# Patient Record
Sex: MALE | Race: WHITE | Employment: PART TIME | URBAN - METROPOLITAN AREA
[De-identification: names, ages, dates, MRNs, and addresses within clinical notes are randomized per-mention and may not be internally consistent; named-entity substitution may affect disease eponyms.]

---

## 2019-07-22 ENCOUNTER — HOSPITAL ENCOUNTER (EMERGENCY)
Age: 19
Discharge: HOME | End: 2019-07-22
Payer: COMMERCIAL

## 2019-07-22 VITALS
BODY MASS INDEX: 21.52 KG/M2 | OXYGEN SATURATION: 98 % | TEMPERATURE: 98 F | SYSTOLIC BLOOD PRESSURE: 124 MMHG | RESPIRATION RATE: 16 BRPM | DIASTOLIC BLOOD PRESSURE: 76 MMHG | WEIGHT: 150 LBS | HEART RATE: 60 BPM

## 2019-07-22 DIAGNOSIS — L23.7 POISON IVY: Primary | ICD-10-CM

## 2019-07-22 PROCEDURE — 99212 OFFICE O/P EST SF 10 MIN: CPT

## 2019-07-22 RX ORDER — PREDNISONE 10 MG/1
TABLET ORAL
Qty: 20 TABLET | Refills: 0 | Status: SHIPPED | OUTPATIENT
Start: 2019-07-22 | End: 2021-10-14

## 2019-07-22 RX ORDER — PREDNISONE 10 MG/1
TABLET ORAL
Qty: 20 TABLET | Refills: 0 | Status: SHIPPED | OUTPATIENT
Start: 2019-07-22 | End: 2019-07-22 | Stop reason: SDUPTHER

## 2019-07-22 NOTE — ED PROVIDER NOTES
DISPOSITION  Disposition: Discharge to home  Patient condition is good         Francesca Ayala PA-C  07/22/19 1111

## 2021-09-05 ENCOUNTER — HOSPITAL ENCOUNTER (EMERGENCY)
Age: 21
Discharge: HOME | End: 2021-09-05
Payer: COMMERCIAL

## 2021-09-05 VITALS
DIASTOLIC BLOOD PRESSURE: 76 MMHG | TEMPERATURE: 98.3 F | HEART RATE: 67 BPM | SYSTOLIC BLOOD PRESSURE: 122 MMHG | HEIGHT: 67 IN | WEIGHT: 160 LBS | RESPIRATION RATE: 20 BRPM | OXYGEN SATURATION: 97 % | BODY MASS INDEX: 25.11 KG/M2

## 2021-09-05 DIAGNOSIS — R19.7 DIARRHEA, UNSPECIFIED TYPE: Primary | ICD-10-CM

## 2021-09-05 LAB
BASOPHILS ABSOLUTE: 0.04 E9/L (ref 0–0.2)
BASOPHILS RELATIVE PERCENT: 0.9 % (ref 0–2)
EOSINOPHILS ABSOLUTE: 0.19 E9/L (ref 0.05–0.5)
EOSINOPHILS RELATIVE PERCENT: 4.2 % (ref 0–6)
GFR AFRICAN AMERICAN: >60
GFR NON-AFRICAN AMERICAN: >60 ML/MIN/1.73
GLUCOSE BLD-MCNC: 90 MG/DL (ref 74–99)
HCT VFR BLD CALC: 46 % (ref 37–54)
HEMOGLOBIN: 16.5 G/DL (ref 12.5–16.5)
IMMATURE GRANULOCYTES #: 0.01 E9/L
IMMATURE GRANULOCYTES %: 0.2 % (ref 0–5)
LYMPHOCYTES ABSOLUTE: 1.42 E9/L (ref 1.5–4)
LYMPHOCYTES RELATIVE PERCENT: 31.1 % (ref 20–42)
MCH RBC QN AUTO: 30.1 PG (ref 26–35)
MCHC RBC AUTO-ENTMCNC: 35.9 % (ref 32–34.5)
MCV RBC AUTO: 83.8 FL (ref 80–99.9)
MONOCYTES ABSOLUTE: 0.46 E9/L (ref 0.1–0.95)
MONOCYTES RELATIVE PERCENT: 10.1 % (ref 2–12)
NEUTROPHILS ABSOLUTE: 2.44 E9/L (ref 1.8–7.3)
NEUTROPHILS RELATIVE PERCENT: 53.5 % (ref 43–80)
PDW BLD-RTO: 12.7 FL (ref 11.5–15)
PERFORMED ON: NORMAL
PLATELET # BLD: 193 E9/L (ref 130–450)
PMV BLD AUTO: 10.9 FL (ref 7–12)
POC CHLORIDE: 104 MMOL/L (ref 100–108)
POC CREATININE: 1.1 MG/DL (ref 0.7–1.2)
POC POTASSIUM: 4.2 MMOL/L (ref 3.5–5)
POC SODIUM: 142 MMOL/L (ref 132–146)
RBC # BLD: 5.49 E12/L (ref 3.8–5.8)
WBC # BLD: 4.6 E9/L (ref 4.5–11.5)

## 2021-09-05 PROCEDURE — 99212 OFFICE O/P EST SF 10 MIN: CPT

## 2021-09-05 PROCEDURE — 82435 ASSAY OF BLOOD CHLORIDE: CPT

## 2021-09-05 PROCEDURE — 82565 ASSAY OF CREATININE: CPT

## 2021-09-05 PROCEDURE — 82947 ASSAY GLUCOSE BLOOD QUANT: CPT

## 2021-09-05 PROCEDURE — 84132 ASSAY OF SERUM POTASSIUM: CPT

## 2021-09-05 PROCEDURE — 85025 COMPLETE CBC W/AUTO DIFF WBC: CPT

## 2021-09-05 PROCEDURE — 36415 COLL VENOUS BLD VENIPUNCTURE: CPT

## 2021-09-05 PROCEDURE — 84295 ASSAY OF SERUM SODIUM: CPT

## 2021-09-05 RX ORDER — DICYCLOMINE HYDROCHLORIDE 10 MG/1
20 CAPSULE ORAL 3 TIMES DAILY PRN
Qty: 15 CAPSULE | Refills: 0 | Status: SHIPPED | OUTPATIENT
Start: 2021-09-05 | End: 2022-01-03 | Stop reason: ALTCHOICE

## 2021-09-05 NOTE — ED PROVIDER NOTES
Department of Emergency Medicine  [address] [place] [address]  Provider Note  Admit Date/Time: 9/5/2021 10:08 AM  Room: 04/04  MRN: [id number]  Chief Complaint: Diarrhea (x 2 weeks) and Rectal Bleeding (rectal bleeding when he wipes denies pain)       History of Present Illness   Source of history provided by:  Patient. History/Exam Limitations: None. Basilia Cruz is a 23 y.o. male with no significant medical history. He reports a 2-week history of diarrhea. Has 2-3 loose stools per day. Intermittently has some very mild abdominal cramping which tends to occur primarily before each bowel movement. Does note some intermittent bright and dark red blood per rectum primarily on the paper when he wipes. No other abdominal pain. No rectal pain. No lightheadedness, near-syncope, or syncope. Is not anticoagulated. No history of bleeding diathesis. No recent antibiotic use. No travel. No sick contacts with infectious diarrhea or similar symptoms. Was concerned due to the longevity of his symptoms. ROS    Pertinent positives and negatives are stated within HPI, all other systems reviewed and are negative. Past Surgical History:   Procedure Laterality Date    TONSILLECTOMY     Social History:  reports that he has never smoked. He has never used smokeless tobacco.  Family History: family history is not on file. Allergies: Patient has no known allergies. Physical Exam   Oxygen Saturation Interpretation: Normal.   ED Triage Vitals [09/05/21 1015]   BP Temp Temp Source Pulse Resp SpO2 Height Weight   122/76 98.3 °F (36.8 °C) Temporal 67 20 97 % 5' 7\" (1.702 m) 160 lb (72.6 kg)       Physical Exam  General: Vitals noted, no distress. Afebrile. EENT: Posterior oropharynx unremarkable. Appears very well-hydrated. Cardiac: Regular, rate, rhythm, no murmur. Pulmonary: Lungs clear bilaterally with good aeration. No adventitious breath sounds. Abdomen: Soft, nonsurgical. Nontender.  No abnormality, renal failure, etc.    MDM:   This is a 23 y.o. male with no significant medical history. Patient presents with the above history. He has had some intermittent bright red blood per rectum although is primarily on the paper when he wipes. No melena. On exam, appears very well-hydrated. Is not tachycardic and is hemodynamically stable. His abdomen is nontender throughout. Did check some basic labs consisting of CBC and CHEM panel which were both unremarkable. Recommended that he follow-up closely with primary care and is given a physician for this. The patient appears to have no risk factors for infectious diarrhea. Will be given Bentyl and Imodium. Also discussed reasons that he should be seen in the emergency department. Counseling: I discussed the differential, results and discharge plan with the patient and/or family/friend/caregiver if present. I emphasized the importance of follow-up with the physician I referred them to in the timeframe recommended. I explained reasons for the patient to return to the Emergency Department. Additional verbal discharge instructions were also given and discussed with the patient to supplement those generated by the EMR. We also discussed medications that were prescribed (if any) including common side effects and interactions. The patient was advised to abstain from driving, operating heavy machinery or making significant decisions while taking medications such as opiates and muscle relaxers that may impair this. All questions were addressed. They understand return precautions and discharge instructions. The patient and/or family/friend/caregiver expressed understanding. Assessment      1.  Diarrhea, unspecified type      Plan   Discharge to home and advised to contact Tianna Gutierres MD  [address] [place] [address] [phone]    In 2 days     Patient condition is good    New Medications     New Prescriptions

## 2021-09-27 PROBLEM — Z98.890 HX OF COLONOSCOPY: Status: ACTIVE | Noted: 2021-09-27

## 2021-10-04 ENCOUNTER — HOSPITAL ENCOUNTER (OUTPATIENT)
Dept: GENERAL RADIOLOGY | Age: 21
Discharge: HOME | End: 2021-10-06
Payer: COMMERCIAL

## 2021-10-04 DIAGNOSIS — K50.90 CROHN'S DISEASE WITHOUT COMPLICATION, UNSPECIFIED GASTROINTESTINAL TRACT LOCATION (HCC): ICD-10-CM

## 2021-10-04 PROCEDURE — 2500000003 HC RX 250 WO HCPCS: Performed by: INTERNAL MEDICINE

## 2021-10-04 PROCEDURE — 74250 X-RAY XM SM INT 1CNTRST STD: CPT

## 2021-10-04 RX ADMIN — BARIUM SULFATE 354 G: 960 POWDER, FOR SUSPENSION ORAL at 08:58

## 2021-10-14 ENCOUNTER — OFFICE VISIT (OUTPATIENT)
Dept: FAMILY MEDICINE CLINIC | Age: 21
End: 2021-10-14
Payer: COMMERCIAL

## 2021-10-14 VITALS
WEIGHT: 150.8 LBS | DIASTOLIC BLOOD PRESSURE: 70 MMHG | HEIGHT: 69 IN | OXYGEN SATURATION: 98 % | BODY MASS INDEX: 22.33 KG/M2 | RESPIRATION RATE: 18 BRPM | HEART RATE: 63 BPM | SYSTOLIC BLOOD PRESSURE: 108 MMHG | TEMPERATURE: 98 F

## 2021-10-14 DIAGNOSIS — L25.9 CONTACT DERMATITIS, UNSPECIFIED CONTACT DERMATITIS TYPE, UNSPECIFIED TRIGGER: ICD-10-CM

## 2021-10-14 DIAGNOSIS — Z76.89 ENCOUNTER TO ESTABLISH CARE: Primary | ICD-10-CM

## 2021-10-14 DIAGNOSIS — K50.111 CROHN'S DISEASE OF COLON WITH RECTAL BLEEDING (HCC): ICD-10-CM

## 2021-10-14 PROCEDURE — 99203 OFFICE O/P NEW LOW 30 MIN: CPT | Performed by: NURSE PRACTITIONER

## 2021-10-14 RX ORDER — MESALAMINE 4 G/60ML
4 ENEMA RECTAL NIGHTLY
COMMUNITY
End: 2022-06-13 | Stop reason: ALTCHOICE

## 2021-10-14 SDOH — ECONOMIC STABILITY: FOOD INSECURITY: WITHIN THE PAST 12 MONTHS, THE FOOD YOU BOUGHT JUST DIDN'T LAST AND YOU DIDN'T HAVE MONEY TO GET MORE.: NEVER TRUE

## 2021-10-14 SDOH — ECONOMIC STABILITY: TRANSPORTATION INSECURITY
IN THE PAST 12 MONTHS, HAS THE LACK OF TRANSPORTATION KEPT YOU FROM MEDICAL APPOINTMENTS OR FROM GETTING MEDICATIONS?: NO

## 2021-10-14 SDOH — ECONOMIC STABILITY: HOUSING INSECURITY
IN THE LAST 12 MONTHS, WAS THERE A TIME WHEN YOU DID NOT HAVE A STEADY PLACE TO SLEEP OR SLEPT IN A SHELTER (INCLUDING NOW)?: NO

## 2021-10-14 SDOH — ECONOMIC STABILITY: TRANSPORTATION INSECURITY
IN THE PAST 12 MONTHS, HAS LACK OF TRANSPORTATION KEPT YOU FROM MEETINGS, WORK, OR FROM GETTING THINGS NEEDED FOR DAILY LIVING?: NO

## 2021-10-14 SDOH — ECONOMIC STABILITY: INCOME INSECURITY: IN THE LAST 12 MONTHS, WAS THERE A TIME WHEN YOU WERE NOT ABLE TO PAY THE MORTGAGE OR RENT ON TIME?: NO

## 2021-10-14 SDOH — ECONOMIC STABILITY: FOOD INSECURITY: WITHIN THE PAST 12 MONTHS, YOU WORRIED THAT YOUR FOOD WOULD RUN OUT BEFORE YOU GOT MONEY TO BUY MORE.: NEVER TRUE

## 2021-10-14 ASSESSMENT — PATIENT HEALTH QUESTIONNAIRE - PHQ9
SUM OF ALL RESPONSES TO PHQ QUESTIONS 1-9: 0
SUM OF ALL RESPONSES TO PHQ9 QUESTIONS 1 & 2: 0
1. LITTLE INTEREST OR PLEASURE IN DOING THINGS: 0
2. FEELING DOWN, DEPRESSED OR HOPELESS: 0

## 2021-10-14 ASSESSMENT — ENCOUNTER SYMPTOMS
NAUSEA: 0
VOICE CHANGE: 0
COUGH: 0
ABDOMINAL PAIN: 0
BACK PAIN: 0
RHINORRHEA: 0
SINUS PRESSURE: 0
COLOR CHANGE: 0
BLOOD IN STOOL: 1
WHEEZING: 0
SHORTNESS OF BREATH: 0
FACIAL SWELLING: 0
SORE THROAT: 0
CHEST TIGHTNESS: 0
SINUS PAIN: 0
VOMITING: 0
TROUBLE SWALLOWING: 0
DIARRHEA: 0
CONSTIPATION: 0

## 2021-10-14 ASSESSMENT — SOCIAL DETERMINANTS OF HEALTH (SDOH): HOW HARD IS IT FOR YOU TO PAY FOR THE VERY BASICS LIKE FOOD, HOUSING, MEDICAL CARE, AND HEATING?: NOT HARD AT ALL

## 2021-10-14 ASSESSMENT — LIFESTYLE VARIABLES: HOW OFTEN DO YOU HAVE A DRINK CONTAINING ALCOHOL: NEVER

## 2021-10-14 NOTE — PATIENT INSTRUCTIONS
The medication list included in this document is our record of what you are currently taking, including any changes that were made at today's visit.  If you find any differences when compared to your medications at home, or have any questions that were not answered at your visit, please contact the office. Patient Education        Dermatitis: Care Instructions  Your Care Instructions  Dermatitis is the general name used for any rash or inflammation of the skin. Different kinds of dermatitis cause different kinds of rashes. Common causes of a rash include new medicines, plants (such as poison oak or poison ivy), heat, and stress. Certain illnesses can also cause a rash. An allergic reaction to something that touches your skin, such as latex, nickel, or poison ivy, is called contact dermatitis. Contact dermatitis may also be caused by something that irritates the skin, such as bleach, a chemical, or soap. These types of rashes cannot be spread from person to person. How long your rash will last depends on what caused it. Rashes may last a few days or months. Follow-up care is a key part of your treatment and safety. Be sure to make and go to all appointments, and call your doctor if you are having problems. It's also a good idea to know your test results and keep a list of the medicines you take. How can you care for yourself at home? · Do not scratch the rash. Cut your nails short, and file them smooth. Or wear gloves if this helps keep you from scratching. · Wash the area with water only. Pat dry. · Put cold, wet cloths on the rash to reduce itching. · Keep cool, and stay out of the sun. · Leave the rash open to the air as much as possible. · If the rash itches, use hydrocortisone cream. Follow the directions on the label. Calamine lotion may help for plant rashes. · Take an over-the-counter antihistamine, such as diphenhydramine (Benadryl) or loratadine (Claritin), to help calm the itching.  Read and follow all instructions on the label. · If your doctor prescribed a cream, use it as directed. If your doctor prescribed medicine, take it exactly as directed. When should you call for help? Call your doctor now or seek immediate medical care if:    · You have symptoms of infection, such as:  ? Increased pain, swelling, warmth, or redness. ? Red streaks leading from the area. ? Pus draining from the area. ? A fever.     · You have joint pain along with the rash. Watch closely for changes in your health, and be sure to contact your doctor if:    · Your rash is changing or getting worse.     · You are not getting better as expected. Where can you learn more? Go to https://[employer]. org and sign in to your [employer] account. Enter [phone] in the [employer] box to learn more about \"Dermatitis: Care Instructions. \"     If you do not have an account, please click on the \"Sign Up Now\" link. Current as of: March 3, 2021               Content Version: 13.0  © 2006-2021 Healthwise, Incorporated. Care instructions adapted under license by [facility] ([facility]). If you have questions about a medical condition or this instruction, always ask your healthcare professional. [place] [address] any warranty or liability for your use of this information.

## 2021-10-14 NOTE — PROGRESS NOTES
OFFICE PROGRESS NOTE  [address]  [address] [place] [id number]  Dept: [phone]   Chief Complaint   Patient presents with   [facility].       ASSESSMENT/PLAN   1. Encounter to establish care  2. Contact dermatitis, unspecified contact dermatitis type, unspecified trigger  Assessment & Plan:  New Unclear control, referral to dermatology  Orders:  -     Amb External Referral To Dermatology  -     CBC Auto Differential; Future  -     Comprehensive Metabolic Panel; Future  3. Crohn's disease of colon with rectal bleeding [facility])  Assessment & Plan:  New Monitored by specialist- no acute findings meriting change in the plan  Orders:  -     CBC Auto Differential; Future  -     Comprehensive Metabolic Panel; Future       Reviewed labs:  [facility] 9/5/21 DR Barroso  Reviewed notes from colonoscopy DR JUAN AND AIRAM [facility] 9/21      Discussed exercising 30 minutes daily and Discussed taking medications as directed and adverse effects    Return in about 1 year (around 10/14/2022) for Annual exam.     HPI:   Here to establish care was seeing Dr Hakan Garza  He is generally healthy, does have Crohn's and follows with DR Barroso. Last colonoscopy reviewed from September 2021. He has seasonal allergies and at one time he saw Dr Kenna Nelson and was getting immunotherapy. He has a rash on his feet and lower legs, arms started in august, he has tried hydrocortisone, aloe, but didn't really help. Occasionally itchy. He hasn't changed any hygiene products. Hx of acne when he was younger. It started on his feet and then to his ankles and lower legs. Now on his arms. He does have a cat.          Current Outpatient Medications:     MESALAMINE PO, Take 800 mg by mouth 2 times daily, Disp: , Rfl:     mesalamine (ROWASA) 4 g enema, Place 4 g rectally nightly, Disp: , Rfl:     dicyclomine (BENTYL) 10 MG capsule, Take 2 capsules by mouth 3 times daily as needed (pain), Disp: 15 capsule, Rfl: none      Surgical History:  has a past surgical history that includes Tonsillectomy and [place] tooth extraction. Social History:  reports that he has never smoked. He has never used smokeless tobacco. He reports that he does not drink alcohol and does not use drugs. Family History: family history includes No Known Problems in his brother, father, and sister; Other in his mother. I have reviewed Eder's allergies, medications, problem list, medical, social and family history and have updated as needed in the electronic medical record    Review of Systems   Constitutional: Negative for activity change, appetite change, chills, diaphoresis, fatigue, fever and unexpected weight change. HENT: Negative for congestion, dental problem, drooling, ear discharge, ear pain, facial swelling, hearing loss, mouth sores, nosebleeds, postnasal drip, rhinorrhea, sinus pressure, sinus pain, sneezing, sore throat, tinnitus, trouble swallowing and voice change. Eyes: Negative for visual disturbance. Respiratory: Negative for cough, chest tightness, shortness of breath and wheezing. Cardiovascular: Negative for chest pain, palpitations and leg swelling. Gastrointestinal: Positive for blood in stool ( has Crohn's follows with GI). Negative for abdominal pain, constipation, diarrhea, nausea and vomiting. Endocrine: Negative for cold intolerance, heat intolerance, polydipsia, polyphagia and polyuria. Genitourinary: Negative for difficulty urinating, frequency and urgency. Musculoskeletal: Negative for arthralgias, back pain, gait problem, joint swelling, myalgias, neck pain and neck stiffness. Skin: Positive for rash. Negative for color change, pallor and wound. Allergic/Immunologic: Negative for environmental allergies, food allergies and immunocompromised state.    Neurological: Negative for dizziness, tremors, seizures, syncope, facial asymmetry, speech difficulty, weakness, light-headedness, numbness and headaches. Hematological: Negative for adenopathy. Does not bruise/bleed easily. Psychiatric/Behavioral: Negative for agitation, behavioral problems, confusion, decreased concentration, dysphoric mood, hallucinations, self-injury, sleep disturbance and suicidal ideas. The patient is not nervous/anxious and is not hyperactive. OBJECTIVE:     VS:  Wt Readings from Last 3 Encounters:   10/14/21 150 lb 12.8 oz (68.4 kg)   09/05/21 160 lb (72.6 kg)   07/22/19 150 lb (68 kg)                       Vitals:    10/14/21 1114   BP: 108/70   Pulse: 63   Resp: 18   Temp: 98 °F (36.7 °C)   SpO2: 98%   Weight: 150 lb 12.8 oz (68.4 kg)   Height: 5' 8.5\" (1.74 m)       General: Alert and oriented to person, place, and time, well developed and well nourished, in no acute distress  SKIN: Warm and dry, intact with fine red macular areas noted to the dorsal foot, legs, arms and back with some pustules on the back. HEAD: normocephalic, atraumatic  Eyes: sclera/conjunctiva clear, PERRLA, EOMI's intact  ENT: tympanic membranes, external ear and ear canal normal bilaterally, normal hearing, Nose without deformity, nasal mucosa and turbinates normal without polyps   Throat: clear, tongue midline,  drainage, no masses or lesions noted, good dentition  Neck: supple and non-tender without mass, trachea midline, no cervical lymphadenopathy, no bruit, no thyromegaly or nodules  Cardiovascular: regular rate and regular rhythm, normal S1 and S2,  no murmurs, rubs, clicks, or gallop. Distal pulses intact, no carotid bruits.  No edema  Pulmonary/Chest: clear to auscultation bilaterally, no wheezes, rales or rhonchi, normal air movement, no respiratory distress  Abdomen: soft, non-tender, non-distended, normal bowel sounds, no masses or hepatosplenomegaly  Musculoskeletal: Normal ROM, no joint swelling, deformity or tenderness   Neurologic: reflexes normal and symmetric, no cranial nerve deficit, gait, coordination and speech normal  Extremities: no clubbing, cyanosis, or edema. Psychiatric: Good eye contact, normal mood and affect, answers questions appropriately    I have reviewed my findings and recommendations with Michael Wisdom.     Kathe Das, ASTER - CNP, NP-C, FNP-BC

## 2021-11-19 ENCOUNTER — HOSPITAL ENCOUNTER (INPATIENT)
Age: 21
LOS: 2 days | Discharge: HOME | DRG: 387 | End: 2021-11-22
Attending: EMERGENCY MEDICINE | Admitting: FAMILY MEDICINE
Payer: COMMERCIAL

## 2021-11-19 DIAGNOSIS — K50.111 CROHN'S DISEASE OF COLON WITH RECTAL BLEEDING (HCC): Primary | ICD-10-CM

## 2021-11-19 LAB
ALBUMIN SERPL-MCNC: 3.9 G/DL (ref 3.5–5.2)
ALP BLD-CCNC: 63 U/L (ref 40–129)
ALT SERPL-CCNC: 7 U/L (ref 0–40)
ANION GAP SERPL CALCULATED.3IONS-SCNC: 16 MMOL/L (ref 7–16)
AST SERPL-CCNC: 17 U/L (ref 0–39)
BASOPHILS ABSOLUTE: 0 E9/L (ref 0–0.2)
BASOPHILS RELATIVE PERCENT: 0.6 % (ref 0–2)
BILIRUB SERPL-MCNC: 0.7 MG/DL (ref 0–1.2)
BUN BLDV-MCNC: 8 MG/DL (ref 6–20)
CALCIUM SERPL-MCNC: 8.9 MG/DL (ref 8.6–10.2)
CHLORIDE BLD-SCNC: 100 MMOL/L (ref 98–107)
CO2: 20 MMOL/L (ref 22–29)
CREAT SERPL-MCNC: 1.3 MG/DL (ref 0.7–1.2)
EOSINOPHILS ABSOLUTE: 0.38 E9/L (ref 0.05–0.5)
EOSINOPHILS RELATIVE PERCENT: 2.6 % (ref 0–6)
GFR AFRICAN AMERICAN: >60
GFR NON-AFRICAN AMERICAN: >60 ML/MIN/1.73
GLUCOSE BLD-MCNC: 107 MG/DL (ref 74–99)
HCT VFR BLD CALC: 38.4 % (ref 37–54)
HEMOGLOBIN: 13.9 G/DL (ref 12.5–16.5)
LACTIC ACID: 2 MMOL/L (ref 0.5–2.2)
LIPASE: 33 U/L (ref 13–60)
LYMPHOCYTES ABSOLUTE: 3.48 E9/L (ref 1.5–4)
LYMPHOCYTES RELATIVE PERCENT: 24.3 % (ref 20–42)
MCH RBC QN AUTO: 29.8 PG (ref 26–35)
MCHC RBC AUTO-ENTMCNC: 36.2 % (ref 32–34.5)
MCV RBC AUTO: 82.4 FL (ref 80–99.9)
MONOCYTES ABSOLUTE: 1.6 E9/L (ref 0.1–0.95)
MONOCYTES RELATIVE PERCENT: 11.3 % (ref 2–12)
NEUTROPHILS ABSOLUTE: 8.99 E9/L (ref 1.8–7.3)
NEUTROPHILS RELATIVE PERCENT: 61.7 % (ref 43–80)
PDW BLD-RTO: 12.6 FL (ref 11.5–15)
PLATELET # BLD: 302 E9/L (ref 130–450)
PMV BLD AUTO: 10.4 FL (ref 7–12)
POIKILOCYTES: ABNORMAL
POLYCHROMASIA: ABNORMAL
POTASSIUM SERPL-SCNC: 3.4 MMOL/L (ref 3.5–5)
RBC # BLD: 4.66 E12/L (ref 3.8–5.8)
SODIUM BLD-SCNC: 136 MMOL/L (ref 132–146)
TOTAL PROTEIN: 6.9 G/DL (ref 6.4–8.3)
WBC # BLD: 14.5 E9/L (ref 4.5–11.5)

## 2021-11-19 PROCEDURE — 96376 TX/PRO/DX INJ SAME DRUG ADON: CPT

## 2021-11-19 PROCEDURE — 85025 COMPLETE CBC W/AUTO DIFF WBC: CPT

## 2021-11-19 PROCEDURE — 96374 THER/PROPH/DIAG INJ IV PUSH: CPT

## 2021-11-19 PROCEDURE — 96375 TX/PRO/DX INJ NEW DRUG ADDON: CPT

## 2021-11-19 PROCEDURE — 6360000002 HC RX W HCPCS: Performed by: NURSE PRACTITIONER

## 2021-11-19 PROCEDURE — 2580000003 HC RX 258: Performed by: NURSE PRACTITIONER

## 2021-11-19 PROCEDURE — 80053 COMPREHEN METABOLIC PANEL: CPT

## 2021-11-19 PROCEDURE — 83690 ASSAY OF LIPASE: CPT

## 2021-11-19 PROCEDURE — 99283 EMERGENCY DEPT VISIT LOW MDM: CPT

## 2021-11-19 PROCEDURE — 83605 ASSAY OF LACTIC ACID: CPT

## 2021-11-19 RX ORDER — 0.9 % SODIUM CHLORIDE 0.9 %
1000 INTRAVENOUS SOLUTION INTRAVENOUS ONCE
Status: COMPLETED | OUTPATIENT
Start: 2021-11-19 | End: 2021-11-20

## 2021-11-19 RX ORDER — MORPHINE SULFATE 2 MG/ML
2 INJECTION, SOLUTION INTRAMUSCULAR; INTRAVENOUS ONCE
Status: DISCONTINUED | OUTPATIENT
Start: 2021-11-19 | End: 2021-11-19

## 2021-11-19 RX ORDER — METHYLPREDNISOLONE SODIUM SUCCINATE 125 MG/2ML
60 INJECTION, POWDER, LYOPHILIZED, FOR SOLUTION INTRAMUSCULAR; INTRAVENOUS ONCE
Status: COMPLETED | OUTPATIENT
Start: 2021-11-19 | End: 2021-11-19

## 2021-11-19 RX ORDER — MORPHINE SULFATE 2 MG/ML
4 INJECTION, SOLUTION INTRAMUSCULAR; INTRAVENOUS ONCE
Status: COMPLETED | OUTPATIENT
Start: 2021-11-19 | End: 2021-11-19

## 2021-11-19 RX ORDER — ONDANSETRON 2 MG/ML
4 INJECTION INTRAMUSCULAR; INTRAVENOUS ONCE
Status: COMPLETED | OUTPATIENT
Start: 2021-11-19 | End: 2021-11-19

## 2021-11-19 RX ORDER — METRONIDAZOLE 500 MG/1
500 TABLET ORAL 3 TIMES DAILY
Status: ON HOLD | COMMUNITY
End: 2021-11-22 | Stop reason: HOSPADM

## 2021-11-19 RX ORDER — MORPHINE SULFATE 2 MG/ML
2 INJECTION, SOLUTION INTRAMUSCULAR; INTRAVENOUS ONCE
Status: COMPLETED | OUTPATIENT
Start: 2021-11-20 | End: 2021-11-20

## 2021-11-19 RX ADMIN — SODIUM CHLORIDE 1000 ML: 9 INJECTION, SOLUTION INTRAVENOUS at 22:56

## 2021-11-19 RX ADMIN — METHYLPREDNISOLONE SODIUM SUCCINATE 60 MG: 125 INJECTION, POWDER, FOR SOLUTION INTRAMUSCULAR; INTRAVENOUS at 23:52

## 2021-11-19 RX ADMIN — ONDANSETRON 4 MG: 2 INJECTION INTRAMUSCULAR; INTRAVENOUS at 22:55

## 2021-11-19 RX ADMIN — MORPHINE SULFATE 2 MG: 2 INJECTION, SOLUTION INTRAMUSCULAR; INTRAVENOUS at 22:56

## 2021-11-19 ASSESSMENT — PAIN DESCRIPTION - LOCATION: LOCATION: ABDOMEN

## 2021-11-19 ASSESSMENT — PAIN DESCRIPTION - ORIENTATION: ORIENTATION: RIGHT

## 2021-11-19 ASSESSMENT — PAIN SCALES - GENERAL
PAINLEVEL_OUTOF10: 6
PAINLEVEL_OUTOF10: 6

## 2021-11-19 ASSESSMENT — PAIN DESCRIPTION - ONSET: ONSET: GRADUAL

## 2021-11-19 ASSESSMENT — PAIN DESCRIPTION - FREQUENCY: FREQUENCY: CONTINUOUS

## 2021-11-19 ASSESSMENT — PAIN DESCRIPTION - DESCRIPTORS: DESCRIPTORS: STABBING;THROBBING

## 2021-11-19 ASSESSMENT — PAIN DESCRIPTION - PAIN TYPE: TYPE: ACUTE PAIN

## 2021-11-19 ASSESSMENT — PAIN DESCRIPTION - PROGRESSION: CLINICAL_PROGRESSION: GRADUALLY WORSENING

## 2021-11-19 NOTE — LETTER
[phone] [id number]  Phone: [phone]    No name on file. November 22, 2021     Patient: Claudia Dyer   YOB: 2000   Date of Visit: 11/19/2021       To Whom It May Concern:     Marylene Enter was admitted at [facility] from 11/19/2021 and discharged 11/22/2021    If you have any questions or concerns, please don't hesitate to call.       Sincerely,

## 2021-11-20 ENCOUNTER — APPOINTMENT (OUTPATIENT)
Dept: CT IMAGING | Age: 21
DRG: 387 | End: 2021-11-20
Payer: COMMERCIAL

## 2021-11-20 PROBLEM — K52.9 COLITIS: Status: ACTIVE | Noted: 2021-11-20

## 2021-11-20 LAB
BACTERIA: ABNORMAL /HPF
BILIRUBIN URINE: NEGATIVE
BLOOD, URINE: NEGATIVE
CLARITY: CLEAR
COLOR: YELLOW
GLUCOSE URINE: NEGATIVE MG/DL
KETONES, URINE: 40 MG/DL
LEUKOCYTE ESTERASE, URINE: NEGATIVE
NITRITE, URINE: POSITIVE
PH UA: 6.5 (ref 5–9)
PROTEIN UA: NEGATIVE MG/DL
RBC UA: ABNORMAL /HPF (ref 0–2)
REASON FOR REJECTION: NORMAL
REJECTED TEST: NORMAL
SPECIFIC GRAVITY UA: <=1.005 (ref 1–1.03)
UROBILINOGEN, URINE: 0.2 E.U./DL
WBC UA: ABNORMAL /HPF (ref 0–5)

## 2021-11-20 PROCEDURE — 87040 BLOOD CULTURE FOR BACTERIA: CPT

## 2021-11-20 PROCEDURE — 81001 URINALYSIS AUTO W/SCOPE: CPT

## 2021-11-20 PROCEDURE — 6360000002 HC RX W HCPCS: Performed by: NURSE PRACTITIONER

## 2021-11-20 PROCEDURE — 6360000002 HC RX W HCPCS: Performed by: FAMILY MEDICINE

## 2021-11-20 PROCEDURE — 1200000000 HC SEMI PRIVATE

## 2021-11-20 PROCEDURE — 2500000003 HC RX 250 WO HCPCS: Performed by: FAMILY MEDICINE

## 2021-11-20 PROCEDURE — 6360000002 HC RX W HCPCS: Performed by: INTERNAL MEDICINE

## 2021-11-20 PROCEDURE — 6360000004 HC RX CONTRAST MEDICATION: Performed by: RADIOLOGY

## 2021-11-20 PROCEDURE — 6370000000 HC RX 637 (ALT 250 FOR IP): Performed by: FAMILY MEDICINE

## 2021-11-20 PROCEDURE — 87077 CULTURE AEROBIC IDENTIFY: CPT

## 2021-11-20 PROCEDURE — 2580000003 HC RX 258: Performed by: FAMILY MEDICINE

## 2021-11-20 PROCEDURE — 74177 CT ABD & PELVIS W/CONTRAST: CPT

## 2021-11-20 PROCEDURE — 87045 FECES CULTURE AEROBIC BACT: CPT

## 2021-11-20 PROCEDURE — 2500000003 HC RX 250 WO HCPCS: Performed by: NURSE PRACTITIONER

## 2021-11-20 PROCEDURE — 2580000003 HC RX 258: Performed by: NURSE PRACTITIONER

## 2021-11-20 RX ORDER — SODIUM CHLORIDE 9 MG/ML
25 INJECTION, SOLUTION INTRAVENOUS PRN
Status: DISCONTINUED | OUTPATIENT
Start: 2021-11-20 | End: 2021-11-22 | Stop reason: HOSPADM

## 2021-11-20 RX ORDER — SODIUM CHLORIDE 0.9 % (FLUSH) 0.9 %
10 SYRINGE (ML) INJECTION PRN
Status: DISCONTINUED | OUTPATIENT
Start: 2021-11-20 | End: 2021-11-22 | Stop reason: HOSPADM

## 2021-11-20 RX ORDER — FENTANYL CITRATE 50 UG/ML
50 INJECTION, SOLUTION INTRAMUSCULAR; INTRAVENOUS
Status: DISCONTINUED | OUTPATIENT
Start: 2021-11-20 | End: 2021-11-22 | Stop reason: HOSPADM

## 2021-11-20 RX ORDER — ONDANSETRON 2 MG/ML
4 INJECTION INTRAMUSCULAR; INTRAVENOUS EVERY 6 HOURS PRN
Status: DISCONTINUED | OUTPATIENT
Start: 2021-11-20 | End: 2021-11-22 | Stop reason: HOSPADM

## 2021-11-20 RX ORDER — SODIUM CHLORIDE 9 MG/ML
INJECTION, SOLUTION INTRAVENOUS CONTINUOUS
Status: DISCONTINUED | OUTPATIENT
Start: 2021-11-20 | End: 2021-11-22 | Stop reason: HOSPADM

## 2021-11-20 RX ORDER — DICYCLOMINE HYDROCHLORIDE 10 MG/1
20 CAPSULE ORAL 3 TIMES DAILY PRN
Status: DISCONTINUED | OUTPATIENT
Start: 2021-11-20 | End: 2021-11-22 | Stop reason: HOSPADM

## 2021-11-20 RX ORDER — ACETAMINOPHEN 650 MG/1
650 SUPPOSITORY RECTAL EVERY 6 HOURS PRN
Status: DISCONTINUED | OUTPATIENT
Start: 2021-11-20 | End: 2021-11-22 | Stop reason: HOSPADM

## 2021-11-20 RX ORDER — METHYLPREDNISOLONE SODIUM SUCCINATE 40 MG/ML
40 INJECTION, POWDER, LYOPHILIZED, FOR SOLUTION INTRAMUSCULAR; INTRAVENOUS EVERY 6 HOURS
Status: DISCONTINUED | OUTPATIENT
Start: 2021-11-20 | End: 2021-11-22 | Stop reason: HOSPADM

## 2021-11-20 RX ORDER — MESALAMINE 4 G/60ML
4 ENEMA RECTAL NIGHTLY
Status: DISCONTINUED | OUTPATIENT
Start: 2021-11-20 | End: 2021-11-21

## 2021-11-20 RX ORDER — SODIUM CHLORIDE 9 MG/ML
INJECTION, SOLUTION INTRAVENOUS CONTINUOUS
Status: DISCONTINUED | OUTPATIENT
Start: 2021-11-20 | End: 2021-11-20 | Stop reason: SDUPTHER

## 2021-11-20 RX ORDER — SODIUM CHLORIDE 0.9 % (FLUSH) 0.9 %
10 SYRINGE (ML) INJECTION EVERY 12 HOURS SCHEDULED
Status: DISCONTINUED | OUTPATIENT
Start: 2021-11-20 | End: 2021-11-22 | Stop reason: HOSPADM

## 2021-11-20 RX ORDER — PROMETHAZINE HYDROCHLORIDE 25 MG/1
12.5 TABLET ORAL EVERY 6 HOURS PRN
Status: DISCONTINUED | OUTPATIENT
Start: 2021-11-20 | End: 2021-11-22 | Stop reason: HOSPADM

## 2021-11-20 RX ORDER — POLYETHYLENE GLYCOL 3350 17 G/17G
17 POWDER, FOR SOLUTION ORAL DAILY PRN
Status: DISCONTINUED | OUTPATIENT
Start: 2021-11-20 | End: 2021-11-20

## 2021-11-20 RX ORDER — ACETAMINOPHEN 325 MG/1
650 TABLET ORAL EVERY 6 HOURS PRN
Status: DISCONTINUED | OUTPATIENT
Start: 2021-11-20 | End: 2021-11-22 | Stop reason: HOSPADM

## 2021-11-20 RX ADMIN — METHYLPREDNISOLONE SODIUM SUCCINATE 40 MG: 40 INJECTION, POWDER, FOR SOLUTION INTRAMUSCULAR; INTRAVENOUS at 09:48

## 2021-11-20 RX ADMIN — SODIUM CHLORIDE: 9 INJECTION, SOLUTION INTRAVENOUS at 09:29

## 2021-11-20 RX ADMIN — CEFTRIAXONE 1000 MG: 1 INJECTION, POWDER, FOR SOLUTION INTRAMUSCULAR; INTRAVENOUS at 02:58

## 2021-11-20 RX ADMIN — CEFTRIAXONE 1000 MG: 1 INJECTION, POWDER, FOR SOLUTION INTRAMUSCULAR; INTRAVENOUS at 23:53

## 2021-11-20 RX ADMIN — MORPHINE SULFATE 2 MG: 2 INJECTION, SOLUTION INTRAMUSCULAR; INTRAVENOUS at 00:14

## 2021-11-20 RX ADMIN — SODIUM CHLORIDE, PRESERVATIVE FREE 10 ML: 5 INJECTION INTRAVENOUS at 09:49

## 2021-11-20 RX ADMIN — METHYLPREDNISOLONE SODIUM SUCCINATE 40 MG: 40 INJECTION, POWDER, FOR SOLUTION INTRAMUSCULAR; INTRAVENOUS at 23:54

## 2021-11-20 RX ADMIN — METRONIDAZOLE 500 MG: 500 INJECTION, SOLUTION INTRAVENOUS at 13:05

## 2021-11-20 RX ADMIN — IOPAMIDOL 90 ML: 755 INJECTION, SOLUTION INTRAVENOUS at 01:06

## 2021-11-20 RX ADMIN — MESALAMINE 4 G: 4 ENEMA RECTAL at 23:54

## 2021-11-20 RX ADMIN — METRONIDAZOLE 500 MG: 500 INJECTION, SOLUTION INTRAVENOUS at 02:59

## 2021-11-20 RX ADMIN — METHYLPREDNISOLONE SODIUM SUCCINATE 40 MG: 40 INJECTION, POWDER, FOR SOLUTION INTRAMUSCULAR; INTRAVENOUS at 16:28

## 2021-11-20 RX ADMIN — SODIUM CHLORIDE: 9 INJECTION, SOLUTION INTRAVENOUS at 06:14

## 2021-11-20 RX ADMIN — METRONIDAZOLE 500 MG: 500 INJECTION, SOLUTION INTRAVENOUS at 18:32

## 2021-11-20 ASSESSMENT — PAIN SCALES - GENERAL
PAINLEVEL_OUTOF10: 0
PAINLEVEL_OUTOF10: 3

## 2021-11-20 ASSESSMENT — PAIN DESCRIPTION - PAIN TYPE: TYPE: ACUTE PAIN

## 2021-11-20 NOTE — PROGRESS NOTES
Notified Dr. Gabrielle Resendez that our GI doctor is only doing speciality cases like ERCP, etc. He said the patient does not need a scope or anything and that they will treat him medically at this time. RN asked if we could D/C the consult and he said to just leave it.

## 2021-11-20 NOTE — ED PROVIDER NOTES
ED Physician   HPI:  11/19/21, Time: 10:28 PM TORO Pulido is a 23 y.o. male presenting to the ED for worsening severe abdominal pain. Patient with recent diagnosis of Crohn's disease, mom is at bedside and reports patient has been worsening over the last month, she states that he has had multiple episodes of nausea as well as bloody stools. He is being followed by Dr. Yusef Obrien, and reports that he just saw the physicians involved the group on November 17 and had stool samples and was actually started on Flagyl. Mom reports that he has been in severe pain over the last 2 to 3 days. He has had nausea,and  multiple episodes of bloody stools describing it as dark burgundy in color. Patient otherwise has not had any chest pain or shortness of breath as well as no noted back or flank pain. They are unaware of any fevers. Patient has had all 3 COVID-19 vaccines. Patient on exam is moderately uncomfortable, pale in color. Patient reports pain is primarily to his right upper quadrant area. Patient reports also that he last had a colonoscopy in September 2021. Symptoms moderate in severity and persistent    Review of Systems:   A complete review of systems was performed and pertinent positives and negatives are stated within HPI, all other systems reviewed and are negative.          --------------------------------------------- PAST HISTORY ---------------------------------------------  Past Medical History:  has a past medical history of Allergic, Allergic rhinitis, and Hx of colonoscopy. Past Surgical History:  has a past surgical history that includes Tonsillectomy and [place] tooth extraction. Social History:  reports that he has never smoked. He has never used smokeless tobacco. He reports that he does not drink alcohol and does not use drugs. Family History: family history includes No Known Problems in his brother, father, and sister; Other in his mother.      The patients home medications have been reviewed.     Allergies: Cattle epithelium extract allergy skin test, Dog epithelium allergy skin test, Mixed ragweed, and Seasonal    -------------------------------------------------- RESULTS -------------------------------------------------  All laboratory and radiology results have been personally reviewed by myself   LABS:  Results for orders placed or performed during the hospital encounter of 11/19/21   CBC Auto Differential   Result Value Ref Range    WBC 14.5 (H) 4.5 - 11.5 E9/L    RBC 4.66 3.80 - 5.80 E12/L    Hemoglobin 13.9 12.5 - 16.5 g/dL    Hematocrit 38.4 37.0 - 54.0 %    MCV 82.4 80.0 - 99.9 fL    MCH 29.8 26.0 - 35.0 pg    MCHC 36.2 (H) 32.0 - 34.5 %    RDW 12.6 11.5 - 15.0 fL    Platelets 961 447 - 948 E9/L    MPV 10.4 7.0 - 12.0 fL    Neutrophils % 61.7 43.0 - 80.0 %    Lymphocytes % 24.3 20.0 - 42.0 %    Monocytes % 11.3 2.0 - 12.0 %    Eosinophils % 2.6 0.0 - 6.0 %    Basophils % 0.6 0.0 - 2.0 %    Neutrophils Absolute 8.99 (H) 1.80 - 7.30 E9/L    Lymphocytes Absolute 3.48 1.50 - 4.00 E9/L    Monocytes Absolute 1.60 (H) 0.10 - 0.95 E9/L    Eosinophils Absolute 0.38 0.05 - 0.50 E9/L    Basophils Absolute 0.00 0.00 - 0.20 E9/L    Polychromasia 1+     Poikilocytes 2+    Comprehensive Metabolic Panel   Result Value Ref Range    Sodium 136 132 - 146 mmol/L    Potassium 3.4 (L) 3.5 - 5.0 mmol/L    Chloride 100 98 - 107 mmol/L    CO2 20 (L) 22 - 29 mmol/L    Anion Gap 16 7 - 16 mmol/L    Glucose 107 (H) 74 - 99 mg/dL    BUN 8 6 - 20 mg/dL    CREATININE 1.3 (H) 0.7 - 1.2 mg/dL    GFR Non-African American >60 >=60 mL/min/1.73    GFR African American >60     Calcium 8.9 8.6 - 10.2 mg/dL    Total Protein 6.9 6.4 - 8.3 g/dL    Albumin 3.9 3.5 - 5.2 g/dL    Total Bilirubin 0.7 0.0 - 1.2 mg/dL    Alkaline Phosphatase 63 40 - 129 U/L    ALT 7 0 - 40 U/L    AST 17 0 - 39 U/L   Lactic Acid, Plasma   Result Value Ref Range    Lactic Acid 2.0 0.5 - 2.2 mmol/L   Lipase   Result Value Ref Range    Lipase 33 13 - 60 U/L       RADIOLOGY:  Interpreted by Radiologist.  CT ABDOMEN PELVIS W IV CONTRAST Additional Contrast? None   Final Result   Diffuse thickening of the descending and sigmoid colon with lack of haustra   markings. Correlate with colitis clinically. Normal small bowel. No   definitive evidence for bowel obstruction.             ------------------------- NURSING NOTES AND VITALS REVIEWED ---------------------------   The nursing notes within the ED encounter and vital signs as below have been reviewed. BP (!) 146/83   Pulse 98   Temp 97 °F (36.1 °C)   Resp 18   Ht 5' 9\" (1.753 m)   Wt 148 lb (67.1 kg)   SpO2 97%   BMI 21.86 kg/m²   Oxygen Saturation Interpretation: Normal      ---------------------------------------------------PHYSICAL EXAM--------------------------------------      Constitutional/General: Alert and oriented x3, moderately uncomfortable  Head: Normocephalic and atraumatic  Eyes: PERRL, EOMI  Mouth: Oropharynx clear, handling secretions, no trismus  Neck: Supple, full ROM,   Pulmonary: Lungs clear to auscultation bilaterally, no wheezes, rales, or rhonchi. Not in respiratory distress  Cardiovascular:  Regular rate and rhythm, no murmurs, gallops, or rubs. 2+ distal pulses  Abdomen: Soft,  tender, non distended, point tenderness to right upper quadrant. No unusual bulges or areas of pulsation. Extremities: Moves all extremities x 4.  Warm and well perfused  Skin: warm and dry without rash, pale in color  Neurologic: GCS 15,  Psych: Normal Affect      ------------------------------ ED COURSE/MEDICAL DECISION MAKING----------------------  Medications   cefTRIAXone (ROCEPHIN) 1,000 mg in sterile water 10 mL IV syringe (has no administration in time range)   metronidazole (FLAGYL) 500 mg in NaCl 100 mL IVPB premix (has no administration in time range)   0.9 % sodium chloride infusion (has no administration in time range)   0.9 % sodium chloride bolus (0 mLs IntraVENous Stopped 11/20/21 0203)   ondansetron (ZOFRAN) injection 4 mg (4 mg IntraVENous Given 11/19/21 2255)   morphine (PF) injection 4 mg (2 mg IntraVENous Given 11/19/21 2256)   methylPREDNISolone sodium (SOLU-MEDROL) injection 60 mg (60 mg IntraVENous Given 11/19/21 2352)   morphine (PF) injection 2 mg (2 mg IntraVENous Given 11/20/21 0014)   iopamidol (ISOVUE-370) 76 % injection 90 mL (90 mLs IntraVENous Given 11/20/21 0106)         ED COURSE:       Medical Decision Making: Plan will be for labs will also obtain imaging will medicate for symptom relief. Labs resulted CBC with elevated white blood cell count of 14.5. Hemoglobin hematocrit stable at 13 and 38. Chemistry panel with potassium level slightly low at 3.4 with slight bump in creatinine of 1.3. Lactic acid level negative lipase negative. Patient did receive IV fluids as well as IV morphine and Zofran. No further active nausea or vomiting. Pain mildly controlled. CT scan of the abdomen pelvis resulted showing diffuse thickening of the descending and sigmoid colon with lack of markings. Correlate with colitis clinically. Normal small bowel. No definite evidence of a bowel obstruction. Patient has not been able to eat or drink in the last week with symptoms worsening over the last several days. Overall patient does not look well, despite giving him IV fluids and IV meds for pain. [place] He would benefit from IV hydration and IV antibiotics and further evaluation. He does appear dehydrated, still nauseous and still does have some mild lower abdominal pain. Patient is afebrile here. He is not tachycardic and he is not hypoxic. Plan will be for admission, will initiate IV antibiotics, IV Rocephin and IV Flagyl will also start patient on maintenance fluids, normal saline at 100 mils per hour. Patient expressed understanding as well as his mother expressed understanding for admission. He would benefit from hydration.   Patient's mother as well as patient himself expressed understanding of admission with full understanding. Patient was accepted by sound physician. Patient will be admitted to medical surgical inpatient. Counseling: The emergency provider has spoken with the patient and discussed todays results, in addition to providing specific details for the plan of care and counseling regarding the diagnosis and prognosis. Questions are answered at this time and they are agreeable with the plan.      --------------------------------- IMPRESSION AND DISPOSITION ---------------------------------    IMPRESSION  1. Crohn's disease of colon with [facility]        DISPOSITION  Disposition: Admit to med/surg floor  Patient condition is good      NOTE: This report was transcribed using voice recognition software.  Every effort was made to ensure accuracy; however, inadvertent computerized transcription errors may be present     ASTER Hyatt CNP  11/20/21 0256

## 2021-11-20 NOTE — H&P
Hospitalist History & Physical      PCP: Shemar Darnell, APRN - CNP    Date of Service: Pt seen/examined on 11/20/2021     Chief Complaint:  had concerns including Abdominal Pain (ABD pian recent dx of chrons blood with diarrhea. was put on an ATB on wed. ). History Of Present Illness:    Mr. Brenda Arteaga, a 23y.o. year old male  who  has a past medical history of Allergic, Allergic rhinitis, and Hx of colonoscopy. Patient presented to the emergency department with complaints of abdominal pain. Diagnosed recently with Crohn's disease. He has had multiple episodes of nausea and bloody stools. Has been in severe pain over the last 2 to 3 days. Denies fever, chills, chest pain, shortness of breath. Vital signs within normal limits and stable. Laboratory studies notable for creatinine 1.3, potassium 3.4, glucose 107, WBC 14.5. CT abdomen pelvis shows diffuse thickening of descending and sigmoid colon with lack of haustral markings. Correlate with colitis clinically. Patient started on empiric antibiotics and medicine consulted for admission. Past Medical History:   Diagnosis Date    Allergic     Allergic rhinitis     Hx of colonoscopy 9/27/2021    9/15/2021 colonoscopy Dr Last Carrasco probable Crohn's disease involving a 2 cm circumferential segment in the proximal transverse colon, mild in intension. Anorectal region from the anus at 20 cm with moderate granularity. Asacol HD 2 tablet TID, hydrocortisone suppositories in the evening for 2 weeks, small bowel follow through FU in 3 weeks       Past Surgical History:   Procedure Laterality Date    TONSILLECTOMY      WISDOM TOOTH EXTRACTION         Prior to Admission medications    Medication Sig Start Date End Date Taking?  Authorizing Provider   metroNIDAZOLE (FLAGYL) 500 MG tablet Take 500 mg by mouth 3 times daily   Yes Historical Provider, MD   mesalamine (ROWASA) 4 g enema Place 4 g rectally nightly   Yes Historical Provider, MD   dicyclomine (BENTYL) 10 MG capsule Take 2 capsules by mouth 3 times daily as needed (pain) 9/5/21 9/5/22 Yes LYLE Salinas         Allergies:  Cattle epithelium extract allergy skin test, Dog epithelium allergy skin test, Mixed ragweed, and Seasonal    Social History:    TOBACCO:   reports that he has never smoked. He has never used smokeless tobacco.  ETOH:   reports no history of alcohol use. Family History:    Reviewed in detail and negative for DM, CAD, Cancer, CVA. Positive as follows\"      Problem Relation Age of Onset    Other Mother         blood clots    No Known Problems Father     No Known Problems Sister     No Known Problems Brother        REVIEW OF SYSTEMS:   Pertinent positives as noted in the HPI. All other systems reviewed and negative. PHYSICAL EXAM:  BP (!) 146/83   Pulse 98   Temp 97 °F (36.1 °C)   Resp 18   Ht 5' 9\" (1.753 m)   Wt 148 lb (67.1 kg)   SpO2 97%   BMI 21.86 kg/m²   General appearance: No apparent distress, appears stated age and cooperative. HEENT: Normal cephalic, atraumatic without obvious deformity. Pupils equal, round, and reactive to light. Extra ocular muscles intact. Conjunctivae/corneas clear. Neck: Supple, with full range of motion. No jugular venous distention. Trachea midline. Respiratory: Clear to auscultation bilaterally  Cardiovascular: Regular rate and rhythm  Abdomen: Soft, tender to palpation right upper quadrant, nondistended  Musculoskeletal: No clubbing, cyanosis, edema of bilateral lower extremities. Brisk capillary refill. Skin: Normal skin color. No rashes or lesions. Neurologic:  Neurovascularly intact without any focal sensory/motor deficits.  Cranial nerves: II-XII intact, grossly non-focal.        CBC:   Recent Labs     11/19/21  2240   WBC 14.5*   RBC 4.66   HGB 13.9   HCT 38.4   MCV 82.4   RDW 12.6        BMP:   Recent Labs     11/19/21  2240      K 3.4*      CO2 20*   BUN 8   CREATININE 1.3*     LFT:  Recent Labs 11/19/21  2240   PROT 6.9   ALKPHOS 63   ALT 7   AST 17   BILITOT 0.7   LIPASE 33     CE:  No results for input(s): CKTOTAL, TROPONINI in the last 72 hours. PT/INR: No results for input(s): INR, APTT in the last 72 hours. BNP: No results for input(s): BNP in the last 72 hours. ESR: No results found for: SEDRATE  CRP: No results found for: CRP  D Dimer: No results found for: DDIMER   Folate and B12: No results found for: FWHCWSYV40, No results found for: FOLATE  Lactic Acid:   Lab Results   Component Value Date    LACTA 2.0 11/19/2021     Thyroid Studies: No results found for: TSH, I3RZDUA, A8YBTJQ, THYROIDAB    Oupatient labs:  No results found for: CHOL, TRIG, HDL, LDLCALC, TSH, PSA, INR, LABA1C    Urinalysis:  No results found for: NITRU, WBCUA, BACTERIA, RBCUA, BLOODU, SPECGRAV, GLUCOSEU    Imaging:  CT ABDOMEN PELVIS W IV CONTRAST Additional Contrast? None    Result Date: 11/20/2021  EXAMINATION: CT OF THE ABDOMEN AND PELVIS WITH CONTRAST 11/20/2021 12:42 am TECHNIQUE: CT of the abdomen and pelvis was performed with the administration of intravenous contrast. Multiplanar reformatted images are provided for review. Dose modulation, iterative reconstruction, and/or weight based adjustment of the mA/kV was utilized to reduce the radiation dose to as low as reasonably achievable. COMPARISON: None. HISTORY: ORDERING SYSTEM PROVIDED HISTORY: Crohns disease,  severe abd pain and bloody stools TECHNOLOGIST PROVIDED HISTORY: Reason for exam:->Crohns disease,  severe abd pain and bloody stools Additional Contrast?->None Decision Support Exception - unselect if not a suspected or confirmed emergency medical condition->Emergency Medical Condition (MA) What reading provider will be dictating this exam?->CRC FINDINGS: Lower Chest: The visualized lungs, heart and pericardium are normal. Organs: The liver, spleen, adrenal glands, kidneys, pancreas are normal.  The gallbladder is mildly distended.  GI/bowel: Diffuse thickening of the descending and sigmoid colon with lack of haustra markings. Colon is air distended. Normal appendix and small bowel. Pelvis: Normal urinary bladder. Small amount of free fluid in the right-side of the pelvis. No gross free air. Free air or free fluid. Bones/Soft Tissues: Unremarkable. Diffuse thickening of the descending and sigmoid colon with lack of haustra markings. Correlate with colitis clinically. Normal small bowel. No definitive evidence for bowel obstruction. ASSESSMENT:  -Colitis  -Crohn's disease  -Abdominal pain  -Nausea  -Leukocytosis      PLAN:  -Admit to medicine  -Consult gastroenterology  -Ceftriaxone 1 g daily  -Metronidazole 500 mg every 8 hours  -Pain management  -Follow cultures  -Antiemetics  -Continue home medications        Diet: No diet orders on file  Code Status: No Order  Surrogate decision maker confirmed with patient:   Extended Emergency Contact Information  Primary Emergency Contact: Ross Guerrero  Address: [address] [place] [address] [address] Phone: [phone]  Mobile Phone: [phone]  Relation: Parent  Secondary Emergency Contact: Bharati Lima  Address: [address] [place] [address] [address] Phone: [phone]  Mobile Phone: [phone]  Relation: Parent    DVT Prophylaxis: []Lovenox []Heparin []PCD [] [address] []Encouraged ambulation  Disposition: []Med/Surg [] Intermediate [] ICU/CCU  Admit status: [] Observation [] Inpatient     +++++++++++++++++++++++++++++++++++++++++++++++++  Mo Em DO  [facility] [address]  +++++++++++++++++++++++++++++++++++++++++++++++++  NOTE: This report was transcribed using voice recognition software. Every effort was made to ensure accuracy; however, inadvertent computerized transcription errors may be present.

## 2021-11-21 LAB
ALBUMIN SERPL-MCNC: 3 G/DL (ref 3.5–5.2)
ALP BLD-CCNC: 51 U/L (ref 40–129)
ALT SERPL-CCNC: 15 U/L (ref 0–40)
ANION GAP SERPL CALCULATED.3IONS-SCNC: 12 MMOL/L (ref 7–16)
ANISOCYTOSIS: ABNORMAL
AST SERPL-CCNC: 16 U/L (ref 0–39)
BASOPHILS ABSOLUTE: 0 E9/L (ref 0–0.2)
BASOPHILS RELATIVE PERCENT: 0.2 % (ref 0–2)
BILIRUB SERPL-MCNC: <0.2 MG/DL (ref 0–1.2)
BUN BLDV-MCNC: 10 MG/DL (ref 6–20)
BURR CELLS: ABNORMAL
CALCIUM SERPL-MCNC: 8.4 MG/DL (ref 8.6–10.2)
CHLORIDE BLD-SCNC: 107 MMOL/L (ref 98–107)
CO2: 22 MMOL/L (ref 22–29)
CREAT SERPL-MCNC: 1 MG/DL (ref 0.7–1.2)
EOSINOPHILS ABSOLUTE: 0 E9/L (ref 0.05–0.5)
EOSINOPHILS RELATIVE PERCENT: 0 % (ref 0–6)
GFR AFRICAN AMERICAN: >60
GFR NON-AFRICAN AMERICAN: >60 ML/MIN/1.73
GLUCOSE BLD-MCNC: 148 MG/DL (ref 74–99)
HCT VFR BLD CALC: 31.7 % (ref 37–54)
HEMOGLOBIN: 11 G/DL (ref 12.5–16.5)
LACTIC ACID: 0.8 MMOL/L (ref 0.5–2.2)
LYMPHOCYTES ABSOLUTE: 0.52 E9/L (ref 1.5–4)
LYMPHOCYTES RELATIVE PERCENT: 4.3 % (ref 20–42)
MCH RBC QN AUTO: 29.2 PG (ref 26–35)
MCHC RBC AUTO-ENTMCNC: 34.7 % (ref 32–34.5)
MCV RBC AUTO: 84.1 FL (ref 80–99.9)
MONOCYTES ABSOLUTE: 0.39 E9/L (ref 0.1–0.95)
MONOCYTES RELATIVE PERCENT: 2.6 % (ref 2–12)
NEUTROPHILS ABSOLUTE: 12.18 E9/L (ref 1.8–7.3)
NEUTROPHILS RELATIVE PERCENT: 93 % (ref 43–80)
OVALOCYTES: ABNORMAL
PDW BLD-RTO: 13.1 FL (ref 11.5–15)
PLATELET # BLD: 245 E9/L (ref 130–450)
PMV BLD AUTO: 10.7 FL (ref 7–12)
POIKILOCYTES: ABNORMAL
POLYCHROMASIA: ABNORMAL
POTASSIUM REFLEX MAGNESIUM: 3.9 MMOL/L (ref 3.5–5)
RBC # BLD: 3.77 E12/L (ref 3.8–5.8)
SODIUM BLD-SCNC: 141 MMOL/L (ref 132–146)
TOTAL PROTEIN: 5.5 G/DL (ref 6.4–8.3)
WBC # BLD: 13.1 E9/L (ref 4.5–11.5)

## 2021-11-21 PROCEDURE — 36415 COLL VENOUS BLD VENIPUNCTURE: CPT

## 2021-11-21 PROCEDURE — 2500000003 HC RX 250 WO HCPCS: Performed by: FAMILY MEDICINE

## 2021-11-21 PROCEDURE — 1200000000 HC SEMI PRIVATE

## 2021-11-21 PROCEDURE — 6360000002 HC RX W HCPCS: Performed by: INTERNAL MEDICINE

## 2021-11-21 PROCEDURE — 6360000002 HC RX W HCPCS: Performed by: FAMILY MEDICINE

## 2021-11-21 PROCEDURE — 2580000003 HC RX 258: Performed by: FAMILY MEDICINE

## 2021-11-21 PROCEDURE — 83605 ASSAY OF LACTIC ACID: CPT

## 2021-11-21 PROCEDURE — 80053 COMPREHEN METABOLIC PANEL: CPT

## 2021-11-21 PROCEDURE — 85025 COMPLETE CBC W/AUTO DIFF WBC: CPT

## 2021-11-21 RX ADMIN — SODIUM CHLORIDE: 9 INJECTION, SOLUTION INTRAVENOUS at 14:59

## 2021-11-21 RX ADMIN — METHYLPREDNISOLONE SODIUM SUCCINATE 40 MG: 40 INJECTION, POWDER, FOR SOLUTION INTRAMUSCULAR; INTRAVENOUS at 03:17

## 2021-11-21 RX ADMIN — METRONIDAZOLE 500 MG: 500 INJECTION, SOLUTION INTRAVENOUS at 18:59

## 2021-11-21 RX ADMIN — METRONIDAZOLE 500 MG: 500 INJECTION, SOLUTION INTRAVENOUS at 11:13

## 2021-11-21 RX ADMIN — METRONIDAZOLE 500 MG: 500 INJECTION, SOLUTION INTRAVENOUS at 03:16

## 2021-11-21 RX ADMIN — CEFTRIAXONE 1000 MG: 1 INJECTION, POWDER, FOR SOLUTION INTRAMUSCULAR; INTRAVENOUS at 21:43

## 2021-11-21 RX ADMIN — METHYLPREDNISOLONE SODIUM SUCCINATE 40 MG: 40 INJECTION, POWDER, FOR SOLUTION INTRAMUSCULAR; INTRAVENOUS at 08:46

## 2021-11-21 RX ADMIN — SODIUM CHLORIDE, PRESERVATIVE FREE 10 ML: 5 INJECTION INTRAVENOUS at 21:44

## 2021-11-21 RX ADMIN — METHYLPREDNISOLONE SODIUM SUCCINATE 40 MG: 40 INJECTION, POWDER, FOR SOLUTION INTRAMUSCULAR; INTRAVENOUS at 21:42

## 2021-11-21 RX ADMIN — METHYLPREDNISOLONE SODIUM SUCCINATE 40 MG: 40 INJECTION, POWDER, FOR SOLUTION INTRAMUSCULAR; INTRAVENOUS at 14:59

## 2021-11-21 ASSESSMENT — PAIN SCALES - GENERAL
PAINLEVEL_OUTOF10: 0
PAINLEVEL_OUTOF10: 0

## 2021-11-21 NOTE — PROGRESS NOTES
Hospitalist Progress Note      SYNOPSIS: Patient admitted on 2021 for <principal problem not specified>  Patient presented to the emergency department with complaints of abdominal pain. Diagnosed recently with Crohn's disease. He has had multiple episodes of nausea and bloody stools. Has been in severe pain over the last 2 to 3 days. Denies fever, chills, chest pain, shortness of breath. Vital signs within normal limits and stable. Laboratory studies notable for creatinine 1.3, potassium 3.4, glucose 107, WBC 14.5. CT abdomen pelvis shows diffuse thickening of descending and sigmoid colon with lack of haustral markings. Correlate with colitis clinically. Patient started on empiric antibiotics and medicine consulted for admission.       SUBJECTIVE:    Patient seen and examined. He stated that his diarrhea is improving. He is complaining that after his enema last night he started having bloody bowel movement. Discussed with mother at the bedside. I spoke to Dr. Frankie Slaughter from GI yesterday he will come and see the patient tomorrow Monday. Records reviewed. Stable overnight. No other overnight issues reported. Temp (24hrs), Av.4 °F (36.9 °C), Min:97.9 °F (36.6 °C), Max:98.6 °F (37 °C)    DIET: ADULT DIET; Regular  CODE: Full Code    Intake/Output Summary (Last 24 hours) at 2021 0938  Last data filed at 2021 1051  Gross per 24 hour   Intake 310 ml   Output 300 ml   Net 10 ml       OBJECTIVE:    /68   Pulse 68   Temp 98.6 °F (37 °C)   Resp 17   Ht 5' 9\" (1.753 m)   Wt 148 lb (67.1 kg)   SpO2 99%   BMI 21.86 kg/m²     General appearance: No apparent distress, appears stated age and cooperative. HEENT:  Conjunctivae/corneas clear. Neck: Supple. No jugular venous distention.    Respiratory: Clear to auscultation bilaterally, normal respiratory effort  Cardiovascular: Regular rate rhythm, normal S1-S2  Abdomen: Soft, nontender, nondistended  Musculoskeletal: No clubbing, cyanosis, no bilateral lower extremity edema. Brisk capillary refill. Skin:  No rashes  on visible skin  Neurologic: awake, alert and following commands     Assessment and plan:  #Acute colitis with Crohn's exacerbation  -Abdominal pain and diarrhea is improving  -Patient was diagnosed few months ago  -Symptoms improving with IV steroids  -Resume IV antibiotic Rocephin and Flagyl  -Pending C. Difficile  -Follow stool culture results  -Discontinue mesalamine enema since patient reporting bleeding after each use  -Patient was on mesalamine pills and was switched to enema per GI  -Discussed with Dr. Nelsy Saini from GI he will see the patient tomorrow 11/22      DISPOSITION: Home    Medications:  REVIEWED DAILY    Infusion Medications    sodium chloride      sodium chloride 100 mL/hr at 11/20/21 0929     Scheduled Medications    sodium chloride flush  10 mL IntraVENous 2 times per day    enoxaparin  40 mg SubCUTAneous Daily    cefTRIAXone (ROCEPHIN) IV  1,000 mg IntraVENous Q24H    metroNIDAZOLE  500 mg IntraVENous Q8H    methylPREDNISolone  40 mg IntraVENous Q6H     PRN Meds: dicyclomine, sodium chloride flush, sodium chloride, promethazine **OR** ondansetron, acetaminophen **OR** acetaminophen, fentanNYL    Labs:     Recent Labs     11/19/21 2240 11/21/21  0449   WBC 14.5* 13.1*   HGB 13.9 11.0*   HCT 38.4 31.7*    245       Recent Labs     11/19/21 2240 11/21/21  0449    141   K 3.4* 3.9    107   CO2 20* 22   BUN 8 10   CREATININE 1.3* 1.0   CALCIUM 8.9 8.4*       Recent Labs     11/19/21 2240 11/21/21  0449   PROT 6.9 5.5*   ALKPHOS 63 51   ALT 7 15   AST 17 16   BILITOT 0.7 <0.2   LIPASE 33  --        No results for input(s): INR in the last 72 hours. No results for input(s): Adonica Hoose in the last 72 hours.     Chronic labs:    No results found for: CHOL, TRIG, HDL, LDLCALC, TSH, PSA, INR, LABA1C    Radiology: REVIEWED DAILY    +++++++++++++++++++++++++++++++++++++++++++++++++  MD OLVIN Shah/ [place] [address]  +++++++++++++++++++++++++++++++++++++++++++++++++  NOTE: This report was transcribed using voice recognition software. Every effort was made to ensure accuracy; however, inadvertent computerized transcription errors may be present.

## 2021-11-21 NOTE — DISCHARGE INSTR - DIET
If you have IBD, you might not be able to digest all the food you eat, so you may need more vitamins and minerals. Your medications might affect your ability to eat or how your body absorbs nutrients. How much fiber you should eat depends on your symptoms and the amount of inflammation in your intestines. If you are taking prednisone or budesonide medications, then you should limit the amount of fiber that you are eating. If you are experiencing symptoms like diarrhea, abdominal pain, low-fiber foods are the easiest to digest and are less irritating for your intestines. If you don't have symptoms, then check with your health care provider or registered dietitian nutritionist (RDN) to see if you may add more fiber to your diet. It's also important to eat enough protein foods while you are on the IBD diet. Tips  Eat small meals or snacks every 3 or 4 hours. Do not skip meals. When you have symptoms, or if you are taking prednisone or budesonide, eat the foods in the Recommended Foods chart. These foods are lower in fiber. Eat a protein food or dairy product at every meal or snack if your body can tolerate it. See the Foods Recommended table for ideas. Drink a lot of fluids, at least 8 cups each day. Limit caffeinated, sugary drinks and beverages made with sugar substitutes. Eat foods that have probiotics (yogurt, kefir) and prebiotics (bananas). Ask your RDN for other suggestions. You may need to take supplements as part of your IBD treatment. Take a chewable multivitamin with minerals. Ask your RDN for recommendations. If you are taking methotrexate or sulfasalazine, take one multivitamin with minerals and a supplement with 1 milligram of folic acid daily. Take a chewable calcium supplement with vitamin D if you are not getting enough calcium from your diet. Check with your health care provider before starting probiotic or prebiotic supplements.   When you don't have symptoms (no blood in your stools), you are no longer taking prednisone or budesonide, and your inflammation is mild, your health care provider may recommend that you begin including whole grains and a variety of fruits and vegetables in your diet. Only add one to two new foods to your diet each week in small amounts and monitor your symptoms. Stop eating the new food if you develop abdominal pain or diarrhea. You can try it again after a few weeks. Foods Recommended  These foods are low in fiber. You can include them in your diet anytime, but they should be part of your diet when you have diarrhea and abdominal pain. Food Group Notes Foods Recommended   Grains Choose grain foods with less than 2 grams of fiber per serving. The grams of fiber in a serving are listed on the Nutrition Facts label of packaged foods. Any containing milk may contain lactose. Bread, bagels, rolls, crackers, cereals, and pasta made from white or refined flour  White rice  Cream of wheat or rice  Grits, refined  Cereals made from refined grains without added fiber, and low in sugar   Protein Foods Use broth or water to cook meats at a lower temperature or cover the dish when baked in the oven, so the food cooks in its own juices. Crockpots work well with low heat and slow cooking to make meats tender. Or marinate meats first with an acidic ingredient, such as vinegar and oil, lemon juice, wine, or by using chopped raw pineapple and then pour it off before cooking. Cook protein foods well to reduce bacteria. Tender, well-cooked meats, prepared without added fat: poultry, fish, lean beef and pork  Deli meats--tender, thinly sliced; heat to steaming  Eggs, well-cooked  Tofu  Smooth nut and seed butters: Peanut, almond, and sunflower seed   Dairy Choose lactose-free products if you have lactose intolerance.  Symptoms of lactose intolerance occur after drinking regular milk or eating foods made from milk (milk solids, whey, cream, butter, or products with may contain milk on the label). Symptoms include diarrhea, nausea, abdominal pain, and bloating. Choose yogurt with live active cultures (check labels). Foods marked with an asterisk (*) have lactose. Buttermilk*  Evaporated, fat-free, 1%, and 2% milk*  Lactose-free milk  Fortified non-dairy milks: almond, cashew, coconut, or rice (be aware that these options are not good sources of protein so you will need to eat an additional protein food)  Fortified pea milk and soymilk (may cause gas and bloating)  Yogurt*/lactose-free yogurt  Low-fat cheese* (aged and hard cheeses such as cheddar, swiss, or parmesan may have less lactose and result in less symptoms; limit to 1 to 2 ounces per serving to decrease lactose intake)  Cottage cheese*/lactose-free cottage cheese  Low-fat ice cream*/lactose-free ice cream  [place]   Vegetables See the Foods Not Recommended table for vegetables you should avoid when you have diarrhea or abdominal pain. Well-cooked vegetables without seeds or skins, such as green beans or carrots  Potatoes (white, red, or yellow) without skin  Sweet potatoes contain more fiber; remove skin and do not eat more than half at one meal    Strained vegetable juice  Summer squash: yellow or zucchini without skins or seeds   Fruit See the Foods Not Recommended table of fruits you should avoid when you have diarrhea or abdominal pain. Fruit juices diluted by half with water may be tolerated better. Apple, peeled  Banana, ripe  Melons: cantaloupe, honeydew, watermelon  Canned, soft fruits or fruit cups (in juice); avoid pineapple   Oils Limit fats and oils to less than 8 teaspoons per day. Choose oils more often than solid fats Vegetable oils: canola, olive, peanut Mayonnaise   Beverages   Water  Decaffeinated coffee  Caffeine-free tea  Rehydration beverages   Foods Not Recommended  These foods are higher in fat and fiber and should not be eaten when you have diarrhea and abdominal pain.  It is OK to eat these foods if you do not have symptoms and your inflammation is low. Food Group Notes Foods Not Recommended   Grains Do not eat grains foods with 2 or more grams of fiber per serving. Whole wheat or whole grain breads, rolls, crackers, or pasta  Brown or wild rice  Quinoa  Cereals made from whole grains; oatmeal, bran or shredded wheat    Any grain foods made with seeds or nuts  Popcorn   Protein Foods   Fried eggs and meats, including sausage and potter  Lunch meats, such as bologna or salami  Hot dogs  Tough or chewy cuts of meat (grilled steak or pork chops)  All dried beans and peas; hummus  Nuts and coconut  Ivor nut butters   Dairy Avoid higher fiber or higher fat foods that may not be tolerated as well. Fruited yogurt or yogurt with granola or mix-ins  Whole milk  Half-and-half, cream, sour cream  Ice cream (unless it is low-fat or fat-free)   Vegetables The vegetables listed here are gas forming and/or have a high amount of fiber. Beets, broccoli, brussels sprouts, cabbage and sauerkraut, cauliflower, corn, greens (mustard, turnip, collards), green peas, lima beans, mushrooms, okra, onions, parsnips, peppers, potato skins, salads, spinach, winter squash   Fruit Canned fruit in heavy syrup and sweetened juices have a lot of sugar, which may make diarrhea worse. All raw fruits except for ones on Foods Recommended table   Berries, canned cherries  Dried fruits, including raisins and prunes  Prune juice and sweetened fruit juices   Beverages Drinking beverages with sugar or corn syrup may make diarrhea worse. Drinks with caffeine, such as coffee, tea, cola, some sport drinks  Sugar-free drinks with sugar substitutes (aspartame, sucralose, sorbitol)  Sugary drinks: sweet tea, drink packets added to water  Alcoholic drinks  Soda or other beverages made with sugar or corn syrup if they make diarrhea worse. Other Sugar alcohols may cause diarrhea.  Sugar alcohols (erythritol, mannitol, sorbitol, xylitol), which are often found in sugar-free gum and candy, as well as some medications.     Inflammatory Bowel Disease Sample 1-Day Menu   Breakfast 1 slice white toast  2 scrambled eggs  1 teaspoon margarine, soft, tub  1 ripe banana   Morning Snack 1 cup lactose-free nutrition supplement  6 saltine crackers  1 tablespoon smooth peanut butter   Lunch 2 slices white bread  3 ounces tuna  1 tablespoon mayonnaise  10 wedges baked potato without skin   Afternoon Snack 1 cup puffed rice cereal  ½ cup lactose-free milk   Evening Meal 3 ounces baked chicken  1 cup white rice  ½ cup cooked carrots  1 white dinner roll     Dietitian Contact for further questions; [phone]

## 2021-11-21 NOTE — PROGRESS NOTES
Comprehensive Nutrition Assessment    Type and Reason for Visit:  Initial, Positive Nutrition Screen    Nutrition Recommendations/Plan: Continue Current Diet, Start Oral Nutrition Supplement    Nutrition Assessment:  Pt admit w/ severe abd pain 2/2 crohn's/ colitis w/ rectal bleeding. Noted nausea & decreased appetite x 1 mon. Limited wt hx on file to assess changes. Will add Magic Cup BID & monitor PO intakes. Malnutrition Assessment:  Malnutrition Status:  Insufficient data    Context:  Chronic Illness     Findings of the 6 clinical characteristics of malnutrition:  Energy Intake:  75% or less estimated energy requirements for 1 month or longer  Weight Loss:  Unable to assess (limited wt hx on file)     Body Fat Loss:  Unable to assess     Muscle Mass Loss:  Unable to assess    Fluid Accumulation:  No significant fluid accumulation     Strength:  Not Performed    Estimated Daily Nutrient Needs:  Energy (kcal):  MSJ 1662 x 1.2 SF= 6376-3423; Weight Used for Energy Requirements:  Current     Protein (g):  ; Weight Used for Protein Requirements:  Current (1.3-1.5 g/kg)        Fluid (ml/day):  7514-6798; Method Used for Fluid Requirements:  1 ml/kcal      Nutrition Related Findings:  A&Ox4, fluid bal WNL, no edema, active BS, nausea & diarrhea PTA, loss of appetite      Wounds:  None       Current Nutrition Therapies:    ADULT DIET; Regular    Anthropometric Measures:  · Height: 5' 9\" (175.3 cm)  · Current Body Weight: 148 lb (67.1 kg) (no method, UTO d/t iso)   · Usual Body Weight: 150 lb (68 kg) (10/14 actual , no further hx on file)     · Ideal Body Weight: 160 lbs; % Ideal Body Weight 92.5 %   · BMI: 21.8 BMI Categories: Normal Weight (BMI 18.5-24. 9)       Nutrition Diagnosis:   · Inadequate oral intake related to altered GI function (Crohn's disease) as evidenced by poor intake prior to admission, nausea, diarrhea, intake 26-50%    Nutrition Interventions:    Nutrition Education/Counseling:

## 2021-11-21 NOTE — FLOWSHEET NOTE
According to [address] in [place] the order to check stool for CDiff today was canceled  because their policy is only 1 stool can be tested every 7 days and pt had stool checked for CDiff on 11/17/21

## 2021-11-22 VITALS
RESPIRATION RATE: 18 BRPM | HEART RATE: 77 BPM | OXYGEN SATURATION: 98 % | WEIGHT: 148 LBS | TEMPERATURE: 97.5 F | SYSTOLIC BLOOD PRESSURE: 134 MMHG | BODY MASS INDEX: 21.92 KG/M2 | DIASTOLIC BLOOD PRESSURE: 68 MMHG | HEIGHT: 69 IN

## 2021-11-22 LAB
ALBUMIN SERPL-MCNC: 3.1 G/DL (ref 3.5–5.2)
ALP BLD-CCNC: 53 U/L (ref 40–129)
ALT SERPL-CCNC: 42 U/L (ref 0–40)
ANION GAP SERPL CALCULATED.3IONS-SCNC: 11 MMOL/L (ref 7–16)
AST SERPL-CCNC: 52 U/L (ref 0–39)
BASOPHILIC STIPPLING: ABNORMAL
BASOPHILS ABSOLUTE: 0.04 E9/L (ref 0–0.2)
BASOPHILS RELATIVE PERCENT: 0.4 % (ref 0–2)
BILIRUB SERPL-MCNC: <0.2 MG/DL (ref 0–1.2)
BUN BLDV-MCNC: 11 MG/DL (ref 6–20)
CALCIUM SERPL-MCNC: 8.4 MG/DL (ref 8.6–10.2)
CHLORIDE BLD-SCNC: 105 MMOL/L (ref 98–107)
CO2: 25 MMOL/L (ref 22–29)
CREAT SERPL-MCNC: 0.9 MG/DL (ref 0.7–1.2)
EOSINOPHILS ABSOLUTE: 0.01 E9/L (ref 0.05–0.5)
EOSINOPHILS RELATIVE PERCENT: 0.1 % (ref 0–6)
GFR AFRICAN AMERICAN: >60
GFR NON-AFRICAN AMERICAN: >60 ML/MIN/1.73
GLUCOSE BLD-MCNC: 133 MG/DL (ref 74–99)
HCT VFR BLD CALC: 33.9 % (ref 37–54)
HEMOGLOBIN: 11.5 G/DL (ref 12.5–16.5)
IMMATURE GRANULOCYTES #: 0.13 E9/L
IMMATURE GRANULOCYTES %: 1.2 % (ref 0–5)
LYMPHOCYTES ABSOLUTE: 1.16 E9/L (ref 1.5–4)
LYMPHOCYTES RELATIVE PERCENT: 10.7 % (ref 20–42)
MCH RBC QN AUTO: 30 PG (ref 26–35)
MCHC RBC AUTO-ENTMCNC: 33.9 % (ref 32–34.5)
MCV RBC AUTO: 88.5 FL (ref 80–99.9)
MONOCYTES ABSOLUTE: 0.56 E9/L (ref 0.1–0.95)
MONOCYTES RELATIVE PERCENT: 5.2 % (ref 2–12)
NEUTROPHILS ABSOLUTE: 8.97 E9/L (ref 1.8–7.3)
NEUTROPHILS RELATIVE PERCENT: 82.4 % (ref 43–80)
OVALOCYTES: ABNORMAL
PDW BLD-RTO: 13.3 FL (ref 11.5–15)
PLATELET # BLD: 255 E9/L (ref 130–450)
PMV BLD AUTO: 10.5 FL (ref 7–12)
POIKILOCYTES: ABNORMAL
POLYCHROMASIA: ABNORMAL
POTASSIUM REFLEX MAGNESIUM: 4.3 MMOL/L (ref 3.5–5)
RBC # BLD: 3.83 E12/L (ref 3.8–5.8)
SODIUM BLD-SCNC: 141 MMOL/L (ref 132–146)
TOTAL PROTEIN: 5.7 G/DL (ref 6.4–8.3)
WBC # BLD: 10.9 E9/L (ref 4.5–11.5)

## 2021-11-22 PROCEDURE — 6360000002 HC RX W HCPCS: Performed by: INTERNAL MEDICINE

## 2021-11-22 PROCEDURE — 2500000003 HC RX 250 WO HCPCS: Performed by: FAMILY MEDICINE

## 2021-11-22 PROCEDURE — 80053 COMPREHEN METABOLIC PANEL: CPT

## 2021-11-22 PROCEDURE — 99252 IP/OBS CONSLTJ NEW/EST SF 35: CPT | Performed by: STUDENT IN AN ORGANIZED HEALTH CARE EDUCATION/TRAINING PROGRAM

## 2021-11-22 PROCEDURE — 85025 COMPLETE CBC W/AUTO DIFF WBC: CPT

## 2021-11-22 PROCEDURE — 2580000003 HC RX 258: Performed by: FAMILY MEDICINE

## 2021-11-22 PROCEDURE — 36415 COLL VENOUS BLD VENIPUNCTURE: CPT

## 2021-11-22 RX ORDER — FAMOTIDINE 40 MG/1
20 TABLET, FILM COATED ORAL 2 TIMES DAILY
Qty: 30 TABLET | Refills: 3 | Status: SHIPPED | OUTPATIENT
Start: 2021-11-22 | End: 2022-03-03 | Stop reason: SDUPTHER

## 2021-11-22 RX ORDER — METRONIDAZOLE 500 MG/1
500 TABLET ORAL 3 TIMES DAILY
Qty: 15 TABLET | Refills: 0 | Status: SHIPPED | OUTPATIENT
Start: 2021-11-22 | End: 2021-11-27

## 2021-11-22 RX ORDER — CIPROFLOXACIN 500 MG/1
500 TABLET, FILM COATED ORAL 2 TIMES DAILY
Qty: 10 TABLET | Refills: 0 | Status: SHIPPED | OUTPATIENT
Start: 2021-11-22 | End: 2021-11-27

## 2021-11-22 RX ORDER — PREDNISONE 10 MG/1
TABLET ORAL
Qty: 77 TABLET | Refills: 0 | Status: SHIPPED | OUTPATIENT
Start: 2021-11-22 | End: 2021-12-27

## 2021-11-22 RX ADMIN — SODIUM CHLORIDE: 9 INJECTION, SOLUTION INTRAVENOUS at 06:26

## 2021-11-22 RX ADMIN — METRONIDAZOLE 500 MG: 500 INJECTION, SOLUTION INTRAVENOUS at 11:57

## 2021-11-22 RX ADMIN — METRONIDAZOLE 500 MG: 500 INJECTION, SOLUTION INTRAVENOUS at 02:23

## 2021-11-22 RX ADMIN — SODIUM CHLORIDE, PRESERVATIVE FREE 10 ML: 5 INJECTION INTRAVENOUS at 09:06

## 2021-11-22 RX ADMIN — METHYLPREDNISOLONE SODIUM SUCCINATE 40 MG: 40 INJECTION, POWDER, FOR SOLUTION INTRAMUSCULAR; INTRAVENOUS at 09:06

## 2021-11-22 RX ADMIN — METHYLPREDNISOLONE SODIUM SUCCINATE 40 MG: 40 INJECTION, POWDER, FOR SOLUTION INTRAMUSCULAR; INTRAVENOUS at 02:23

## 2021-11-22 RX ADMIN — SODIUM CHLORIDE: 9 INJECTION, SOLUTION INTRAVENOUS at 15:32

## 2021-11-22 RX ADMIN — METHYLPREDNISOLONE SODIUM SUCCINATE 40 MG: 40 INJECTION, POWDER, FOR SOLUTION INTRAMUSCULAR; INTRAVENOUS at 15:32

## 2021-11-22 ASSESSMENT — PAIN SCALES - GENERAL: PAINLEVEL_OUTOF10: 0

## 2021-11-22 NOTE — PROGRESS NOTES
exacerbation  -Abdominal pain and diarrhea is improving  -Patient was diagnosed few months ago  -Symptoms improving with IV steroids  -Resume IV antibiotic Rocephin and Flagyl  -Pending C. Difficile  -Follow stool culture results  -Discontinue mesalamine enema since patient reporting bleeding after each use  -Patient was on mesalamine pills and was switched to enema per GI  - Await GI eval       DISPOSITION: Home as early as today if cleared by GI     Medications:  REVIEWED DAILY    Infusion Medications    sodium chloride      sodium chloride 100 mL/hr at 11/22/21 0626     Scheduled Medications    sodium chloride flush  10 mL IntraVENous 2 times per day    enoxaparin  40 mg SubCUTAneous Daily    cefTRIAXone (ROCEPHIN) IV  1,000 mg IntraVENous Q24H    metroNIDAZOLE  500 mg IntraVENous Q8H    methylPREDNISolone  40 mg IntraVENous Q6H     PRN Meds: dicyclomine, sodium chloride flush, sodium chloride, promethazine **OR** ondansetron, acetaminophen **OR** acetaminophen, fentanNYL    Labs:     Recent Labs     11/19/21 2240 11/21/21 0449 11/22/21  0612   WBC 14.5* 13.1* 10.9   HGB 13.9 11.0* 11.5*   HCT 38.4 31.7* 33.9*    245 255       Recent Labs     11/19/21 2240 11/21/21 0449 11/22/21  0612    141 141   K 3.4* 3.9 4.3    107 105   CO2 20* 22 25   BUN 8 10 11   CREATININE 1.3* 1.0 0.9   CALCIUM 8.9 8.4* 8.4*       Recent Labs     11/19/21 2240 11/21/21 0449 11/22/21  0612   PROT 6.9 5.5* 5.7*   ALKPHOS 63 51 53   ALT 7 15 42*   AST 17 16 52*   BILITOT 0.7 <0.2 <0.2   LIPASE 33  --   --        No results for input(s): INR in the last 72 hours. No results for input(s): Shanda Parisian in the last 72 hours.     Chronic labs:    No results found for: CHOL, TRIG, HDL, LDLCALC, TSH, PSA, INR, LABA1C    Radiology: REVIEWED DAILY    +++++++++++++++++++++++++++++++++++++++++++++++++  Kelly Flower MD  [facility] Physician - [address], [place]  +++++++++++++++++++++++++++++++++++++++++++++++++  NOTE: This report was transcribed using voice recognition software. Every effort was made to ensure accuracy; however, inadvertent computerized transcription errors may be present.

## 2021-11-22 NOTE — PLAN OF CARE
Problem: SAFETY  Goal: LTG - patient will utilize safety techniques  Outcome: Met This Shift     Problem: Inadequate oral food/beverage intake (NI-2.1)  Goal: Food and/or Nutrient Delivery  Description: Individualized approach for food/nutrient provision.   11/21/2021 1041 by Cosme Livingston RD, LD  Outcome: Met This Shift

## 2021-11-22 NOTE — CONSULTS
Pulse 77   Temp 97.5 °F (36.4 °C) (Temporal)   Resp 18   Ht 5' 9\" (1.753 m)   Wt 148 lb (67.1 kg)   SpO2 98%   BMI 21.86 kg/m²   Physical Exam:  General: Overall well-appearing, NAD  HEENT: PERRLA, EOMI, Anicteric sclera, MMM, no rhinorrhea  Cards: RRR, no LE edema  Resp: Breathing comfortably on room air, good air movement, no use of accessory muscles, no audible wheezing  Abdomen: soft, NT, ND. Tenderness to palpation in the LUQ/costal region. Extremities: Moves all extremities, no effusions or bruising.   Skin: No rashes or jaundice  Neuro: A&O x 3, CN grossly intact, non-focal exam              Electronically signed by Harpal Littlejohn MD on 11/22/2021 at 11:22 PM

## 2021-11-22 NOTE — CONSULTS
Kathleen Lopez is a 22 yo male with a PMHx of newly diagnosed Crohn's (dx Sept 2021). He was admitted on 11/20 due to worsening abdominal pain, nausea, and loose, bloody stools. Patient states that he was put on medications (mesalamine, bentyl) when he was initially diagnosed with Crohn's and those helped control his symptoms for a while, but over the last month he has been worsening, with nausea, abdominal pain, and bloody diarrhea. Patient follows with GI, Dr. Asad Treadwell, and just saw the physicians in the group on 11/17. He had stool samples done and was subsequently put on Flagyl at this time. A couple of days later, the patient had worsening abdominal pain, which he described as burning and stabbing and the patient rated an 8/10 at its peak. He also continued to have bloody diarrhea at this time, at which time his doctor told him to discontinue his mesalamine enema and bentyl. When his symptoms did not resolve, he was told to start the enema back up, which also did not improve his symptoms. Patient states that his abdominal pain is not localized, but is greater on the right than the left. He does note that since the end of August, he has lost about 15-20lbs, with a 5lb weight loss occurring over the last week. His appetite has been decreased over this time due to his nausea. Patient is otherwise healthy and takes no other medications. His last EGD/colonoscopy was in Sept 2021 when he was first diagnosed with Crohn's. EGD reportedly normal and colonoscopy showed possible Crohn's in the transverse colon as well as involvement of the anorectal region. Patient has had no abdominal surgeries. No family hx of Crohn's or colitis, but reports that his paternal grandfather had colorectal cancer (diagnosed at age [de-identified]). Patient does not drink alcohol or use any tobacco products. CT abd/pelvis on admission showed diffuse thickening of descending and sigmoid colon with lack of haustra markings.  Lab studies were significant for Cr 1.3, WBC 14.5, K 3.4. Was started on empiric abx at admission. Today the patient reports that he is feeling significantly better. His abdominal pain is now a 1/10. He still is having loose stools, but there is now some form to them. Some blood is still noted in the stools still, but it is improved since initial admission. His appetite is also improved and he is not having any n/v. He remains on 100cc/hr of NS and continues to receive rocephin 1g daily, flagyl 500mg q8h, and 40mg solumedrol q6h. Lab studies show an improved WBC count (down to 10.9), Hb 11.5, albumin 3.1, and calcium 8.4.

## 2021-11-22 NOTE — PLAN OF CARE
Problem: SAFETY  Goal: LTG - patient will utilize safety techniques  11/22/2021 0112 by Dragan Gomez RN  Outcome: Met This Shift  11/21/2021 2228 by Varun Fernandez RN  Outcome: Met This Shift     Problem: Infection:  Goal: Will remain free from infection  Description: Will remain free from infection  Outcome: Met This Shift     Problem: Safety:  Goal: Free from accidental physical injury  Description: Free from accidental physical injury  Outcome: Met This Shift  Goal: Free from intentional harm  Description: Free from intentional harm  Outcome: Met This Shift     Problem: Daily Care:  Goal: Daily care needs are met  Description: Daily care needs are met  Outcome: Met This Shift     Problem: Pain:  Goal: Patient's pain/discomfort is manageable  Description: Patient's pain/discomfort is manageable  Outcome: Met This Shift

## 2021-11-22 NOTE — PLAN OF CARE
Problem: SAFETY  Goal: LTG - patient will utilize safety techniques  11/22/2021 1722 by Bunny Benson RN  Outcome: Completed     Problem: Infection:  Goal: Will remain free from infection  Description: Will remain free from infection  11/22/2021 1722 by Bunny Benson RN  Outcome: Completed     Problem: Safety:  Goal: Free from accidental physical injury  Description: Free from accidental physical injury  11/22/2021 1722 by Bunny Benson RN  Outcome: Completed     Problem: Safety:  Goal: Free from intentional harm  Description: Free from intentional harm  11/22/2021 1722 by Bunny Benson RN  Outcome: Completed     Problem: Daily Care:  Goal: Daily care needs are met  Description: Daily care needs are met  11/22/2021 1722 by Bunny Benson RN  Outcome: Completed     Problem: Pain:  Goal: Patient's pain/discomfort is manageable  Description: Patient's pain/discomfort is manageable  11/22/2021 1722 by Bunny Benson RN  Outcome: Completed     Problem: Daily Care:  Goal: Daily care needs are met  Description: Daily care needs are met  11/22/2021 1722 by Bunny Benson RN  Outcome: Completed

## 2021-11-22 NOTE — PLAN OF CARE
Problem: SAFETY  Goal: LTG - patient will utilize safety techniques  11/22/2021 1108 by Maria Dolores Horvath RN  Outcome: Ongoing     Problem: Infection:  Goal: Will remain free from infection  Description: Will remain free from infection  11/22/2021 1108 by Maria Dolores Horvath RN  Outcome: Ongoing     Problem: Safety:  Goal: Free from accidental physical injury  Description: Free from accidental physical injury  11/22/2021 1108 by Maria Dolores Horvath RN  Outcome: Ongoing     Problem: Safety:  Goal: Free from intentional harm  Description: Free from intentional harm  11/22/2021 1108 by Maria Dolores Horvath RN  Outcome: Ongoing     Problem: Daily Care:  Goal: Daily care needs are met  Description: Daily care needs are met  11/22/2021 1108 by Maria Dolores Horvath RN  Outcome: Ongoing     Problem: Pain:  Goal: Patient's pain/discomfort is manageable  Description: Patient's pain/discomfort is manageable  11/22/2021 1108 by Maria Dolores Horvath RN  Outcome: Ongoing

## 2021-11-22 NOTE — PROGRESS NOTES
0800-Assessment complete and charted patient awake in bed denies abdominal pain and nausea at this time call light within reach will continue to monitor. 1728-Discharge instructions and follow up appointments reviewed with Patient and family. Patient informed that medications were sent to local pharmacy for  awaiting transport for discharge.

## 2021-11-23 ENCOUNTER — TELEPHONE (OUTPATIENT)
Dept: FAMILY MEDICINE CLINIC | Age: 21
End: 2021-11-23

## 2021-11-23 LAB — CULTURE, STOOL: NORMAL

## 2021-11-23 NOTE — TELEPHONE ENCOUNTER
[facility] Trans[facility] Initial Follow Up Call    Outreach made within 2 business days of discharge: Yes    Patient: Janneth Singletary Patient : 2000   MRN: <[id number]>  Reason for Admission: Colitis  Discharge Date: 21       Spoke with: Unable to reach,Left message to return call. Discharge department/facility: [facility]      Scheduled appointment with PCP within 7-14 days    Follow Up  No future appointments.     Gabriela Urbina MA

## 2021-11-24 ENCOUNTER — TELEPHONE (OUTPATIENT)
Dept: FAMILY MEDICINE CLINIC | Age: 21
End: 2021-11-24

## 2021-11-24 NOTE — TELEPHONE ENCOUNTER
Spoke kodi patient. Declined follow up with PCP at this time. States he is going to follow up with gastroenterologist at this time.

## 2021-11-24 NOTE — TELEPHONE ENCOUNTER
[facility] Transitions Initial Follow Up Call    Outreach made within 2 business days of discharge: Yes    Patient: Callie Johnson Patient : 2000   MRN: <[id number]>  Reason for Admission: Colitis  Discharge Date: 21       Spoke with:     Discharge department/facility: [address] [place] [address]    TCM Interactive Patient Contact:  Was patient able to fill all prescriptions: Yes  Was patient instructed to bring all medications to the follow-up visit: Yes  Is patient taking all medications as directed in the discharge summary? Yes  Does patient understand their discharge instructions: Yes  Does patient have questions or concerns that need addressed prior to 7-14 day follow up office visit: no    Scheduled appointment with PCP within 7-14 days    Follow Up  No future appointments.     Pamela Casey MA

## 2021-11-25 LAB
BLOOD CULTURE, ROUTINE: NORMAL
CULTURE, BLOOD 2: NORMAL

## 2021-12-02 ENCOUNTER — OFFICE VISIT (OUTPATIENT)
Dept: GASTROENTEROLOGY | Age: 21
End: 2021-12-02
Payer: COMMERCIAL

## 2021-12-02 VITALS
HEART RATE: 95 BPM | BODY MASS INDEX: 20.66 KG/M2 | WEIGHT: 139.5 LBS | RESPIRATION RATE: 18 BRPM | SYSTOLIC BLOOD PRESSURE: 122 MMHG | DIASTOLIC BLOOD PRESSURE: 77 MMHG | HEIGHT: 69 IN | OXYGEN SATURATION: 98 % | TEMPERATURE: 98 F

## 2021-12-02 DIAGNOSIS — K52.3 INDETERMINATE COLITIS: ICD-10-CM

## 2021-12-02 DIAGNOSIS — K52.3 INDETERMINATE COLITIS: Primary | ICD-10-CM

## 2021-12-02 LAB
ALBUMIN SERPL-MCNC: 3.7 G/DL (ref 3.5–5.2)
ALP BLD-CCNC: 47 U/L (ref 40–129)
ALT SERPL-CCNC: 15 U/L (ref 0–40)
ANION GAP SERPL CALCULATED.3IONS-SCNC: 3 MMOL/L (ref 7–16)
AST SERPL-CCNC: 13 U/L (ref 0–39)
BASOPHILS ABSOLUTE: 0.04 E9/L (ref 0–0.2)
BASOPHILS RELATIVE PERCENT: 0.3 % (ref 0–2)
BILIRUB SERPL-MCNC: 0.3 MG/DL (ref 0–1.2)
BUN BLDV-MCNC: 13 MG/DL (ref 6–20)
C-REACTIVE PROTEIN: 0.7 MG/DL (ref 0–0.4)
CALCIUM SERPL-MCNC: 8.7 MG/DL (ref 8.6–10.2)
CHLORIDE BLD-SCNC: 98 MMOL/L (ref 98–107)
CO2: 28 MMOL/L (ref 22–29)
CREAT SERPL-MCNC: 1.1 MG/DL (ref 0.7–1.2)
EOSINOPHILS ABSOLUTE: 0.04 E9/L (ref 0.05–0.5)
EOSINOPHILS RELATIVE PERCENT: 0.3 % (ref 0–6)
FOLATE: >20 NG/ML (ref 4.8–24.2)
GFR AFRICAN AMERICAN: >60
GFR NON-AFRICAN AMERICAN: >60 ML/MIN/1.73
GLUCOSE BLD-MCNC: 108 MG/DL (ref 74–99)
HCT VFR BLD CALC: 32 % (ref 37–54)
HEMOGLOBIN: 10.5 G/DL (ref 12.5–16.5)
IMMATURE GRANULOCYTES #: 0.09 E9/L
IMMATURE GRANULOCYTES %: 0.6 % (ref 0–5)
IRON SATURATION: 10 % (ref 20–55)
IRON: 23 MCG/DL (ref 59–158)
LYMPHOCYTES ABSOLUTE: 0.98 E9/L (ref 1.5–4)
LYMPHOCYTES RELATIVE PERCENT: 6.5 % (ref 20–42)
MCH RBC QN AUTO: 29.7 PG (ref 26–35)
MCHC RBC AUTO-ENTMCNC: 32.8 % (ref 32–34.5)
MCV RBC AUTO: 90.4 FL (ref 80–99.9)
MONOCYTES ABSOLUTE: 0.68 E9/L (ref 0.1–0.95)
MONOCYTES RELATIVE PERCENT: 4.5 % (ref 2–12)
NEUTROPHILS ABSOLUTE: 13.21 E9/L (ref 1.8–7.3)
NEUTROPHILS RELATIVE PERCENT: 87.8 % (ref 43–80)
PDW BLD-RTO: 13.9 FL (ref 11.5–15)
PLATELET # BLD: 341 E9/L (ref 130–450)
PMV BLD AUTO: 9.7 FL (ref 7–12)
POTASSIUM SERPL-SCNC: 4.1 MMOL/L (ref 3.5–5)
RBC # BLD: 3.54 E12/L (ref 3.8–5.8)
SEDIMENTATION RATE, ERYTHROCYTE: 42 MM/HR (ref 0–15)
SODIUM BLD-SCNC: 129 MMOL/L (ref 132–146)
TOTAL IRON BINDING CAPACITY: 235 MCG/DL (ref 250–450)
TOTAL PROTEIN: 6.6 G/DL (ref 6.4–8.3)
VITAMIN B-12: 431 PG/ML (ref 211–946)
VITAMIN D 25-HYDROXY: 12 NG/ML (ref 30–100)
WBC # BLD: 15 E9/L (ref 4.5–11.5)

## 2021-12-02 PROCEDURE — 99213 OFFICE O/P EST LOW 20 MIN: CPT | Performed by: STUDENT IN AN ORGANIZED HEALTH CARE EDUCATION/TRAINING PROGRAM

## 2021-12-02 RX ORDER — PREDNISONE 10 MG/1
10 TABLET ORAL DAILY
Qty: 60 TABLET | Refills: 5 | Status: SHIPPED | OUTPATIENT
Start: 2021-12-02 | End: 2021-12-12

## 2021-12-02 RX ORDER — HYDROCORTISONE ACETATE 25 MG/1
25 SUPPOSITORY RECTAL 2 TIMES DAILY
Qty: 60 SUPPOSITORY | Refills: 5 | Status: ON HOLD
Start: 2021-12-02 | End: 2022-01-04 | Stop reason: ALTCHOICE

## 2021-12-02 RX ORDER — PREDNISONE 1 MG/1
5 TABLET ORAL DAILY
Qty: 60 TABLET | Refills: 5 | Status: SHIPPED | OUTPATIENT
Start: 2021-12-02 | End: 2021-12-12

## 2021-12-02 RX ORDER — DICYCLOMINE HCL 20 MG
20 TABLET ORAL 4 TIMES DAILY PRN
Qty: 60 TABLET | Refills: 5 | Status: SHIPPED
Start: 2021-12-02 | End: 2022-07-10 | Stop reason: SDUPTHER

## 2021-12-02 RX ORDER — SODIUM, POTASSIUM,MAG SULFATES 17.5-3.13G
1 SOLUTION, RECONSTITUTED, ORAL ORAL ONCE
Qty: 1 EACH | Refills: 0 | Status: SHIPPED | OUTPATIENT
Start: 2021-12-02 | End: 2021-12-02

## 2021-12-02 NOTE — PROGRESS NOTES
Gastroenterology Outpatient Progress Note      SUBJECTIVE:      Mr. Marisabel Navarrete is a 20y/M recently diagnosed with indeterminate IBD w/ colonoscopy showing involvement of the proximal transverse colon and rectum and recent admission for worsening of symptoms consistent w/ IBD flare. He was placed on antibiotics and steroids and with improvement of symptoms. Since discharge, the patient has finished a 7 day course of ciprofloxacin and metronidazole and has been on steroid taper (finished 40mg, on day 3 of 30 mg prednisone). He states that his pain/cramping is slightly improved since d/c but is still present throughout his abdomen. He continues to have diarrhea. He reports having semi-solid stools with no bleeding immediately after his steroid dose in the morning, but by end of the day he has very loose and bloody stools. Overall, he does not feel like he has improved since discharge. The patient's father is also concerned that he has had a roughly 25 lb weight loss since diagnosis despite his appetite and oral intake being unaffected. The patient denies any extra-intestinal manifestations of IBD including rashes, joint pain, or issues with vision. OBJECTIVE    Physical    VITALS:  /77 (Site: Right Upper Arm, Position: Sitting, Cuff Size: Medium Adult)   Pulse 95   Temp 98 °F (36.7 °C) (Infrared)   Resp 18   Ht 5' 9\" (1.753 m)   Wt 139 lb 8 oz (63.3 kg)   SpO2 98%   BMI 20.60 kg/m²   Physical Exam:  General: Overall well-appearing, NAD  HEENT: PERRLA, EOMI, Anicteric sclera, MMM, no rhinorrhea  Cards: RRR, no LE edema  Resp: Breathing comfortably on room air, good air movement, no use of accessory muscles, no audible wheezing  Abdomen: soft, ND. Mild tenderness throughout. Extremities: Moves all extremities, no effusions or bruising.   Skin: No rashes or jaundice  Neuro: A&O x 3, CN grossly intact, non-focal exam    ASSESSMENT AND PLAN:    20y/M w/ recently diagnosed indeterminate IBD colitis and recent admission for flare and lack of improvement in symptoms who presents in clinic for follow-up. PLAN:  I will place the patient on a prolonged steroid taper. I will add a hydrocortisone suppository to his daily regimen. I will send of baseline IBD labs. The patient will be scheduled for a colonoscopy for disease re-staging toward the tail end of his steroid taper. I will see the patient in clinic following his endoscopic evaluation. Thank you for including us in the care of this patient. Please do not hesitate to contact us with any additional questions or concerns.     Prasanna Cedeño MD  Gastroenterology/Hepatology  Advanced Endoscopy

## 2021-12-02 NOTE — PATIENT INSTRUCTIONS
Steroid Taper:    Week 1: 30mg  Week 2: 30mg  Week 3: 25mg  Week 4: 20mg  Week 5: 15mg  Week 6: 10mg  Week 7: 5mg

## 2021-12-02 NOTE — PROGRESS NOTES
David Duarte is a 24 yo male presenting to the clinic for hospital follow up for IBD flare. Patient was recently dx with Crohn's disease in Sept 2021 and was admitted for worsening abdominal pain and loose, bloody stools. Since discharge, patient has finished 7 day course of ciprofloxacin and metronidazole and has been on steroid taper (finished 40mg, on day 3 of 30 mg prednisone). He states that his pain/cramping is slightly improved since d/c but is still present throughout his abdomen. He continues to have diarrhea. Will have semi-solid stools and no blood immediately after steroid dose in the morning, but by end of the day has very loose and bloody stools. Overall does not feel like he has improved since discharge. Patient's father is also concerned that he has had ~ 25 lb weight loss since diagnosis despite his appetite being ok.     Plan:  Hydrocortisone suppository  Prolonged prednisone taper  Colonoscopy near end of taper

## 2021-12-02 NOTE — LETTER
[address] [facility] [address]  Phone: [phone]  Fax: [phone]    Nedra Webb MD        December 2, 2021     Patient: Guerita Villasenor   YOB: 2000   Date of Visit: 12/2/2021       To Whom it May Concern:    Samuel Salgado was seen in my clinic on 12/2/2021. He may return to classes on 12/03/2021. If you have any questions or concerns, please don't hesitate to call.     Sincerely,         Betzy Teixeira, CMA

## 2021-12-04 LAB — HEPATITIS B CORE TOTAL ANTIBODY: NONREACTIVE

## 2021-12-06 LAB
COMMENT: NORMAL
HBV SURFACE AB TITR SER: NORMAL {TITER}
HEPATITIS B SURFACE ANTIGEN INTERPRETATION: NORMAL
REPORT: NORMAL

## 2021-12-09 ENCOUNTER — TELEPHONE (OUTPATIENT)
Dept: GASTROENTEROLOGY | Age: 21
End: 2021-12-09

## 2021-12-09 LAB
Lab: NORMAL
REPORT: NORMAL
THIS TEST SENT TO: NORMAL

## 2021-12-09 NOTE — TELEPHONE ENCOUNTER
Called and spoke with patient's insurance company [employer] to see if prior authorization is required for outpatient colonoscopy. Spoke to [facility] and determined no [address] [facility] is required for this procedure. Call ref # is [id number].    Electronically signed by Kris Kunz MA on 12/9/2021 at 11:16 AM

## 2021-12-16 ENCOUNTER — TELEPHONE (OUTPATIENT)
Dept: GASTROENTEROLOGY | Age: 21
End: 2021-12-16

## 2021-12-16 NOTE — LETTER
[address] [address]  Phone: [phone]  Fax: [phone]    Franki Garcia MD        December 16, 2021     Patient: Eileen Ferrer   YOB: 2000   Date of Visit: 01/04/2022       To Whom It May Concern: It is my medical opinion that Marianne Carlson should be excuse from his upcoming [employer] duty due to a previously scheduled outpatient procedure that should not be rescheduled. If you have any questions or concerns, please don't hesitate to call.     Sincerely,        Franki Garcia MD

## 2021-12-16 NOTE — TELEPHONE ENCOUNTER
Pt called in stating he got summoned for [address] duty on the same day that he is scheduled for his EGD/Colon. Pt is requesting an excuse to get out of jury duty so he does not have to reschedule this procedure. Will have Dr. Dary Choe sign excuse and mail to patient.    Electronically signed by Aldean Ahumada, MA on 12/16/2021 at 10:33 AM

## 2021-12-21 NOTE — DISCHARGE SUMMARY
Hospital Medicine Discharge Summary    Patient ID: Seth Rowe      Patient's PCP: ASTER Mcduffie CNP    Admit Date: 11/19/2021     Discharge Date: 11/22/2021     Admitting Physician: Rufino Schofield DO     Discharge Physician: Keron Jimenez MD     Discharge Diagnoses: Active Hospital Problems    Diagnosis Date Noted    Colitis [K52.9] 11/20/2021       The patient was seen and examined on day of discharge and this discharge summary is in conjunction with any daily progress note from day of discharge. Hospital Course:   Patient presented to the emergency department with complaints of abdominal pain.  Diagnosed recently with Crohn's disease. [facility] has had multiple episodes of nausea and bloody stools.  Has been in severe pain over the last 2 to 3 days.  Denies fever, chills, chest pain, shortness of breath.  Vital signs within normal limits and stable.  Laboratory studies notable for creatinine 1.3, potassium 3.4, glucose 107, WBC 14.5.  CT abdomen pelvis shows diffuse thickening of descending and sigmoid colon with lack of haustral markings.  Correlate with colitis clinically.  Patient started on empiric antibiotics and medicine consulted for admission. GI was consulted and pt to follow up outpatient with GI for management of his IBD. Discharged to home on 12/21/21 in stable condition with pcp follow up       Exam:     /68   Pulse 77   Temp 97.5 °F (36.4 °C) (Temporal)   Resp 18   Ht 5' 9\" (1.753 m)   Wt 148 lb (67.1 kg)   SpO2 98%   BMI 21.86 kg/m²       General appearance: No apparent distress, appears stated age and cooperative. HEENT:  Conjunctivae/corneas clear. Neck: Supple. No jugular venous distention. Respiratory: Clear to auscultation bilaterally, normal respiratory effort  Cardiovascular: Regular rate rhythm, normal S1-S2  Abdomen: Soft, nontender, nondistended  Musculoskeletal: No clubbing, cyanosis, no bilateral lower extremity edema. Brisk capillary refill. Skin:  No rashes  on visible skin  Neurologic: awake, alert and following commands     Consults:     IP CONSULT TO INTERNAL MEDICINE  IP CONSULT TO GI  IP CONSULT TO DIETITIAN    Significant Diagnostic Studies:     CT ABDOMEN PELVIS W IV CONTRAST Additional Contrast? None   Final Result   Diffuse thickening of the descending and sigmoid colon with lack of haustra   markings. Correlate with colitis clinically. Normal small bowel. No   definitive evidence for bowel obstruction. Disposition:  home     Discharge Instructions/Follow-up:  Keep scheduled follow up appointments. Take medications as prescribed. Code Status:  Prior     Activity: activity as tolerated    Diet: regular diet    Labs: For convenience and continuity at follow-up the following most recent labs are provided:      CBC:    Lab Results   Component Value Date    WBC 15.0 12/02/2021    HGB 10.5 12/02/2021    HCT 32.0 12/02/2021     12/02/2021       Renal:    Lab Results   Component Value Date     12/02/2021    K 4.1 12/02/2021    K 4.3 11/22/2021    CL 98 12/02/2021    CO2 28 12/02/2021    BUN 13 12/02/2021    CREATININE 1.1 12/02/2021    CALCIUM 8.7 12/02/2021       Discharge Medications:     Discharge Medication List as of 11/22/2021  5:22 PM           Details   ciprofloxacin (CIPRO) 500 MG tablet Take 1 tablet by mouth 2 times daily for 5 days, Disp-10 tablet, R-0Normal      predniSONE (DELTASONE) 10 MG tablet Take 4 tablets by mouth daily for 7 days, THEN 3 tablets daily for 7 days, THEN 2 tablets daily for 7 days, THEN 1 tablet daily for 14 days. , Disp-77 tablet, R-0Normal      famotidine (PEPCID) 40 MG tablet Take 0.5 tablets by mouth 2 times daily, Disp-30 tablet, R-3Normal              Details   metroNIDAZOLE (FLAGYL) 500 MG tablet Take 1 tablet by mouth 3 times daily for 5 days, Disp-15 tablet, R-0Normal              Details   mesalamine (ROWASA) 4 g enema Place 4 g rectally nightlyHistorical Med dicyclomine (BENTYL) 10 MG capsule Take 2 capsules by mouth 3 times daily as needed (pain), Disp-15 capsule, R-nonePrint             Time Spent on discharge is more than 45 minutes in the examination, evaluation, counseling and review of medications and discharge plan.       Signed:    Laura Zaman MD   12/21/2021

## 2022-01-03 ENCOUNTER — ANESTHESIA EVENT (OUTPATIENT)
Dept: ENDOSCOPY | Age: 22
End: 2022-01-03
Payer: COMMERCIAL

## 2022-01-03 NOTE — ANESTHESIA PRE PROCEDURE
d[facility] with [facility] bleeding [facility] K50.111    Contact dermatitis L25.9    Colitis K52.9       Past Medical History:        Diagnosis Date    Allergic     Allergic rhinitis     Hx of colonoscopy 9/27/2021    9/15/2021 colonoscopy Dr Niurka Davis probable Crohn's disease involving a 2 cm circumferential segment in the proximal transverse colon, mild in intension. Anorectal region from the anus at 20 cm with moderate granularity. Asacol HD 2 tablet TID, hydrocortisone suppositories in the evening for 2 weeks, small bowel follow through FU in 3 weeks       Past Surgical History:        Procedure Laterality Date    TONSILLECTOMY      WISDOM TOOTH EXTRACTION         Social History:    Social History     Tobacco Use    Smoking status: Never Smoker    Smokeless tobacco: Never Used   Substance Use Topics    Alcohol use: Never                                Counseling given: Not Answered      Vital Signs (Current):   Vitals:    01/03/22 1025   Weight: 145 lb (65.8 kg)   Height: 5' 9\" (1.753 m)                                              BP Readings from Last 3 Encounters:   12/02/21 122/77   11/22/21 134/68   10/14/21 108/70       NPO Status:                                                                                 BMI:   Wt Readings from Last 3 Encounters:   12/02/21 139 lb 8 oz (63.3 kg)   11/19/21 148 lb (67.1 kg)   10/14/21 150 lb 12.8 oz (68.4 kg)     Body mass index is 21.41 kg/m².     CBC:   Lab Results   Component Value Date    WBC 15.0 12/02/2021    RBC 3.54 12/02/2021    HGB 10.5 12/02/2021    HCT 32.0 12/02/2021    MCV 90.4 12/02/2021    RDW 13.9 12/02/2021     12/02/2021       CMP:   Lab Results   Component Value Date     12/02/2021    K 4.1 12/02/2021    K 4.3 11/22/2021    CL 98 12/02/2021    CO2 28 12/02/2021    BUN 13 12/02/2021    CREATININE 1.1 12/02/2021    GFRAA >60 12/02/2021    LABGLOM >60 12/02/2021    GLUCOSE 108 12/02/2021    PROT 6.6 12/02/2021    CALCIUM 8.7 12/02/2021    BILITOT 0.3 12/02/2021    ALKPHOS 47 12/02/2021    AST 13 12/02/2021    ALT 15 12/02/2021       POC Tests: No results for input(s): POCGLU, POCNA, POCK, POCCL, POCBUN, POCHEMO, POCHCT in the last 72 hours. Coags: No results found for: PROTIME, INR, APTT    HCG (If Applicable): No results found for: PREGTESTUR, PREGSERUM, HCG, HCGQUANT     ABGs: No results found for: PHART, PO2ART, ZHV5WHX, PKC6XUO, BEART, D2ASBGWU     Type & Screen (If Applicable):  No results found for: LABABO, LABRH    Drug/Infectious Status (If Applicable):  No results found for: HIV, HEPCAB    COVID-19 Screening (If Applicable): No results found for: COVID19        Anesthesia Evaluation  Patient summary reviewed  Airway: Mallampati: III  TM distance: >3 FB   Neck ROM: full  Mouth opening: > = 3 FB Dental:      Comment: Dentition intact, denies any loose teeth,    Pulmonary:Negative Pulmonary ROS and normal exam  breath sounds clear to auscultation                             Cardiovascular:Negative CV ROS            Rhythm: regular  Rate: normal                    Neuro/Psych:   Negative Neuro/Psych ROS              GI/Hepatic/Renal:            ROS comment: H/O Crohn's disease of colon with rectal bleeding . Endo/Other: Negative Endo/Other ROS   (+) blood dyscrasia: anemia:., .                 Abdominal:             Vascular: negative vascular ROS. Other Findings:           Anesthesia Plan      MAC     ASA 2       Induction: intravenous. Anesthetic plan and risks discussed with patient. Plan discussed with CRNA.                   Wilbert Horvath MD   1/3/2022

## 2022-01-03 NOTE — PROGRESS NOTES
[address] [facility]                                                                                                                    PRE OP INSTRUCTIONS FOR  Celeste Noel        Date: 1/3/2022    Date of surgery: 1/4/2022   Arrival Time: Hospital will call you between 5pm and 7pm with your final arrival time for surgery    1. Do not eat or drink anything after midnight  prior to surgery. This includes no water, chewing gum, mints or ice chips. 2. Take the following medications with a small sip of water on the morning of Surgery:  Take prednisone dos with sip water    3. Diabetics may take evening dose of insulin but none after midnight. If you feel symptomatic or low blood sugar morning of surgery drink 1-2 ounces of apple juice only. 4. Aspirin, Ibuprofen, Advil, Naproxen, Vitamin E and other Anti-inflammatory products should be stopped  before surgery  as directed by your physician. Take Tylenol only unless instructed otherwise by your surgeon. 5. Check with your Doctor regarding stopping Plavix, Coumadin, Lovenox, Eliquis, Effient, or other blood thinners. 6. Do not smoke,use illicit drugs and do not drink any alcoholic beverages 24 hours prior to surgery. 7. You may brush your teeth the morning of surgery. DO NOT SWALLOW WATER    8. You MUST make arrangements for a responsible adult to take you home after your surgery. You will not be allowed to leave alone or drive yourself home. It is strongly suggested someone stay with you the first 24 hrs. Your surgery will be cancelled if you do not have a ride home. 9. PEDIATRIC PATIENTS ONLY:  A parent/legal guardian must accompany a child scheduled for surgery and plan to stay at the hospital until the child is discharged. Please do not bring other children with you.     10. Please wear simple, loose fitting clothing to the hospital.  Do not bring valuables (money, credit cards, checkbooks, etc.) Do not wear any makeup (including no eye makeup) or nail polish on your fingers or toes. 11. DO NOT wear any jewelry or piercings on day of surgery. All body piercing jewelry must be removed. 12. Shower the night before surgery with _x__Antibacterial soap /LEVI WIPES________    13. TOTAL JOINT REPLACEMENT/HYSTERECTOMY PATIENTS ONLY---Remember to bring Blood Bank bracelet to the hospital on the day of surgery. 14. If you have a Living Will and Durable Power of  for Healthcare, please bring in a copy. 15. If appropriate bring crutches, inspirex, WALKER, CANE etc... 12. Notify your Surgeon if you develop any illness between now and surgery time, cough, cold, fever, sore throat, nausea, vomiting, etc.  Please notify your surgeon if you experience dizziness, shortness of breath or blurred vision between now & the time of your surgery. 17. If you have ___dentures, they will be removed before going to the OR; we will provide you a container. If you wear ___contact lenses or ___glasses, they will be removed; please bring a case for them. 18. To provide excellent care visitors will be limited to 2 in the room at any given time. 19. Please bring picture ID and insurance card. 20. Sleep apnea patients need to bring CPAP AND SETTINGS to hospital on day of surgery. 21. During flu season no children under the age of 15 are permitted in the hospital for the safety of all patients. 22. Other                  Please call AMBULATORY CARE if you have any further questions.    [address] [facility]     [address]

## 2022-01-04 ENCOUNTER — ANESTHESIA (OUTPATIENT)
Dept: ENDOSCOPY | Age: 22
End: 2022-01-04
Payer: COMMERCIAL

## 2022-01-04 ENCOUNTER — HOSPITAL ENCOUNTER (OUTPATIENT)
Age: 22
Setting detail: OUTPATIENT SURGERY
Discharge: HOME | End: 2022-01-04
Attending: STUDENT IN AN ORGANIZED HEALTH CARE EDUCATION/TRAINING PROGRAM | Admitting: STUDENT IN AN ORGANIZED HEALTH CARE EDUCATION/TRAINING PROGRAM
Payer: COMMERCIAL

## 2022-01-04 VITALS
DIASTOLIC BLOOD PRESSURE: 58 MMHG | TEMPERATURE: 97.2 F | SYSTOLIC BLOOD PRESSURE: 114 MMHG | BODY MASS INDEX: 21.03 KG/M2 | RESPIRATION RATE: 16 BRPM | WEIGHT: 142 LBS | HEIGHT: 69 IN | HEART RATE: 71 BPM | OXYGEN SATURATION: 100 %

## 2022-01-04 VITALS
DIASTOLIC BLOOD PRESSURE: 41 MMHG | OXYGEN SATURATION: 100 % | SYSTOLIC BLOOD PRESSURE: 90 MMHG | RESPIRATION RATE: 13 BRPM

## 2022-01-04 LAB — METER GLUCOSE: 82 MG/DL (ref 74–99)

## 2022-01-04 PROCEDURE — 7100000011 HC PHASE II RECOVERY - ADDTL 15 MIN: Performed by: STUDENT IN AN ORGANIZED HEALTH CARE EDUCATION/TRAINING PROGRAM

## 2022-01-04 PROCEDURE — 2580000003 HC RX 258: Performed by: ANESTHESIOLOGY

## 2022-01-04 PROCEDURE — 2709999900 HC NON-CHARGEABLE SUPPLY: Performed by: STUDENT IN AN ORGANIZED HEALTH CARE EDUCATION/TRAINING PROGRAM

## 2022-01-04 PROCEDURE — 88312 SPECIAL STAINS GROUP 1: CPT

## 2022-01-04 PROCEDURE — 3700000001 HC ADD 15 MINUTES (ANESTHESIA): Performed by: STUDENT IN AN ORGANIZED HEALTH CARE EDUCATION/TRAINING PROGRAM

## 2022-01-04 PROCEDURE — 7100000010 HC PHASE II RECOVERY - FIRST 15 MIN: Performed by: STUDENT IN AN ORGANIZED HEALTH CARE EDUCATION/TRAINING PROGRAM

## 2022-01-04 PROCEDURE — 6360000002 HC RX W HCPCS: Performed by: NURSE ANESTHETIST, CERTIFIED REGISTERED

## 2022-01-04 PROCEDURE — 3700000000 HC ANESTHESIA ATTENDED CARE: Performed by: STUDENT IN AN ORGANIZED HEALTH CARE EDUCATION/TRAINING PROGRAM

## 2022-01-04 PROCEDURE — 3609010300 HC COLONOSCOPY W/BIOPSY SINGLE/MULTIPLE: Performed by: STUDENT IN AN ORGANIZED HEALTH CARE EDUCATION/TRAINING PROGRAM

## 2022-01-04 PROCEDURE — 88305 TISSUE EXAM BY PATHOLOGIST: CPT

## 2022-01-04 PROCEDURE — 82962 GLUCOSE BLOOD TEST: CPT

## 2022-01-04 PROCEDURE — 45380 COLONOSCOPY AND BIOPSY: CPT | Performed by: STUDENT IN AN ORGANIZED HEALTH CARE EDUCATION/TRAINING PROGRAM

## 2022-01-04 RX ORDER — MESALAMINE 1.2 G/1
4800 TABLET, DELAYED RELEASE ORAL
Qty: 120 TABLET | Refills: 5 | Status: SHIPPED | OUTPATIENT
Start: 2022-01-04 | End: 2022-01-04 | Stop reason: SDUPTHER

## 2022-01-04 RX ORDER — FENTANYL CITRATE 50 UG/ML
INJECTION, SOLUTION INTRAMUSCULAR; INTRAVENOUS PRN
Status: DISCONTINUED | OUTPATIENT
Start: 2022-01-04 | End: 2022-01-04 | Stop reason: SDUPTHER

## 2022-01-04 RX ORDER — SODIUM CHLORIDE 0.9 % (FLUSH) 0.9 %
5-40 SYRINGE (ML) INJECTION PRN
Status: CANCELLED | OUTPATIENT
Start: 2022-01-04

## 2022-01-04 RX ORDER — SODIUM CHLORIDE 9 MG/ML
25 INJECTION, SOLUTION INTRAVENOUS PRN
Status: DISCONTINUED | OUTPATIENT
Start: 2022-01-04 | End: 2022-01-04 | Stop reason: HOSPADM

## 2022-01-04 RX ORDER — SODIUM CHLORIDE 0.9 % (FLUSH) 0.9 %
5-40 SYRINGE (ML) INJECTION PRN
Status: DISCONTINUED | OUTPATIENT
Start: 2022-01-04 | End: 2022-01-04 | Stop reason: HOSPADM

## 2022-01-04 RX ORDER — SODIUM CHLORIDE, SODIUM LACTATE, POTASSIUM CHLORIDE, CALCIUM CHLORIDE 600; 310; 30; 20 MG/100ML; MG/100ML; MG/100ML; MG/100ML
INJECTION, SOLUTION INTRAVENOUS CONTINUOUS
Status: DISCONTINUED | OUTPATIENT
Start: 2022-01-04 | End: 2022-01-04 | Stop reason: HOSPADM

## 2022-01-04 RX ORDER — MESALAMINE 1000 MG/1
1000 SUPPOSITORY RECTAL NIGHTLY
Qty: 60 SUPPOSITORY | Refills: 3 | Status: SHIPPED | OUTPATIENT
Start: 2022-01-04 | End: 2022-07-10 | Stop reason: SDUPTHER

## 2022-01-04 RX ORDER — MESALAMINE 1000 MG/1
1000 SUPPOSITORY RECTAL NIGHTLY
Qty: 60 SUPPOSITORY | Refills: 3 | Status: SHIPPED | OUTPATIENT
Start: 2022-01-04 | End: 2022-01-04 | Stop reason: SDUPTHER

## 2022-01-04 RX ORDER — MESALAMINE 1.2 G/1
4800 TABLET, DELAYED RELEASE ORAL
Qty: 120 TABLET | Refills: 5 | Status: SHIPPED | OUTPATIENT
Start: 2022-01-04 | End: 2022-07-10 | Stop reason: SDUPTHER

## 2022-01-04 RX ORDER — ONDANSETRON 2 MG/ML
4 INJECTION INTRAMUSCULAR; INTRAVENOUS EVERY 6 HOURS PRN
Status: CANCELLED | OUTPATIENT
Start: 2022-01-04

## 2022-01-04 RX ORDER — SODIUM CHLORIDE 0.9 % (FLUSH) 0.9 %
5-40 SYRINGE (ML) INJECTION EVERY 12 HOURS SCHEDULED
Status: CANCELLED | OUTPATIENT
Start: 2022-01-04

## 2022-01-04 RX ORDER — SODIUM CHLORIDE 0.9 % (FLUSH) 0.9 %
5-40 SYRINGE (ML) INJECTION EVERY 12 HOURS SCHEDULED
Status: DISCONTINUED | OUTPATIENT
Start: 2022-01-04 | End: 2022-01-04 | Stop reason: HOSPADM

## 2022-01-04 RX ORDER — PROPOFOL 10 MG/ML
INJECTION, EMULSION INTRAVENOUS PRN
Status: DISCONTINUED | OUTPATIENT
Start: 2022-01-04 | End: 2022-01-04 | Stop reason: SDUPTHER

## 2022-01-04 RX ORDER — SODIUM CHLORIDE 9 MG/ML
25 INJECTION, SOLUTION INTRAVENOUS PRN
Status: CANCELLED | OUTPATIENT
Start: 2022-01-04

## 2022-01-04 RX ORDER — ONDANSETRON 4 MG/1
4 TABLET, ORALLY DISINTEGRATING ORAL EVERY 8 HOURS PRN
Status: CANCELLED | OUTPATIENT
Start: 2022-01-04

## 2022-01-04 RX ADMIN — FENTANYL CITRATE 50 MCG: 50 INJECTION, SOLUTION INTRAMUSCULAR; INTRAVENOUS at 09:32

## 2022-01-04 RX ADMIN — FENTANYL CITRATE 50 MCG: 50 INJECTION, SOLUTION INTRAMUSCULAR; INTRAVENOUS at 09:35

## 2022-01-04 RX ADMIN — PHENYLEPHRINE HYDROCHLORIDE 200 MCG: 10 INJECTION INTRAVENOUS at 09:58

## 2022-01-04 RX ADMIN — PROPOFOL 40 MG: 10 INJECTION, EMULSION INTRAVENOUS at 09:40

## 2022-01-04 RX ADMIN — SODIUM CHLORIDE, POTASSIUM CHLORIDE, SODIUM LACTATE AND CALCIUM CHLORIDE: 600; 310; 30; 20 INJECTION, SOLUTION INTRAVENOUS at 08:52

## 2022-01-04 RX ADMIN — SODIUM CHLORIDE, POTASSIUM CHLORIDE, SODIUM LACTATE AND CALCIUM CHLORIDE: 600; 310; 30; 20 INJECTION, SOLUTION INTRAVENOUS at 10:03

## 2022-01-04 RX ADMIN — PROPOFOL 70 MG: 10 INJECTION, EMULSION INTRAVENOUS at 09:32

## 2022-01-04 RX ADMIN — PROPOFOL 150 MCG/KG/MIN: 10 INJECTION, EMULSION INTRAVENOUS at 09:33

## 2022-01-04 ASSESSMENT — PAIN - FUNCTIONAL ASSESSMENT: PAIN_FUNCTIONAL_ASSESSMENT: 0-10

## 2022-01-04 ASSESSMENT — PAIN DESCRIPTION - DESCRIPTORS: DESCRIPTORS: CRAMPING

## 2022-01-04 ASSESSMENT — PAIN SCALES - GENERAL: PAINLEVEL_OUTOF10: 0

## 2022-01-04 NOTE — PROGRESS NOTES
1/4/22 1130 per pt and his mother pt \"does not use [facility] for pharmacy. They use [facility] pharmacy. \" dr Janes Lindsey aware and then per [facility] pharmacist the suppository medication is here but the oral pill medication will not be available until tomorrow. \" pt and his mother aware and to  suppository medication today and then return tomorrow for oral medication.  Phone number for pharmacy given. kmo rn

## 2022-01-04 NOTE — ANESTHESIA POSTPROCEDURE EVALUATION
Department of Anesthesiology  Postprocedure Note    Patient: Concepcion Harris  MRN: [id number]  YOB: 2000  Date of evaluation: 1/4/2022  Time:  10:37 AM     Procedure Summary     Date: 01/04/22 Room / Location: [facility] 01 / [address] [facility]    Anesthesia Start: 5804 Anesthesia Stop: 3534    Procedure: COLONOSCOPY WITH BIOPSY (N/A ) Diagnosis: (INDETERMINATE COLITIS)    Surgeons: Ramana Pope MD Responsible Provider: Trang Bertrand MD    Anesthesia Type: MAC ASA Status: 2          Anesthesia Type: MAC    Juan Carlos Phase I: Juan Carlos Score: 10    Juan Carlos Phase II:      Last vitals: Reviewed and per EMR flowsheets.        Anesthesia Post Evaluation    Patient location during evaluation: bedside  Patient participation: complete - patient participated  Level of consciousness: awake  Pain score: 0  Nausea & Vomiting: no nausea and no vomiting  Complications: no  Cardiovascular status: hemodynamically stable  Respiratory status: acceptable  Hydration status: euvolemic

## 2022-01-04 NOTE — PROGRESS NOTES
1/4/22 1145 reviewed discharge instructions with pt and his mother.  Both verbalized understanding, signed in agreement and given copy. sara higuera

## 2022-01-04 NOTE — H&P
History and Physical      ASSESSMENT AND PLAN:    21y/M w/ history of indeterminate IBD colitis and persistent symptoms here for re-staging of disease. PLAN:  -Colonoscopy w/ biopsy today. HISTORY OF PRESENT ILLNESS:      Mr. David Cook is a 21y/M w/ history of indeterminate IBD colitis w/ persistent symptoms on steroid taper who presents for re-staging of disease. His previous colonoscopy showed involvement of the proximal transverse colon and rectum. He was admitted for persistent and worsening symptoms with improvement of symptoms on antibiotics and steroids. Symptoms have progressed again with wean of steroids. Past Medical History:        Diagnosis Date    Allergic     Allergic rhinitis     Hx of colonoscopy 9/27/2021    9/15/2021 colonoscopy Dr Frederick Luna probable Crohn's disease involving a 2 cm circumferential segment in the proximal transverse colon, mild in intension. Anorectal region from the anus at 20 cm with moderate granularity. Asacol HD 2 tablet TID, hydrocortisone suppositories in the evening for 2 weeks, small bowel follow through FU in 3 weeks     Past Surgical History:        Procedure Laterality Date    TONSILLECTOMY      WISDOM TOOTH EXTRACTION       Social History:    TOBACCO:   reports that he has never smoked. He has never used smokeless tobacco.  ETOH:   reports no history of alcohol use. DRUGS:   reports no history of drug use.   Family History:       Problem Relation Age of Onset    Other Mother         blood clots    No Known Problems Father     No Known Problems Sister     No Known Problems Brother          Current Facility-Administered Medications:     lactated ringers infusion, , IntraVENous, Continuous, Raheem Pitts MD, Last Rate: 100 mL/hr at 01/04/22 0852, New Bag at 01/04/22 1003    sodium chloride flush 0.9 % injection 5-40 mL, 5-40 mL, IntraVENous, PRN, Raheem Pitts MD    sodium chloride flush 0.9 % injection 5-40 mL, 5-40 mL, IntraVENous, 2 times per day, Radha Nicholas MD    sodium chloride flush 0.9 % injection 5-40 mL, 5-40 mL, IntraVENous, PRN, Radha Nicholas MD    0.9 % sodium chloride infusion, 25 mL, IntraVENous, PRN, Radha Nicholas MD    Facility-Administered Medications Ordered in Other Encounters:     fentaNYL (SUBLIMAZE) injection, , IntraVENous, PRN, ASTER Alegre CRNA, 50 mcg at 01/04/22 0935    propofol injection, , IntraVENous, PRN, ASTER Alegre - CRNA, 125 mcg/kg/min at 01/04/22 1002    phenylephrine (GERSON-SYNEPHRINE) injection, , IntraVENous, PRN, ASTER Alegre CRNA, 200 mcg at 01/04/22 4250     Allergies:  Cattle epithelium extract allergy skin test, Dog epithelium allergy skin test, Mixed ragweed, and Seasonal    PHYSICAL EXAM:  /60   Pulse 88   Temp 97.8 °F (36.6 °C) (Infrared)   Resp 18   Ht 5' 9\" (1.753 m)   Wt 142 lb (64.4 kg)   SpO2 100%   BMI 20.97 kg/m²   Physical Exam:  General: Overall well-appearing, NAD  HEENT: PERRLA, EOMI, Anicteric sclera, MMM, no rhinorrhea  Cards: RRR, no LE edema  Resp: Breathing comfortably on room air, good air movement, no use of accessory muscles, no audible wheezing  Abdomen: soft, NT, ND. Extremities: Moves all extremities, no effusions or bruising.   Skin: No rashes or jaundice  Neuro: A&O x 3, CN grossly intact, non-focal exam              Electronically signed by Radha Nicholas MD on 1/4/2022 at 10:11 AM

## 2022-01-04 NOTE — OP NOTE
Operative Note      Patient: Alex Vigil  YOB: 2000  MRN: [id number]    Date of Procedure: 1/4/2022    Pre-Op Diagnosis: INDETERMINATE COLITIS    Post-Op Diagnosis: Mild to moderate inflammation appreciated throughout entire examined colon. Procedure(s):  COLONOSCOPY WITH BIOPSY    Surgeon(s):  Ledy Jimenez MD    Assistant:   Surgical Assistant: Mcihael Selby RN    Anesthesia: Monitor Anesthesia Care    Estimated Blood Loss (mL): Minimal    Complications: None    Specimens:   ID Type Source Tests Collected by Time Destination   A : TERMINAL ILEUM BX (HISTORY OF IBD) Tissue Colon SURGICAL PATHOLOGY Ledy Jimenez MD 1/4/2022 7856    B : RIGHT COLON BX (HISTORY OF IBD) Tissue Colon SURGICAL PATHOLOGY Ledy Jimenez MD 1/4/2022 0959    C : TRANSVERSE COLON BX (HISTORY OF IBD) Tissue Colon SURGICAL PATHOLOGY Ledy Jimenez MD 1/4/2022 0763    D : LEFT COLON BX (HISTORY OF IBD) Tissue Colon SURGICAL PATHOLOGY Ledy Jimenez MD 1/4/2022 1004    E : RECTAL BX (HISTORY OF IBD) Specimen Rectum SURGICAL PATHOLOGY Ledy Jimenez MD 1/4/2022 1005        Implants:  * No implants in log *      Drains: * No LDAs found *    Indications:  21y/M w/ history of indeterminate IBD presents for endoscopic re-staging of disease. Findings:     Normal terminal ileum. Biopsied. Cecum with mild edema. Biopsied. Ascending colon, transverse colon, descending colon, and rectum with erythema, edema, decreased vascular markings, and moderate inflammation. No ulceration appreciated. Biopsies obtained. Biopsies taken from terminal ileum, right colon, transverse colon, left colon, and rectum. No hemorrhoids. Detailed Description of Procedure:   Pre-Anesthesia Assessment:  Prior to the procedure, a history and physical was performed and patient medications and allergies were reviewed. The risks, benefits, complications, treatment options and expected outcomes were discussed with the patient.   The possibilities of reaction to medication, pulmonary aspiration, perforation of the gastrointestinal tract, bleeding requiring transfusion or operation, respiratory failure requiring placement on a ventilator, and failure to diagnose a condition were discussed with the patient. All questions were answered and informed consent was obtained. Patient identification and proposed procedure were verified by the physician, the nurse, the anesthesiologist, the anesthetist, and the technician in the preprocedure area. Please see accompanying documentation. All staff in attendance for the performed procedure act in the role of the Chaperone. After I obtained informed consent, the scope was passed under direct vision. Throughout the procedure, the patient's blood pressure, pulse, and oxygen saturations were monitored continuously. The colonoscope was introduced through the anus and advanced to the terminal ileum. The procedure was performed without difficulty. The patient tolerated the procedure well. Colonoscopy:  Anticoagulants: None    Primary Family Member/s with Colon Cancer: None    The quality of the bowel preparation was adequate. The terminal ileum, the ileocecal valve, the appendiceal orifice, and the rectum via retroflex were photographed. Findings: On Perianal exam, no external hemorrhoids or michael-anal disease was appreciated. The mucosa of the terminal ileum appeared normal. No inflammation or ulceration was appreciated. The mucosa of the cecum was mildly edematous. The vascular markings were noted to be normal and no erythema was noted. Biopsies were obtained. The ascending colon, transverse colon, descending colon, sigmoid colon, and rectum were moderately inflamed with erythema, edema, and decreased vascular markings. No ulceration or sites of active bleeding were appreciated. Biopsies were obtained throughout and labeled separately. No internal hemorrhoids were appreciated.  No polyps were appreciated. Recommendations:  -The patient will be observed post procedure until all discharge criteria are met. -Patient has a contact number available for emergencies. The signs and symptoms of potential delayed complications were discussed with the patient.    -Return to normal activities tomorrow. -Written discharge instructions were provided to the patient.    -Await pathology results.  -Begin Lialda 4.8g daily.   -Timeframe until next colonoscopy will be discussed in clinic and based on Clinical Guidelines regarding size, number, and pathology of polyps removed as well as adequacy of bowel prep.   -Clinic appointment scheduled for 1/17/22. EMR to be updated based on findings and discussion.            Electronically signed by Florencio Munson MD on 1/4/2022 at 10:11 AM

## 2022-01-05 NOTE — PROGRESS NOTES
CLINICAL PHARMACY NOTE: MEDS TO BEDS    Total # of Prescriptions Filled: 2   The following medications were delivered to the patient:  · Mesalamine 1000mg supp  · Mesalamine DR 1.2gm    Additional Documentation:

## 2022-01-27 ENCOUNTER — TELEPHONE (OUTPATIENT)
Dept: FAMILY MEDICINE CLINIC | Age: 22
End: 2022-01-27

## 2022-01-27 ENCOUNTER — OFFICE VISIT (OUTPATIENT)
Dept: FAMILY MEDICINE CLINIC | Age: 22
End: 2022-01-27
Payer: COMMERCIAL

## 2022-01-27 VITALS
BODY MASS INDEX: 21.03 KG/M2 | DIASTOLIC BLOOD PRESSURE: 68 MMHG | TEMPERATURE: 99.8 F | WEIGHT: 142 LBS | OXYGEN SATURATION: 100 % | HEIGHT: 69 IN | SYSTOLIC BLOOD PRESSURE: 116 MMHG | RESPIRATION RATE: 17 BRPM | HEART RATE: 84 BPM

## 2022-01-27 DIAGNOSIS — U07.1 COVID-19: Primary | ICD-10-CM

## 2022-01-27 DIAGNOSIS — R52 BODY ACHES: ICD-10-CM

## 2022-01-27 DIAGNOSIS — J02.9 SORE THROAT: ICD-10-CM

## 2022-01-27 DIAGNOSIS — J02.0 ACUTE STREPTOCOCCAL PHARYNGITIS: ICD-10-CM

## 2022-01-27 LAB
Lab: ABNORMAL
QC PASS/FAIL: ABNORMAL
S PYO AG THROAT QL: POSITIVE
SARS-COV-2 RDRP RESP QL NAA+PROBE: POSITIVE

## 2022-01-27 PROCEDURE — 87880 STREP A ASSAY W/OPTIC: CPT | Performed by: NURSE PRACTITIONER

## 2022-01-27 PROCEDURE — 87635 SARS-COV-2 COVID-19 AMP PRB: CPT | Performed by: NURSE PRACTITIONER

## 2022-01-27 PROCEDURE — 99214 OFFICE O/P EST MOD 30 MIN: CPT | Performed by: NURSE PRACTITIONER

## 2022-01-27 RX ORDER — AMOXICILLIN 500 MG/1
1 TABLET, FILM COATED ORAL 2 TIMES DAILY
Qty: 20 TABLET | Refills: 0 | Status: SHIPPED | OUTPATIENT
Start: 2022-01-27 | End: 2022-02-06

## 2022-01-27 NOTE — PROGRESS NOTES
22  Velvet Rodrigez : 2000 Sex: male  Age 19 y.o. Subjective:  Chief Complaint   Patient presents with    Fever     body aches, sore throat , stomsach pain, calf pain , sneezing since tuesday        HPI:   Velvet Rodrigez , 19 y.o. male presents to the clinic for evaluation of sore throat x 2 days. The patient also reports body aches, left calf tenderness, diminished taste, dry cough, and nausea. The patient has taken throat spray for symptoms. The patient reports unchanged symptoms over time. The patient denies ill exposure. The patient denies hx of COVID-19 and reports having the vaccines. The patient denies acute loss of taste and smell, headache, sinus congestion, rash, and fever. The patient also denies chest pain, abdominal pain, and vomiting / diarrhea. ROS:   Unless otherwise stated in this report the patient's positive and negative responses for review of systems for constitutional, eyes, ENT, cardiovascular, respiratory, gastrointestinal, neurological, , musculoskeletal, and integument systems and related systems to the presenting problem are either stated in the history of present illness or were not pertinent or were negative for the symptoms and/or complaints related to the presenting medical problem. Positives and pertinent negatives as per HPI. All others reviewed and are negative. PMH:     Past Medical History:   Diagnosis Date    Allergic     Allergic rhinitis     Hx of colonoscopy 2021    9/15/2021 colonoscopy Dr Sudhakar Lezama probable Crohn's disease involving a 2 cm circumferential segment in the proximal transverse colon, mild in intension. Anorectal region from the anus at 20 cm with moderate granularity.  Asacol HD 2 tablet TID, hydrocortisone suppositories in the evening for 2 weeks, small bowel follow through FU in 3 weeks    IBS (irritable bowel syndrome)        Past Surgical History:   Procedure Laterality Date    COLONOSCOPY N/A 2022    COLONOSCOPY WITH BIOPSY performed by Mckinley Cheney MD at [address] EXTRACTION         Family History   Problem Relation Age of Onset    Other Mother         blood clots    No Known Problems Father     No Known Problems Sister     No Known Problems Brother        Medications:     Current Outpatient Medications:     Amoxicillin 500 MG TABS, Take 1 tablet by mouth 2 times daily for 10 days, Disp: 20 tablet, Rfl: 0    mesalamine (CANASA) 1000 MG suppository, Place 1 suppository rectally nightly, Disp: 60 suppository, Rfl: 3    mesalamine (LIALDA) 1.2 g EC tablet, Take 4 tablets by mouth daily (with breakfast), Disp: 120 tablet, Rfl: 5    dicyclomine (BENTYL) 20 MG tablet, Take 1 tablet by mouth 4 times daily as needed (abdominal pain, bloating), Disp: 60 tablet, Rfl: 5    famotidine (PEPCID) 40 MG tablet, Take 0.5 tablets by mouth 2 times daily, Disp: 30 tablet, Rfl: 3    mesalamine (ROWASA) 4 g enema, Place 4 g rectally nightly, Disp: , Rfl:     PREDNISONE PO, Take 15 mg by mouth daily Decreasing dose 10 mg qd starting 1/4/2022 (Patient not taking: Reported on 1/27/2022), Disp: , Rfl:     Allergies: Allergies   Allergen Reactions    Cattle Epithelium Extract Allergy Skin Test     Dog Epithelium Allergy Skin Test     Mixed Ragweed     Seasonal        Social History:     Social History     Tobacco Use    Smoking status: Never Smoker    Smokeless tobacco: Never Used   Vaping Use    Vaping Use: Never used   Substance Use Topics    Alcohol use: Never    Drug use: Never       Patient lives at home. Physical Exam:     Vitals:    01/27/22 0951   BP: 116/68   Pulse: 84   Resp: 17   Temp: 99.8 °F (37.7 °C)   TempSrc: Oral   SpO2: 100%   Weight: 142 lb (64.4 kg)   Height: 5' 9\" (1.753 m)       Physical Exam (PE)    Physical Exam  Constitutional:       Appearance: Normal appearance. HENT:      Head: Normocephalic.       Right Ear: Tympanic membrane, ear canal and external ear normal.      Left Ear: Tympanic membrane, ear canal and external ear normal.      Nose: Congestion and rhinorrhea present. Mouth/Throat:      Mouth: Mucous membranes are moist.      Pharynx: Oropharynx is clear. Posterior oropharyngeal erythema present. No oropharyngeal exudate. Eyes:      Pupils: Pupils are equal, round, and reactive to light. Cardiovascular:      Rate and Rhythm: Normal rate and regular rhythm. Pulses: Normal pulses. Dorsalis pedis pulses are 2+ on the right side and 2+ on the left side. Heart sounds: Normal heart sounds. Pulmonary:      Effort: Pulmonary effort is normal.      Breath sounds: Normal breath sounds. No wheezing, rhonchi or rales. Abdominal:      General: Bowel sounds are normal.      Palpations: Abdomen is soft. Musculoskeletal:         General: Normal range of motion. Cervical back: Normal range of motion and neck supple. Right lower leg: No edema. Left lower leg: No edema. Comments: Bilateral calves, negative Homans' sign. Lymphadenopathy:      Cervical: No cervical adenopathy. Skin:     General: Skin is warm and dry. Capillary Refill: Capillary refill takes less than 2 seconds. Comments: Bilateral calves: no warmth, edema, or erythema. Neurological:      General: No focal deficit present. Mental Status: He is alert and oriented to person, place, and time. Psychiatric:         Mood and Affect: Mood normal.         Behavior: Behavior normal.          Testing:   (All laboratory and radiology results have been personally reviewed by myself)  Labs:  Results for orders placed or performed in visit on 01/27/22   POCT COVID-19 Rapid, NAAT   Result Value Ref Range    SARS-COV-2, RdRp gene Positive (A) Negative    Lot Number [id number]     QC Pass/Fail PASS    POCT rapid strep A   Result Value Ref Range    Strep A Ag Positive (A) None Detected       Imaging:   All Radiology results interpreted by Radiologist unless otherwise noted. No orders to display     Wells' Score Criteria for DVT: (possible score ? 2 to 9)      Active cancer (treatment within last 6 months or palliative) no (0)     Calf swelling ? 3 cm compared to asymptomatic calf (measured 10 cm             below tibial tuberosity) no (0)     Swollen unilateral superficial veins (non-varicose, in symptomatic leg) no (0)     Unilateral pitting edema (in symptomatic leg): no (0)     Previous documented DVT: no (0)     Swelling of entire leg no (0)      Localized tenderness along the deep venous system no (0)     Paralysis, paresis, or recent cast immobilization of lower extremities no (0)     Recently bedridden ? 3 days, or major surgery requiring regional or                    general anesthetic in the past 12 weeks no (0)     Alternative diagnosis at least as likely no (0)     TOTAL SCORE 0     * Those with Wells scores of two or more have a 28% chance of having DVT, those with    a lower score have 6% odds. ** Alternatively, Wells scores can be categorized as high if greater than two, moderate if      one or two, and low if less than one, with likelihoods of 53%, 17%, and 5%     respectively. Assessment / Plan:   The patient's vitals, allergies, medications, and past medical history have been reviewed. Elizabeth Hathaway was seen today for fever. Diagnoses and all orders for this visit:    COVID-19    Acute streptococcal pharyngitis  -     Amoxicillin 500 MG TABS; Take 1 tablet by mouth 2 times daily for 10 days    Sore throat  -     POCT COVID-19 Rapid, NAAT  -     POCT rapid strep A    Body aches  -     POCT COVID-19 Rapid, NAAT  -     POCT rapid strep A        - Disposition: Home    - Educational material printed for patient's review and were included in patient instructions. After Visit Summary and given to patient at the end of visit. - Advised to follow CDC guidelines. Patient was offered and declined ultrasound of left lower extremity today.   Encouraged oral fluids and rest. Discussed symptomatic treatments with patient today including Tylenol prn for fever / pain. Schedule a follow-up with PCP in 2-3 days. Red flag symptoms were discussed with the patient today. The patient is directed to go the ED if symptoms change or worsen. Pt verbalizes understanding and is in agreement with plan of care. All questions answered. SIGNATURE: ASTER Perry-CNP    *NOTE: This report was transcribed using voice recognition software. Every effort was made to ensure accuracy; however, inadvertent computerized transcription errors may be present.

## 2022-01-27 NOTE — LETTER
[address] [place] [address]  Phone: [phone]  Fax: [phone]    ASTER Mahan CNP        January 27, 2022      Patient: Guerita Villasenor   YOB: 2000     To Whom It May Concern: It is my medical opinion the patient should quarantine from work / school for positive COVID-19 test result. A positive COVID-19 test, needs a minimum of 5 day strict quarantine based on symptoms. The patient should quarantine through 1/30/22. If symptoms are improving or have resolved after 5 days, the patient can discontinue quarantine. However, the patient must continue to wear a mask around others for 5 additional days. If fever continues or symptoms do not improve the patient is to the quarantine for an additional 5 days. The patient is also required to be fever free for 24 hours without fever reducing medications to end quarantine. The patient does not require retesting to return to work/school. If you have any questions or concerns, please don't hesitate to call.       Sincerely,        ASTER Mahan CNP

## 2022-01-27 NOTE — PATIENT INSTRUCTIONS
Patient Education        Learning About Coronavirus [phone]  What is coronavirus (COVID-19)? COVID-19 is a disease caused by a type of coronavirus. This illness was first found in December 2019. It has since spread worldwide. Coronaviruses are a large group of viruses. They cause the common cold. They also cause more serious illnesses like [place] respiratory syndrome (MERS) and severe acute respiratory syndrome (SARS). COVID-19 is caused by a novel coronavirus. That means it's a new type that has not been seen in people before. What are the symptoms? COVID-19 symptoms may include:  · Fever. · Cough. · Trouble breathing. · Chills or repeated shaking with chills. · Muscle and body aches. · Headache. · Sore throat. · New loss of taste or smell. · Vomiting. · Diarrhea. In severe cases, COVID-19 can cause pneumonia and make it hard to breathe without help from a machine. It can cause death. How is it diagnosed? COVID-19 is diagnosed with a viral test. This may also be called a PCR test or antigen test. It looks for evidence of the virus in your breathing passages or lungs (respiratory system). The test is most often done on a sample from the nose, throat, or lungs. It's sometimes done on a sample of saliva. One way a sample is collected is by putting a long swab into the back of your nose. How is it treated? Mild cases of COVID-19 can be treated at home. Serious cases need treatment in the hospital. Treatment may include medicines to reduce symptoms, plus breathing support such as oxygen therapy or a ventilator. Some people may be placed on their belly to help their oxygen levels. Treatments that may help people who have COVID-19 include:  Antiviral medicines. These medicines treat viral infections. Remdesivir is an example. Immune-based therapy. These medicines help the immune system fight COVID-19. Examples include monoclonal antibodies. Blood thinners.   These medicines help prevent blood clots. People with severe illness are at risk for blood clots. How can you protect yourself and others? · Get vaccinated. · Avoid sick people. · Cover your mouth with a tissue when you cough or sneeze. · Wash your hands often, especially after you cough or sneeze. Use soap and water, and scrub for at least 20 seconds. If soap and water aren't available, use an alcohol-based hand . · Avoid touching your mouth, nose, and eyes. Be sure to follow all instructions from the [facility] and your local health authorities. Here are some examples of specific precautions you may need to take. · If you are not fully vaccinated:  ? Wear a mask if you have to go to public areas. ? Avoid crowds and try to stay at least 6 feet away from other people. · If you have been exposed to the virus and are not fully vaccinated:  ? Stay home. Don't go to school, work, or public areas. And don't use public transportation, ride-shares, or taxis unless you have no choice. ? Wear a mask if you have to go to public areas, like the pharmacy. · Even if you're fully vaccinated, there's still a small chance you can get and spread COVID-19. If you live in an area where COVID-19 is spreading quickly, wear a mask if you have to go to indoor public areas. You might also want to wear a mask in crowded outdoor areas if you:  ? Have certain health conditions. ? Live with someone who has a compromised immune system. ? Live with someone who is not fully vaccinated. · If you have been exposed and you are fully vaccinated:  ? Talk to your doctor. You may need a COVID-19 test.  ? Wear a mask in public indoor spaces for 14 days or until you test negative for COVID-19. If you're sick:  · Leave your home only if you need to get medical care. But call the doctor's office first so they know you're coming. And wear a mask. · Wear a mask whenever you're around other people. · Limit contact with pets and people in your home.  If possible, stay in a separate bedroom and use a separate bathroom. · Clean and disinfect your home every day. Use household  and disinfectant wipes or sprays. Take special care to clean things that you touch with your hands. How can you self-isolate when you have COVID-19? If you have COVID-19, there are things you can do to help avoid spreading the virus to others. · Limit contact with people in your home. If possible, stay in a separate bedroom and use a separate bathroom. · Wear a mask when you are around other people. · If you have to leave home, avoid crowds and try to stay at least 6 feet away from other people. · Avoid contact with pets and other animals. · Cover your mouth and nose with a tissue when you cough or sneeze. Then throw it in the trash right away. · Wash your hands often, especially after you cough or sneeze. Use soap and water, and scrub for at least 20 seconds. If soap and water aren't available, use an alcohol-based hand . · Don't share personal household items. These include bedding, towels, cups and glasses, and eating utensils. · [address] in the warmest water allowed for the fabric type, and dry it completely. It's okay to wash other people's laundry with yours. · Clean and disinfect your home. Use household  and disinfectant wipes or sprays. When should you call for help? Call 911 anytime you think you may need emergency care. For example, call if you have life-threatening symptoms, such as:    · You have severe trouble breathing. (You can't talk at all.)     · You have constant chest pain or pressure.     · You are severely dizzy or lightheaded.     · You are confused or can't think clearly.     · You have pale, gray, or blue-colored skin or lips.     · You pass out (lose consciousness) or are very hard to wake up. Call your doctor now or seek immediate medical care if:    · You have moderate trouble breathing.  (You can't speak a full sentence.)     · You are coughing up blood (more than about 1 teaspoon).     · You have signs of low blood pressure. These include feeling lightheaded; being too weak to stand; and having cold, pale, clammy skin. Watch closely for changes in your health, and be sure to contact your doctor if:    · Your symptoms get worse.     · You are not getting better as expected.     · You have new or worse symptoms of anxiety, depression, nightmares, or flashbacks. Call before you go to the doctor's office. Follow their instructions. And wear a mask. Current as of: July 1, 2021               Content Version: 13.1  © 2006-2021 [employer]. Care instructions adapted under license by [facility] ([facility]). If you have questions about a medical condition or this instruction, always ask your healthcare professional. [address] any warranty or liability for your use of this information. Patient Education        Strep Throat: Care Instructions  Your Care Instructions     Strep throat is a bacterial infection that causes sudden, severe sore throat and fever. Strep throat, which is caused by bacteria called streptococcus, is treated with antibiotics. Sometimes a strep test is necessary to tell if the sore throat is caused by strep bacteria. Treatment can help ease symptoms and may prevent future problems. Follow-up care is a key part of your treatment and safety. Be sure to make and go to all appointments, and call your doctor if you are having problems. It's also a good idea to know your test results and keep a list of the medicines you take. How can you care for yourself at home? · Take your antibiotics as directed. Do not stop taking them just because you feel better. You need to take the full course of antibiotics. · Strep throat can spread to others until 24 hours after you begin taking antibiotics. During this time, avoid contact with other people at work, school, or home, especially infants and children.  Do not sneeze or cough on others, and wash your hands often. Keep your drinking glass and eating utensils separate from those of others. Wash these items well in hot, soapy water. · Gargle with warm salt water at least once each hour to help reduce swelling and make your throat feel better. Use 1 teaspoon of salt mixed in 8 fluid ounces of warm water. · Take an over-the-counter pain medication, such as acetaminophen (Tylenol), ibuprofen (Advil, Motrin), or naproxen (Aleve). Read and follow all instructions on the label. · Try an over-the-counter anesthetic throat spray or throat lozenges, which may help relieve throat pain. · Drink plenty of fluids. Fluids may help soothe an irritated throat. Hot fluids, such as tea or soup, may help your throat feel better. · Eat soft solids and drink plenty of clear liquids. Flavored ice pops, ice cream, scrambled eggs, sherbet, and gelatin dessert (such as Jell-O) may also soothe the throat. · Get lots of rest.  · Do not smoke, and avoid secondhand smoke. If you need help quitting, talk to your doctor about stop-smoking programs and medicines. These can increase your chances of quitting for good. · Use a vaporizer or humidifier to add moisture to the air in your bedroom. Follow the directions for cleaning the machine. When should you call for help? Call your doctor now or seek immediate medical care if:    · You have a new or higher fever.     · You have a fever with a stiff neck or severe headache.     · You have new or worse trouble swallowing.     · Your sore throat gets much worse on one side.     · Your pain becomes much worse on one side of your throat. Watch closely for changes in your health, and be sure to contact your doctor if:    · You are not getting better after 2 days (48 hours).     · You do not get better as expected. Where can you learn more? Go to [email or web address].[email or web address] org and sign in to your [employer] account.  Enter K625 in the Search Health Information box to learn more about \"Strep Throat: Care Instructions. \"     If you do not have an account, please click on the \"Sign Up Now\" link. Current as of: September 8, 2021               Content Version: 13.1  © 8814-3897 [employer]. Care instructions adapted under license by [facility]). If you have questions about a medical condition or this instruction, always ask your healthcare professional. [address] any warranty or liability for your use of this information.

## 2022-01-27 NOTE — TELEPHONE ENCOUNTER
----- Message from Kenyajan Gabriel sent at 1/27/2022  8:47 AM EST -----  Subject: Appointment Request    Reason for Call: Semi-Routine Cough, Cold Symptoms    QUESTIONS  Type of Appointment? Established Patient  Reason for appointment request? No appointments available during search  Additional Information for Provider? Pt has shortness of breathe for the   last couple of weeks, sore throat and left calf pain. No appts available   within the next couple of days. Please reach out if something becomes   available.  ---------------------------------------------------------------------------  --------------  CALL BACK INFO  What is the best way for the office to contact you? OK to leave message on   voicemail  Preferred Call Back Phone Number? [phone]  ---------------------------------------------------------------------------  --------------  SCRIPT ANSWERS  Relationship to Patient? Self  Are you currently unable to finish sentences due to any difficulty   breathing? No  Are you unable to swallow liquids? No  Are you having fevers (100.4 or greater), chills, or sweats? No  Do you have COPD, asthma or a chronic lung condition? No  Have your symptoms been present for more than 5 days? No  Have you recently (14 days) been seen by a provider for this issue? No  Have you been diagnosed with, awaiting test results for, or told that you   are suspected of having COVID-19 (Coronavirus)? (If patient has tested   negative or was tested as a requirement for work, school, or travel and   not based on symptoms, answer no)? No  Within the past two weeks have you developed any of the following symptoms   (answer no if symptoms have been present longer than 2 weeks or began   more than 2 weeks ago)?  Fever or Chills, Cough, Shortness of breath or   difficulty breathing, Loss of taste or smell, Sore throat, Nasal   congestion, Sneezing or runny nose, Fatigue or generalized body aches   (answer no if pain is specific to a body part

## 2022-03-03 DIAGNOSIS — K52.9 COLITIS: Primary | ICD-10-CM

## 2022-03-03 RX ORDER — FAMOTIDINE 40 MG/1
20 TABLET, FILM COATED ORAL 2 TIMES DAILY
Qty: 30 TABLET | Refills: 3 | Status: SHIPPED
Start: 2022-03-03 | End: 2022-07-10 | Stop reason: SDUPTHER

## 2022-03-21 ENCOUNTER — OFFICE VISIT (OUTPATIENT)
Dept: GASTROENTEROLOGY | Age: 22
End: 2022-03-21
Payer: COMMERCIAL

## 2022-03-21 VITALS
BODY MASS INDEX: 21.03 KG/M2 | HEIGHT: 69 IN | RESPIRATION RATE: 18 BRPM | OXYGEN SATURATION: 99 % | HEART RATE: 62 BPM | SYSTOLIC BLOOD PRESSURE: 118 MMHG | DIASTOLIC BLOOD PRESSURE: 62 MMHG | WEIGHT: 142 LBS | TEMPERATURE: 97.3 F

## 2022-03-21 DIAGNOSIS — K52.9 COLITIS: ICD-10-CM

## 2022-03-21 DIAGNOSIS — K50.111 CROHN'S DISEASE OF COLON WITH RECTAL BLEEDING (HCC): Primary | ICD-10-CM

## 2022-03-21 PROCEDURE — 99213 OFFICE O/P EST LOW 20 MIN: CPT | Performed by: STUDENT IN AN ORGANIZED HEALTH CARE EDUCATION/TRAINING PROGRAM

## 2022-03-21 RX ORDER — POLYETHYLENE GLYCOL 3350, SODIUM CHLORIDE, SODIUM BICARBONATE, POTASSIUM CHLORIDE 420; 11.2; 5.72; 1.48 G/4L; G/4L; G/4L; G/4L
4000 POWDER, FOR SOLUTION ORAL ONCE
Qty: 4000 ML | Refills: 0 | Status: SHIPPED | OUTPATIENT
Start: 2022-03-21 | End: 2022-03-21

## 2022-03-21 RX ORDER — MELATONIN
1000 DAILY
Qty: 90 TABLET | Refills: 5 | Status: SHIPPED
Start: 2022-03-21 | End: 2022-09-30 | Stop reason: ALTCHOICE

## 2022-03-21 NOTE — PROGRESS NOTES
Gastroenterology Outpatient Progress Note    SUBJECTIVE:      Mr. 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 He originally underwent colonoscopy in September with findings showing inflammation of the proximal transverse colon and rectum. He had persistent symptoms and was placed on a prolonged steroid taper. He underwent repeat colonoscopy on 1/4/22 which found:    Findings:    Normal terminal ileum. Biopsied. Cecum with mild edema. Biopsied. Ascending colon, transverse colon, descending colon, and rectum with erythema, edema, decreased vascular markings, and moderate inflammation. No ulceration appreciated. Biopsies obtained.     Pathology:   Terminal Ileum: Normal  R colon: Chronic colitis with focal mild activity  Transverse Colon: Chronic colitis with focal activity and erosion. L Colon: Chronic colitis with focal activity and erosion. Rectum: Chronic active colitis with focal non-necrotizing granulomas. Labs Dec 21:  CRP: 0.7  ESR: 42  Vit D: 12  Vit B12: 431  Fe/TIBC: 23 / 235  TPMT: Normal    He is currently on PO and MT mesalamine and reports and overall improvement in symptoms. He is having mild dyspepsia at times but otherwise has been doing well. He denies extraintestinal manifestations of disease. OBJECTIVE    Physical    VITALS:  /62 (Site: Right Upper Arm, Position: Sitting, Cuff Size: Medium Adult)   Pulse 62   Temp 97.3 °F (36.3 °C) (Infrared)   Resp 18   Ht 5' 9\" (1.753 m)   Wt 142 lb (64.4 kg)   SpO2 99%   BMI 20.97 kg/m²   Physical Exam:  General: Overall well-appearing, NAD  HEENT: PERRLA, EOMI, Anicteric sclera, MMM, no rhinorrhea  Cards: RRR, no LE edema  Resp: Breathing comfortably on room air, good air movement, no use of accessory muscles, no audible wheezing  Abdomen: soft, NT, ND. Extremities: Moves all extremities, no effusions or bruising.   Skin: No rashes or jaundice  Neuro: A&O x 3, CN grossly intact, non-focal exam    ASSESSMENT AND PLAN      21y/M w/ history of indeterminate IBD colitis though favor Crohn's colitis given the presence of granulomas on pathology who presents in follow-up. He is doing well on mesalamine therapy and reports an improvement overall in symptoms. PLAN:  -Continue PO and OH mesalamine therapy for now.   -I will schedule the patient for re-staging exam with colonoscopy in June to ensure mucosal/pathologic response. -CBC, CMP, ESR, CRP q 6 months  -I will begin the patient on Vit D supplementation  -I will start the patient on PRN famotidine for dyspepsia. If taking daily, will increase to PPI daily. I will see the patient in clinic following his colonoscopy. Thank you for including us in the care of this patient. Please do not hesitate to contact us with any additional questions or concerns.     Roxanne Patel MD  Gastroenterology/Hepatology  Advanced Endoscopy

## 2022-06-13 NOTE — PROGRESS NOTES
[address] [facility]                                                                                                                    PRE OP INSTRUCTIONS FOR  Angeline Party        Date: 6/13/2022    Date of surgery: 6/14/22   Arrival Time: Hospital will call you between 5pm and 7pm with your final arrival time for surgery    1. Nothing by mouth (NPO) as instructed.____________________________________________________________________    2. Take the following medications with a small sip of water on the morning of Surgery:     3. Diabetics may take evening dose of insulin but none after midnight. If you feel symptomatic or low blood sugar morning of surgery drink 1-2 ounces of apple juice only. 4. Aspirin, Ibuprofen, Advil, Naproxen, Vitamin E and other Anti-inflammatory products should be stopped  before surgery  as directed by your physician. Take Tylenol only unless instructed otherwise by your surgeon. 5. Check with your Doctor regarding stopping Plavix, Coumadin, Lovenox, Eliquis, Effient, or other blood thinners. 6. Do not smoke,use illicit drugs and do not drink any alcoholic beverages 24 hours prior to surgery. 7. You may brush your teeth the morning of surgery. DO NOT SWALLOW WATER    8. You MUST make arrangements for a responsible adult to take you home after your surgery. You will not be allowed to leave alone or drive yourself home. It is strongly suggested someone stay with you the first 24 hrs. Your surgery will be cancelled if you do not have a ride home. 9. PEDIATRIC PATIENTS ONLY:  A parent/legal guardian must accompany a child scheduled for surgery and plan to stay at the hospital until the child is discharged. Please do not bring other children with you.     10. Please wear simple, loose fitting clothing to the hospital.  Pk Mondragonmaria elena not bring valuables (money, credit cards, checkbooks, etc.) Do not wear any makeup (including no eye makeup) or nail polish on your fingers or toes. 11. DO NOT wear any jewelry or piercings on day of surgery. All body piercing jewelry must be removed. 12. Shower the night before surgery with _x__Antibacterial soap /LEVI WIPES________    13. TOTAL JOINT REPLACEMENT/HYSTERECTOMY PATIENTS ONLY---Remember to bring Blood Bank bracelet to the hospital on the day of surgery. 14. If you have a Living Will and Durable Power of  for Healthcare, please bring in a copy. 15. If appropriate bring crutches, inspirex, WALKER, CANE etc... 12. Notify your Surgeon if you develop any illness between now and surgery time, cough, cold, fever, sore throat, nausea, vomiting, etc.  Please notify your surgeon if you experience dizziness, shortness of breath or blurred vision between now & the time of your surgery. 17. If you have ___dentures, they will be removed before going to the OR; we will provide you a container. If you wear ___contact lenses or __x_glasses, they will be removed; please bring a case for them. 18. To provide excellent care visitors will be limited to 2 in the room at any given time. 19. Please bring picture ID and insurance card. 20. During flu season no children under the age of 15 are permitted in the hospital for the safety of all patients. 21. Other               Please call AMBULATORY CARE if you have any further questions.    [address]     [address]

## 2022-06-14 ENCOUNTER — HOSPITAL ENCOUNTER (OUTPATIENT)
Age: 22
Setting detail: OUTPATIENT SURGERY
Discharge: HOME | End: 2022-06-14
Attending: STUDENT IN AN ORGANIZED HEALTH CARE EDUCATION/TRAINING PROGRAM | Admitting: STUDENT IN AN ORGANIZED HEALTH CARE EDUCATION/TRAINING PROGRAM
Payer: COMMERCIAL

## 2022-06-14 ENCOUNTER — ANESTHESIA (OUTPATIENT)
Dept: ENDOSCOPY | Age: 22
End: 2022-06-14
Payer: COMMERCIAL

## 2022-06-14 ENCOUNTER — ANESTHESIA EVENT (OUTPATIENT)
Dept: ENDOSCOPY | Age: 22
End: 2022-06-14
Payer: COMMERCIAL

## 2022-06-14 VITALS
DIASTOLIC BLOOD PRESSURE: 58 MMHG | OXYGEN SATURATION: 100 % | RESPIRATION RATE: 20 BRPM | HEIGHT: 68 IN | TEMPERATURE: 97 F | BODY MASS INDEX: 22.47 KG/M2 | WEIGHT: 148.25 LBS | SYSTOLIC BLOOD PRESSURE: 100 MMHG | HEART RATE: 64 BPM

## 2022-06-14 DIAGNOSIS — K50.111 CROHN'S DISEASE OF COLON WITH RECTAL BLEEDING (HCC): ICD-10-CM

## 2022-06-14 PROCEDURE — 88305 TISSUE EXAM BY PATHOLOGIST: CPT | Performed by: ANESTHESIOLOGY

## 2022-06-14 PROCEDURE — 45380 COLONOSCOPY AND BIOPSY: CPT | Performed by: STUDENT IN AN ORGANIZED HEALTH CARE EDUCATION/TRAINING PROGRAM

## 2022-06-14 PROCEDURE — 2709999900 HC NON-CHARGEABLE SUPPLY: Performed by: STUDENT IN AN ORGANIZED HEALTH CARE EDUCATION/TRAINING PROGRAM

## 2022-06-14 PROCEDURE — 7100000011 HC PHASE II RECOVERY - ADDTL 15 MIN: Performed by: STUDENT IN AN ORGANIZED HEALTH CARE EDUCATION/TRAINING PROGRAM

## 2022-06-14 PROCEDURE — 7100000010 HC PHASE II RECOVERY - FIRST 15 MIN: Performed by: STUDENT IN AN ORGANIZED HEALTH CARE EDUCATION/TRAINING PROGRAM

## 2022-06-14 PROCEDURE — 3700000000 HC ANESTHESIA ATTENDED CARE: Performed by: STUDENT IN AN ORGANIZED HEALTH CARE EDUCATION/TRAINING PROGRAM

## 2022-06-14 PROCEDURE — 6360000002 HC RX W HCPCS: Performed by: NURSE ANESTHETIST, CERTIFIED REGISTERED

## 2022-06-14 PROCEDURE — 2580000003 HC RX 258: Performed by: NURSE ANESTHETIST, CERTIFIED REGISTERED

## 2022-06-14 PROCEDURE — 3700000001 HC ADD 15 MINUTES (ANESTHESIA): Performed by: STUDENT IN AN ORGANIZED HEALTH CARE EDUCATION/TRAINING PROGRAM

## 2022-06-14 PROCEDURE — 2500000003 HC RX 250 WO HCPCS: Performed by: NURSE ANESTHETIST, CERTIFIED REGISTERED

## 2022-06-14 PROCEDURE — 88305 TISSUE EXAM BY PATHOLOGIST: CPT

## 2022-06-14 PROCEDURE — 3609010300 HC COLONOSCOPY W/BIOPSY SINGLE/MULTIPLE: Performed by: STUDENT IN AN ORGANIZED HEALTH CARE EDUCATION/TRAINING PROGRAM

## 2022-06-14 RX ORDER — SODIUM CHLORIDE 9 MG/ML
25 INJECTION, SOLUTION INTRAVENOUS PRN
Status: DISCONTINUED | OUTPATIENT
Start: 2022-06-14 | End: 2022-06-14 | Stop reason: HOSPADM

## 2022-06-14 RX ORDER — ONDANSETRON 4 MG/1
4 TABLET, ORALLY DISINTEGRATING ORAL EVERY 8 HOURS PRN
Status: CANCELLED | OUTPATIENT
Start: 2022-06-14

## 2022-06-14 RX ORDER — LIDOCAINE HYDROCHLORIDE 20 MG/ML
INJECTION, SOLUTION INFILTRATION; PERINEURAL PRN
Status: DISCONTINUED | OUTPATIENT
Start: 2022-06-14 | End: 2022-06-14 | Stop reason: SDUPTHER

## 2022-06-14 RX ORDER — SODIUM CHLORIDE, SODIUM LACTATE, POTASSIUM CHLORIDE, CALCIUM CHLORIDE 600; 310; 30; 20 MG/100ML; MG/100ML; MG/100ML; MG/100ML
INJECTION, SOLUTION INTRAVENOUS CONTINUOUS
Status: DISCONTINUED | OUTPATIENT
Start: 2022-06-14 | End: 2022-06-14 | Stop reason: HOSPADM

## 2022-06-14 RX ORDER — ENOXAPARIN SODIUM 100 MG/ML
40 INJECTION SUBCUTANEOUS DAILY
Status: CANCELLED | OUTPATIENT
Start: 2022-06-14

## 2022-06-14 RX ORDER — SODIUM CHLORIDE, SODIUM LACTATE, POTASSIUM CHLORIDE, CALCIUM CHLORIDE 600; 310; 30; 20 MG/100ML; MG/100ML; MG/100ML; MG/100ML
INJECTION, SOLUTION INTRAVENOUS CONTINUOUS PRN
Status: DISCONTINUED | OUTPATIENT
Start: 2022-06-14 | End: 2022-06-14 | Stop reason: SDUPTHER

## 2022-06-14 RX ORDER — SODIUM CHLORIDE 0.9 % (FLUSH) 0.9 %
5-40 SYRINGE (ML) INJECTION PRN
Status: DISCONTINUED | OUTPATIENT
Start: 2022-06-14 | End: 2022-06-14 | Stop reason: HOSPADM

## 2022-06-14 RX ORDER — PROPOFOL 10 MG/ML
INJECTION, EMULSION INTRAVENOUS PRN
Status: DISCONTINUED | OUTPATIENT
Start: 2022-06-14 | End: 2022-06-14 | Stop reason: SDUPTHER

## 2022-06-14 RX ORDER — SODIUM CHLORIDE 0.9 % (FLUSH) 0.9 %
5-40 SYRINGE (ML) INJECTION EVERY 12 HOURS SCHEDULED
Status: CANCELLED | OUTPATIENT
Start: 2022-06-14

## 2022-06-14 RX ORDER — SODIUM CHLORIDE 9 MG/ML
INJECTION, SOLUTION INTRAVENOUS PRN
Status: CANCELLED | OUTPATIENT
Start: 2022-06-14

## 2022-06-14 RX ORDER — SODIUM CHLORIDE 0.9 % (FLUSH) 0.9 %
5-40 SYRINGE (ML) INJECTION EVERY 12 HOURS SCHEDULED
Status: DISCONTINUED | OUTPATIENT
Start: 2022-06-14 | End: 2022-06-14 | Stop reason: HOSPADM

## 2022-06-14 RX ORDER — SODIUM CHLORIDE 0.9 % (FLUSH) 0.9 %
5-40 SYRINGE (ML) INJECTION PRN
Status: CANCELLED | OUTPATIENT
Start: 2022-06-14

## 2022-06-14 RX ORDER — ONDANSETRON 2 MG/ML
4 INJECTION INTRAMUSCULAR; INTRAVENOUS EVERY 6 HOURS PRN
Status: CANCELLED | OUTPATIENT
Start: 2022-06-14

## 2022-06-14 RX ADMIN — SODIUM CHLORIDE, POTASSIUM CHLORIDE, SODIUM LACTATE AND CALCIUM CHLORIDE: 600; 310; 30; 20 INJECTION, SOLUTION INTRAVENOUS at 09:12

## 2022-06-14 RX ADMIN — PROPOFOL 150 MG: 10 INJECTION, EMULSION INTRAVENOUS at 09:13

## 2022-06-14 RX ADMIN — PROPOFOL 150 MG: 10 INJECTION, EMULSION INTRAVENOUS at 09:29

## 2022-06-14 RX ADMIN — LIDOCAINE HYDROCHLORIDE 40 MG: 20 INJECTION, SOLUTION INFILTRATION; PERINEURAL at 09:13

## 2022-06-14 RX ADMIN — PROPOFOL 150 MG: 10 INJECTION, EMULSION INTRAVENOUS at 09:37

## 2022-06-14 RX ADMIN — PROPOFOL 150 MG: 10 INJECTION, EMULSION INTRAVENOUS at 09:19

## 2022-06-14 ASSESSMENT — PAIN SCALES - GENERAL
PAINLEVEL_OUTOF10: 0
PAINLEVEL_OUTOF10: 0

## 2022-06-14 NOTE — DISCHARGE INSTRUCTIONS
Recommendations:  -The patient will be observed post procedure until all discharge criteria are met. -Patient has a contact number available for emergencies. The signs and symptoms of potential delayed complications were discussed with the patient.    -Return to normal activities tomorrow. -Written discharge instructions were provided to the patient.    -Await pathology results.  -Continue current medications.  -Timeframe until next colonoscopy will be discussed in clinic and based on Clinical Guidelines regarding size, number, and pathology of polyps removed as well as adequacy of bowel prep.   -Clinic appointment to be scheduled. EMR to be updated based on findings and discussion. Colonoscopy: What to Expect at [address] [place] [address]  After a colonoscopy, you'll stay at the clinic until you wake up. Then you can go home. But you'll need to arrange for a ride. Your doctor will tell you whenyou can eat and do your other usual activities. Your doctor will talk to you about when you'll need your next colonoscopy. Your doctor can help you decide how often you need to be checked. This will dependon the results of your test and your risk for colorectal cancer. After the test, you may be bloated or have gas pains. You may need to pass gas. If a biopsy was done or a polyp was removed, you may have streaks of blood in your stool (feces) for a few days. Problems such as heavy rectal bleeding may not occur until several weeks after the test. This isn't common. But it canhappen after polyps are removed. This care sheet gives you a general idea about how long it will take for you to recover. But each person recovers at a different pace. Follow the steps belowto get better as quickly as possible. How can you care for yourself at home? Activity     Rest when you feel tired.      You can do your normal activities when it feels okay to do so.    Diet     Follow your doctor's directions for eating.      Unless your doctor has told you not to, drink plenty of fluids. This helps to replace the fluids that were lost during the colon prep.      Do not drink alcohol. Medicines     Your doctor will tell you if and when you can restart your medicines. You will also be given instructions about taking any new medicines.      If you take aspirin or some other blood thinner, ask your doctor if and when to start taking it again. Make sure that you understand exactly what your doctor wants you to do.      If polyps were removed or a biopsy was done during the test, your doctor may tell you not to take aspirin or other anti-inflammatory medicines for a few days. These include ibuprofen (Advil, Motrin) and naproxen (Aleve). Other instructions     For your safety, do not drive or operate machinery until the medicine wears off and you can think clearly. Your doctor may tell you not to drive or operate machinery until the day after your test.      Do not sign legal documents or make major decisions until the medicine wears off and you can think clearly. The anesthesia can make it hard for you to fully understand what you are agreeing to. Follow-up care is a key part of your treatment and safety. Be sure to make and go to all appointments, and call your doctor if you are having problems. It's also a good idea to know your test results and keep alist of the medicines you take. When should you call for help? Call 911 anytime you think you may need emergency care. For example, call if:     You passed out (lost consciousness).      You pass maroon or bloody stools.      You have trouble breathing. Call your doctor now or seek immediate medical care if:     You have pain that does not get better after you take pain medicine.      You are sick to your stomach or cannot drink fluids.      You have new or worse belly pain.      You have blood in your stools.      You have a fever.      You cannot pass stools or gas. Watch closely for changes in your health, and be sure to contact your doctor ifyou have any problems. Where can you learn more? Go to [email or web address] and sign in to your [email or web address] account. Enter E264 in the [employer] box to learn more about \"Colonoscopy: What to Expect at Home. \"     If you do not have an account, please click on the \"Sign Up Now\" link. Current as of: September 8, 2021               Content Version: 13.2  © 3356-9375 Healthwise, Incorporated. Care instructions adapted under license by [facility] ([facility]). If you have questions about a medical condition or this instruction, always ask your healthcare professional. [address] any warranty or liability for your use of this information.

## 2022-06-14 NOTE — ANESTHESIA PRE PROCEDURE
Department of Anesthesiology  Preprocedure Note       Name:  Raul Peters   Age:  19 y.o.  :  2000                                          MRN:  [id number]         Date:  2022      Surgeon: Bonnie Dickinson):  Sarina Alfaro MD    Procedure: Procedure(s):  COLONOSCOPY    Medications prior to admission:   Prior to Admission medications    Medication Sig Start Date End Date Taking? Authorizing Provider   vitamin D3 (CHOLECALCIFEROL) 25 MCG (1000 UT) TABS tablet Take 1 tablet by mouth daily 3/21/22   Sarina Alfaro MD   famotidine (PEPCID) 40 MG tablet Take 0.5 tablets by mouth 2 times daily 3/3/22   Sarina Alfaro MD   mesalamine (CANASA) 1000 MG suppository Place 1 suppository rectally nightly 22   Sarina Alfaro MD   mesalamine (LIALDA) 1.2 g EC tablet Take 4 tablets by mouth daily (with breakfast) 22   Sarina Alfaro MD   dicyclomine (BENTYL) 20 MG tablet Take 1 tablet by mouth 4 times daily as needed (abdominal pain, bloating) 21   Sarina Alfaro MD       Current medications:    Current Facility-Administered Medications   Medication Dose Route Frequency Provider Last Rate Last Admin    lactated ringers infusion   IntraVENous Continuous Gwfarzad Holden MD        sodium chloride flush 0.9 % injection 5-40 mL  5-40 mL IntraVENous 2 times per day Sarina Alfaro MD        sodium chloride flush 0.9 % injection 5-40 mL  5-40 mL IntraVENous PRN Sarina Alfaro MD        0.9 % sodium chloride infusion  25 mL IntraVENous PRN Sarina Alfaro MD           Allergies:     Allergies   Allergen Reactions    Cat Hair Extract     Cattle Epithelium Extract Allergy Skin Test     Dog Epithelium Allergy Skin Test     Mixed Ragweed     Seasonal        Problem List:    Patient Active Problem List   Diagnosis Code    Hx of colonoscopy Z98.890    Allergic rhinitis due to pollen J30.1    Crohn's disease of colon with rectal bleeding (HCC) K50.111    Contact dermatitis L25.9    Colitis K52.9       Past Medical History: Diagnosis Date    Allergic     Allergic rhinitis     Hx of colonoscopy 9/27/2021    9/15/2021 colonoscopy Dr Jason Sotomayor probable Crohn's disease involving a 2 cm circumferential segment in the proximal transverse colon, mild in intension. Anorectal region from the anus at 20 cm with moderate granularity. Asacol HD 2 tablet TID, hydrocortisone suppositories in the evening for 2 weeks, small bowel follow through FU in 3 weeks    IBS (irritable bowel syndrome)        Past Surgical History:        Procedure Laterality Date    COLONOSCOPY N/A 1/4/2022    COLONOSCOPY WITH BIOPSY performed by Celi Sheridan MD at [address] EXTRACTION         Social History:    Social History     Tobacco Use    Smoking status: Never Smoker    Smokeless tobacco: Never Used   Substance Use Topics    Alcohol use: Never                                Counseling given: Not Answered      Vital Signs (Current):   Vitals:    06/13/22 1142   Weight: 151 lb (68.5 kg)   Height: 5' 8\" (1.727 m)                                              BP Readings from Last 3 Encounters:   03/21/22 118/62   01/27/22 116/68   01/04/22 (!) 90/41       NPO Status:                                                                                 BMI:   Wt Readings from Last 3 Encounters:   06/13/22 151 lb (68.5 kg)   03/21/22 142 lb (64.4 kg)   01/27/22 142 lb (64.4 kg)     Body mass index is 22.96 kg/m².     CBC:   Lab Results   Component Value Date    WBC 15.0 12/02/2021    RBC 3.54 12/02/2021    HGB 10.5 12/02/2021    HCT 32.0 12/02/2021    MCV 90.4 12/02/2021    RDW 13.9 12/02/2021     12/02/2021       CMP:   Lab Results   Component Value Date     12/02/2021    K 4.1 12/02/2021    K 4.3 11/22/2021    CL 98 12/02/2021    CO2 28 12/02/2021    BUN 13 12/02/2021    CREATININE 1.1 12/02/2021    GFRAA >60 12/02/2021    LABGLOM >60 12/02/2021    GLUCOSE 108 12/02/2021    PROT 6.6 12/02/2021    CALCIUM 8.7 12/02/2021    BILITOT 0.3 12/02/2021    ALKPHOS 47 12/02/2021    AST 13 12/02/2021    ALT 15 12/02/2021       POC Tests: No results for input(s): POCGLU, POCNA, POCK, POCCL, POCBUN, POCHEMO, POCHCT in the last 72 hours. Coags: No results found for: PROTIME, INR, APTT    HCG (If Applicable): No results found for: PREGTESTUR, PREGSERUM, HCG, HCGQUANT     ABGs: No results found for: PHART, PO2ART, PYQ9WED, CFW4WIW, BEART, P6QVCRKX     Type & Screen (If Applicable):  No results found for: LABABO, LABRH    Drug/Infectious Status (If Applicable):  No results found for: HIV, HEPCAB    COVID-19 Screening (If Applicable):   Lab Results   Component Value Date    COVID19 Positive 01/27/2022           Anesthesia Evaluation  Patient summary reviewed  Airway: Mallampati: III  TM distance: >3 FB   Neck ROM: full  Mouth opening: > = 3 FB   Dental:      Comment: Dentition intact, denies any loose teeth,    Pulmonary:Negative Pulmonary ROS and normal exam  breath sounds clear to auscultation                             Cardiovascular:Negative CV ROS            Rhythm: regular  Rate: normal                    Neuro/Psych:   Negative Neuro/Psych ROS              GI/Hepatic/Renal:            ROS comment: H/O Crohn's disease of colon with rectal bleeding . Endo/Other: Negative Endo/Other ROS                    Abdominal:             Vascular: negative vascular ROS. Other Findings:             Anesthesia Plan      MAC     ASA 2       Induction: intravenous. Anesthetic plan and risks discussed with patient. Plan discussed with CRNA. DOS STAFF ADDENDUM:    Pt seen and examined, chart reviewed (including anesthesia, drug and allergy history). Anesthetic plan, risks, benefits, alternatives, and personnel involved discussed with patient. Patient verbalized an understanding and agrees to proceed. Plan discussed with care team members and agreed upon.     Michael Jansen MD  Staff Anesthesiologist  8:05 AM      Bridget Guardado MD   6/14/2022

## 2022-06-14 NOTE — OP NOTE
Operative Note      Patient: Jessica Solorzano  YOB: 2000  MRN: [id number]    Date of Procedure: 6/14/2022    Pre-Op Diagnosis: [facility] with [facility] [K50.111]    Post-Op Diagnosis: Same       Procedure(s):  COLONOSCOPY WITH BIOPSY    Surgeon(s):  Marian Sifuentes MD    Assistant:   Surgical Assistant: Dayla Bamberger, RN    Anesthesia: Monitor Anesthesia Care    Estimated Blood Loss (mL): Minimal    Complications: None    Specimens:   ID Type Source Tests Collected by Time Destination   A : Bx T.I. Tissue Tissue SURGICAL PATHOLOGY Marian Sifuentes MD 6/14/2022 0915    B : Bx right side colon Tissue Colon SURGICAL PATHOLOGY Marian Sifuentes MD 6/14/2022 0311    C : bx transverse colon Tissue Colon SURGICAL PATHOLOGY Marian Sifuentes MD 6/14/2022 7833    D : bx left side colon Tissue Colon SURGICAL PATHOLOGY Marian Sifuentes MD 6/14/2022 5124    E : Bx rectum Specimen Rectum SURGICAL PATHOLOGY Marian Sifuentes MD 6/14/2022 8587        Implants:  * No implants in log *      Drains: * No LDAs found *    Indications:  21y/M w/ history of Crohn's Colitis currently on therapy with mesalamine here for surveillance and re-staging of disease. Findings:     No active inflammation or ulceration appreciated throughout the entire colon or terminal ileum. Normal terminal ileum. Biopsied. The cecum and ascending colon were overall normal in appearance with mild granularity appreciated. Biopsies obtained. [place] colon with mild edema but otherwise no active inflammation or ulceration. Biopsies obtained. Descending colon and sigmoid colon normal in appearance. Biopsies obtained. Rectum normal in appearance. Biopsies obtained. Detailed Description of Procedure:   Pre-Anesthesia Assessment:  Prior to the procedure, a history and physical was performed and patient medications and allergies were reviewed.   The risks, benefits, complications, treatment options and expected outcomes were discussed with the patient. The possibilities of reaction to medication, pulmonary aspiration, perforation of the gastrointestinal tract, bleeding requiring transfusion or operation, respiratory failure requiring placement on a ventilator, and failure to diagnose a condition or identify polyps/lesions were discussed with the patient. All questions were answered and informed consent was obtained. Patient identification and proposed procedure were verified by the physician, the nurse, the anesthesiologist, the anesthetist, and the technician in the preprocedure area. Please see accompanying documentation. All staff in attendance for the performed procedure act in the role of the Chaperone. After I obtained informed consent, the scope was passed under direct vision. Throughout the procedure, the patient's blood pressure, pulse, and oxygen saturations were monitored continuously. The colonoscope was introduced through the anus and advanced to the terminal ileum. The procedure was performed without difficulty. The patient tolerated the procedure well. Colonoscopy:    Anticoagulants: None. Primary Family Member/s with Colon Cancer: None. The quality of the bowel preparation was adequate. The terminal ileum, the ileocecal valve, the appendiceal orifice, and the rectum via retroflex were photographed. Findings: On Perianal exam, no external hemorrhoids or michael-anal disease was appreciated. The terminal ileum appeared normal. No inflammation or ulceration was appreciated. Biopsies obtained. The mucosa of the cecum and ascending colon was overall normal in appearance. No active inflammation or ulceration was appreciated. Mild granularity was appreciated with no associated edema or decreased vascular pattern. Biopsies were obtained. The mucosa transverse colon was noted to be mildly edematous. Otherwise, no active inflammation or ulceration was appreciated.      The mucosa of the descending colon and sigmoid colon was normal in appearance. No active inflammation or ulceration was appreciated. Biopsies obtained. The mucosa of the rectum was normal in appearance. No active inflammation or ulceration was appreciated. Biopsies obtained. No internal rectal hemorrhoids were present on retroflexion. Recommendations:  -The patient will be observed post procedure until all discharge criteria are met. -Patient has a contact number available for emergencies. The signs and symptoms of potential delayed complications were discussed with the patient.    -Return to normal activities tomorrow. -Written discharge instructions were provided to the patient.    -Await pathology results.  -Continue current medications.  -Timeframe until next colonoscopy will be discussed in clinic and based on Clinical Guidelines regarding size, number, and pathology of polyps removed as well as adequacy of bowel prep.   -Clinic appointment to be scheduled. EMR to be updated based on findings and discussion.      Electronically signed by Len Mosley MD on 6/14/2022 at 9:48 AM

## 2022-06-14 NOTE — PROGRESS NOTES
6/14/22 1130 reviewed discharge instruction with pt and his dad.  Both verbalized understanding, signed in agreement and given copy. sara higuera

## 2022-06-14 NOTE — ANESTHESIA POSTPROCEDURE EVALUATION
Department of Anesthesiology  Postprocedure Note    Patient: Yudith Castillo  MRN: [id number]  YOB: 2000  Date of evaluation: 6/14/2022  Time:  10:15 AM     Procedure Summary     Date: 06/14/22 Room / Location: [address] [place] [address] / [address] [place] [address]    Anesthesia Start: 3868 Anesthesia Stop: 1905    Procedure: COLONOSCOPY WITH BIOPSY (N/A ) Diagnosis:       Crohn's disease of colon with rectal bleeding ([address]      (Crohn's disease of colon with rectal bleeding ([address] [K50.111])    Surgeons: Chucky Ta MD Responsible Provider: Bridget Guardado MD    Anesthesia Type: MAC ASA Status: 2          Anesthesia Type: No value filed. Juan Carlos Phase I: Juan Carlos Score: 10    Juan Carlos Phase II:      Last vitals: Reviewed and per EMR flowsheets.        Anesthesia Post Evaluation    Patient location during evaluation: PACU  Patient participation: complete - patient participated  Level of consciousness: awake and alert  Airway patency: patent  Nausea & Vomiting: no nausea and no vomiting  Complications: no  Cardiovascular status: hemodynamically stable  Respiratory status: acceptable  Hydration status: euvolemic

## 2022-06-14 NOTE — H&P
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 PLAN:  Colonoscopy today        HISTORY OF PRESENT ILLNESS:      Basilia Cruz is a 21y/M w/ history of indeterminate IBD colitis now maintained on mesalamine therapy since early January with improvement in symptoms presents for endoscopic evaluation of re-staging of disease on maintenance therapy. Past Medical History:        Diagnosis Date    Allergic     Allergic rhinitis     Hx of colonoscopy 9/27/2021    9/15/2021 colonoscopy Dr Eagle Mederos probable Crohn's disease involving a 2 cm circumferential segment in the proximal transverse colon, mild in intension. Anorectal region from the anus at 20 cm with moderate granularity. Asacol HD 2 tablet TID, hydrocortisone suppositories in the evening for 2 weeks, small bowel follow through FU in 3 weeks    IBS (irritable bowel syndrome)      Past Surgical History:        Procedure Laterality Date    COLONOSCOPY N/A 1/4/2022    COLONOSCOPY WITH BIOPSY performed by Cheryl Cuellar MD at [address] [facility] ER EXTRACTION       Social History:    TOBACCO:   reports that he has never smoked. He has never used smokeless tobacco.  ETOH:   reports no history of alcohol use. DRUGS:   reports no history of drug use.   Family History:       Problem Relation Age of Onset    Other Mother         blood clots    No Known Problems Father     No Known Problems Sister     No Known Problems Brother          Current Facility-Administered Medications:     lactated ringers infusion, , IntraVENous, Continuous, Tricia Chavez MD    sodium chloride flush 0.9 % injection 5-40 mL, 5-40 mL, IntraVENous, 2 times per day, Cheryl Cuellar MD    sodium chloride flush 0.9 % injection 5-40 mL, 5-40 mL, IntraVENous, PRN, Cheryl Cuellar MD    0.9 % sodium chloride infusion, 25 mL, IntraVENous, PRN, Mónica Bell MD     Allergies:  Cat hair extract, Cattle epithelium extract allergy skin test, Dog epithelium allergy skin test, Mixed ragweed, and Seasonal    ROS:  General: Patient denies n/v/f/c or weight loss. HEENT: Patient denies persistent postnasal drip, scleral icterus, drooling, persistent bleeding from nose/mouth. Resp: Patient denies SOB, wheezing, productive cough. Cards: Patient denies CP, palpitations, significant edema  GI: As above. Derm: Patient denies jaundice/rashes. Musc: Patient denies diffuse/irregular joint swelling or myalgias. PHYSICAL EXAM:  /74   Pulse 66   Temp 97.5 °F (36.4 °C) (Infrared)   Resp 20   Ht 5' 8\" (1.727 m)   Wt 148 lb 4 oz (67.2 kg)   SpO2 99%   BMI 22.54 kg/m²   Physical Exam:  General: Overall well-appearing, NAD  HEENT: PERRLA, EOMI, Anicteric sclera, MMM, no rhinorrhea  Cards: RRR, no LE edema  Resp: Breathing comfortably on room air, good air movement, no use of accessory muscles, no audible wheezing  Abdomen: soft, NT, ND. Extremities: Moves all extremities, no effusions or bruising.   Skin: No rashes or jaundice  Neuro: A&O x 3, CN grossly intact, non-focal exam              Electronically signed by Mónica Bell MD on 6/14/2022 at 8:47 AM

## 2022-07-10 DIAGNOSIS — K52.9 COLITIS: ICD-10-CM

## 2022-07-11 RX ORDER — DICYCLOMINE HCL 20 MG
20 TABLET ORAL 4 TIMES DAILY PRN
Qty: 60 TABLET | Refills: 5 | Status: SHIPPED | OUTPATIENT
Start: 2022-07-11

## 2022-07-11 RX ORDER — FAMOTIDINE 40 MG/1
20 TABLET, FILM COATED ORAL 2 TIMES DAILY
Qty: 30 TABLET | Refills: 3 | Status: SHIPPED | OUTPATIENT
Start: 2022-07-11

## 2022-07-13 RX ORDER — MESALAMINE 1000 MG/1
1000 SUPPOSITORY RECTAL NIGHTLY
Qty: 60 SUPPOSITORY | Refills: 3 | Status: SHIPPED | OUTPATIENT
Start: 2022-07-13

## 2022-07-13 RX ORDER — MESALAMINE 1.2 G/1
4800 TABLET, DELAYED RELEASE ORAL
Qty: 120 TABLET | Refills: 5 | Status: SHIPPED | OUTPATIENT
Start: 2022-07-13

## 2022-07-18 ENCOUNTER — OFFICE VISIT (OUTPATIENT)
Dept: GASTROENTEROLOGY | Age: 22
End: 2022-07-18
Payer: COMMERCIAL

## 2022-07-18 VITALS
BODY MASS INDEX: 23.46 KG/M2 | DIASTOLIC BLOOD PRESSURE: 80 MMHG | OXYGEN SATURATION: 99 % | HEIGHT: 68 IN | TEMPERATURE: 98.2 F | SYSTOLIC BLOOD PRESSURE: 129 MMHG | HEART RATE: 69 BPM | WEIGHT: 154.8 LBS | RESPIRATION RATE: 16 BRPM

## 2022-07-18 DIAGNOSIS — K50.111 CROHN'S DISEASE OF COLON WITH RECTAL BLEEDING (HCC): Primary | ICD-10-CM

## 2022-07-18 DIAGNOSIS — K50.111 CROHN'S DISEASE OF COLON WITH RECTAL BLEEDING (HCC): ICD-10-CM

## 2022-07-18 LAB — VITAMIN D 25-HYDROXY: 29 NG/ML (ref 30–100)

## 2022-07-18 PROCEDURE — 99212 OFFICE O/P EST SF 10 MIN: CPT | Performed by: NURSE PRACTITIONER

## 2022-07-18 NOTE — PROGRESS NOTES
Richard Tadeo (:  2000) is a 19 y.o. male, here for evaluation of the following chief complaint(s):  Follow-up (Colonoscopy )      SUBJECTIVE/OBJECTIVE:  HPI:    Ezekiel Rneee is a very pleasant 19year old gentleman that presents today for a follow up on colonoscopy. Patient has Crohn's disease. Initial colonoscopy in January showed Crohn's disease. Patient was started on Mesalamine orally and Canasa. Repeat colonoscopy in July showed     Findings:      No active inflammation or ulceration appreciated throughout the entire colon or terminal ileum. Normal terminal ileum. Biopsied. The cecum and ascending colon were overall normal in appearance with mild granularity appreciated. Biopsies obtained. Graham colon with mild edema but otherwise no active inflammation or ulceration. Biopsies obtained. Descending colon and sigmoid colon normal in appearance. Biopsies obtained. Rectum normal in appearance. Biopsies obtained. Diagnosis:   A. Terminal ileum, biopsy: Small intestinal mucosa with intact villous   architecture and few benign appearing intramucosal lymphoid aggregates   with germinal center formation, no significant pathologic abnormality   identified. B.  Right colon, biopsy: Chronic colitis with moderate activity. Negative for granulomas and negative for dysplasia. C.  Transverse colon, biopsy: Chronic colitis with moderate activity. Negative for granulomas and negative for dysplasia. D.  Left colon, biopsy: Chronic colitis with moderate to severe activity. Negative for granulomas and negative for dysplasia. E.  Rectum, biopsy: Chronic colitis/proctitis with patchy moderate   activity. Negative for granulomas and negative for dysplasia. Bowels are loose on occasion, no BRBPR. Patient denies extraintestinal manifestations of IBD. ROS:  General: Patient denies n/v/f/c or weight loss.   HEENT: Patient denies persistent postnasal drip, scleral icterus, drooling, persistent bleeding from nose/mouth. Resp: Patient denies SOB, wheezing, productive cough. Cards: Patient denies CP, palpitations, significant edema  GI: As above. Derm: Patient denies jaundice/rashes. Musc: Patient denies diffuse/irregular joint swelling or myalgias. Objective   Wt Readings from Last 3 Encounters:   07/18/22 154 lb 12.8 oz (70.2 kg)   06/14/22 148 lb 4 oz (67.2 kg)   03/21/22 142 lb (64.4 kg)     Temp Readings from Last 3 Encounters:   07/18/22 98.2 °F (36.8 °C) (Temporal)   06/14/22 97 °F (36.1 °C) (Temporal)   03/21/22 97.3 °F (36.3 °C) (Infrared)     BP Readings from Last 3 Encounters:   07/18/22 129/80   06/14/22 (!) 100/58   03/21/22 118/62     Pulse Readings from Last 3 Encounters:   07/18/22 69   06/14/22 64   03/21/22 62        Physical Exam    Past Medical History:   Diagnosis Date    Allergic     Allergic rhinitis     Hx of colonoscopy 9/27/2021    9/15/2021 colonoscopy Dr Ishmael Giron probable Crohn's disease involving a 2 cm circumferential segment in the proximal transverse colon, mild in intension. Anorectal region from the anus at 20 cm with moderate granularity.  Asacol HD 2 tablet TID, hydrocortisone suppositories in the evening for 2 weeks, small bowel follow through FU in 3 weeks    IBS (irritable bowel syndrome)       Past Surgical History:   Procedure Laterality Date    COLONOSCOPY N/A 1/4/2022    COLONOSCOPY WITH BIOPSY performed by Carl Meza MD at [address] [facility] N/A 6/14/2022    COLONOSCOPY WITH BIOPSY performed by Carl Meza MD at [address] [facility]        Family History   Problem Relation Age of Onset    Other Mother         blood clots    No Known Problems Father     No Known Problems Sister     No Known Problems Brother         Lab Results   Component Value Date    WBC 15.0 (H) 12/02/2021    HGB 10.5 (L) 12/02/2021    HCT 32.0 (L) 12/02/2021    MCV 90.4 12/02/2021     12/02/2021      Lab Results   Component Value Date     (L) 12/02/2021    K 4.1 12/02/2021    CL 98 12/02/2021    CO2 28 12/02/2021    BUN 13 12/02/2021    CREATININE 1.1 12/02/2021    GLUCOSE 108 (H) 12/02/2021    CALCIUM 8.7 12/02/2021    PROT 6.6 12/02/2021    LABALBU 3.7 12/02/2021    BILITOT 0.3 12/02/2021    ALKPHOS 47 12/02/2021    AST 13 12/02/2021    ALT 15 12/02/2021    LABGLOM >60 12/02/2021    GFRAA >60 12/02/2021                       ASSESSMENT/PLAN:    Crohn's disease    -Colonoscopy and pathology reviewed with patient today. Due to moderate to severe activity, a change in medication is recommended. After a long discussion on immunosuppressants vs TNF inhibitor, the patient opts for Humira.    -Humira reviewed with patient today.  -Labs ordered   -Will await insurance authorization. Return for Follow up for Humira teaching. An electronic signature was used to authenticate this note.     --ASTER Hernandez - CNP

## 2022-07-19 RX ORDER — ERGOCALCIFEROL 1.25 MG/1
50000 CAPSULE ORAL WEEKLY
Qty: 4 CAPSULE | Refills: 5 | Status: SHIPPED | OUTPATIENT
Start: 2022-07-19

## 2022-08-03 ENCOUNTER — PATIENT MESSAGE (OUTPATIENT)
Dept: GASTROENTEROLOGY | Age: 22
End: 2022-08-03

## 2022-08-04 DIAGNOSIS — K50.111 CROHN'S DISEASE OF COLON WITH RECTAL BLEEDING (HCC): ICD-10-CM

## 2022-08-05 LAB
HBV SURFACE AB TITR SER: NORMAL {TITER}
HEPATITIS B CORE TOTAL ANTIBODY: NONREACTIVE
HEPATITIS B SURFACE ANTIGEN INTERPRETATION: NORMAL
HEPATITIS C ANTIBODY INTERPRETATION: NORMAL

## 2022-08-06 LAB — HAV AB SERPL IA-ACNC: POSITIVE

## 2022-08-08 DIAGNOSIS — K50.111 CROHN'S DISEASE OF COLON WITH RECTAL BLEEDING (HCC): Primary | ICD-10-CM

## 2022-08-08 LAB
COMMENT: NORMAL
REPORT: NORMAL

## 2022-08-08 RX ORDER — PREDNISONE 10 MG/1
TABLET ORAL
Qty: 100 TABLET | Refills: 0 | Status: SHIPPED | OUTPATIENT
Start: 2022-08-08 | End: 2022-09-17

## 2022-08-12 DIAGNOSIS — K50.111 CROHN'S DISEASE OF COLON WITH RECTAL BLEEDING (HCC): Primary | ICD-10-CM

## 2022-08-12 NOTE — TELEPHONE ENCOUNTER
Called patient and verified if he needs stool kit he confirmed that he does so I gathered supplies and notified patient that it will be downstairs in a brown bag waiting for him

## 2022-08-13 ENCOUNTER — HOSPITAL ENCOUNTER (OUTPATIENT)
Age: 22
Setting detail: SPECIMEN
Discharge: HOME | End: 2022-08-13
Payer: COMMERCIAL

## 2022-08-13 DIAGNOSIS — K50.111 CROHN'S DISEASE OF COLON WITH RECTAL BLEEDING (HCC): ICD-10-CM

## 2022-08-13 PROCEDURE — 87045 FECES CULTURE AEROBIC BACT: CPT

## 2022-08-15 LAB — CULTURE, STOOL 2: NORMAL

## 2022-08-19 ENCOUNTER — OFFICE VISIT (OUTPATIENT)
Dept: GASTROENTEROLOGY | Age: 22
End: 2022-08-19
Payer: COMMERCIAL

## 2022-08-19 VITALS
DIASTOLIC BLOOD PRESSURE: 56 MMHG | WEIGHT: 153 LBS | HEIGHT: 68 IN | BODY MASS INDEX: 23.19 KG/M2 | OXYGEN SATURATION: 99 % | SYSTOLIC BLOOD PRESSURE: 125 MMHG | RESPIRATION RATE: 16 BRPM | TEMPERATURE: 97 F | HEART RATE: 84 BPM

## 2022-08-19 DIAGNOSIS — K50.111 CROHN'S DISEASE OF COLON WITH RECTAL BLEEDING (HCC): ICD-10-CM

## 2022-08-19 DIAGNOSIS — K50.111 CROHN'S DISEASE OF COLON WITH RECTAL BLEEDING (HCC): Primary | ICD-10-CM

## 2022-08-19 LAB
ACANTHOCYTES: ABNORMAL
ALBUMIN SERPL-MCNC: 4.4 G/DL (ref 3.5–5.2)
ALP BLD-CCNC: 81 U/L (ref 40–129)
ALT SERPL-CCNC: 16 U/L (ref 0–40)
ANION GAP SERPL CALCULATED.3IONS-SCNC: 11 MMOL/L (ref 7–16)
ANISOCYTOSIS: ABNORMAL
AST SERPL-CCNC: 13 U/L (ref 0–39)
BASOPHILIC STIPPLING: ABNORMAL
BASOPHILS ABSOLUTE: 0 E9/L (ref 0–0.2)
BASOPHILS RELATIVE PERCENT: 0.3 % (ref 0–2)
BILIRUB SERPL-MCNC: 0.2 MG/DL (ref 0–1.2)
BUN BLDV-MCNC: 17 MG/DL (ref 6–20)
C-REACTIVE PROTEIN: 0.5 MG/DL (ref 0–0.4)
CALCIUM SERPL-MCNC: 9.1 MG/DL (ref 8.6–10.2)
CHLORIDE BLD-SCNC: 104 MMOL/L (ref 98–107)
CO2: 25 MMOL/L (ref 22–29)
CREAT SERPL-MCNC: 1.1 MG/DL (ref 0.7–1.2)
EOSINOPHILS ABSOLUTE: 0 E9/L (ref 0.05–0.5)
EOSINOPHILS RELATIVE PERCENT: 0.2 % (ref 0–6)
GFR AFRICAN AMERICAN: >60
GFR NON-AFRICAN AMERICAN: >60 ML/MIN/1.73
GLUCOSE BLD-MCNC: 98 MG/DL (ref 74–99)
HCT VFR BLD CALC: 33.1 % (ref 37–54)
HEMOGLOBIN: 9.1 G/DL (ref 12.5–16.5)
HYPOCHROMIA: ABNORMAL
LYMPHOCYTES ABSOLUTE: 0.38 E9/L (ref 1.5–4)
LYMPHOCYTES RELATIVE PERCENT: 2.6 % (ref 20–42)
MCH RBC QN AUTO: 17.7 PG (ref 26–35)
MCHC RBC AUTO-ENTMCNC: 27.5 % (ref 32–34.5)
MCV RBC AUTO: 64.4 FL (ref 80–99.9)
MONOCYTES ABSOLUTE: 0.12 E9/L (ref 0.1–0.95)
MONOCYTES RELATIVE PERCENT: 0.9 % (ref 2–12)
NEUTROPHILS ABSOLUTE: 12.13 E9/L (ref 1.8–7.3)
NEUTROPHILS RELATIVE PERCENT: 96.5 % (ref 43–80)
OVALOCYTES: ABNORMAL
PDW BLD-RTO: 22.7 FL (ref 11.5–15)
PLATELET # BLD: 313 E9/L (ref 130–450)
PMV BLD AUTO: ABNORMAL FL (ref 7–12)
POIKILOCYTES: ABNORMAL
POLYCHROMASIA: ABNORMAL
POTASSIUM SERPL-SCNC: 4.3 MMOL/L (ref 3.5–5)
RBC # BLD: 5.14 E12/L (ref 3.8–5.8)
SEDIMENTATION RATE, ERYTHROCYTE: 8 MM/HR (ref 0–15)
SODIUM BLD-SCNC: 140 MMOL/L (ref 132–146)
TARGET CELLS: ABNORMAL
TOTAL PROTEIN: 7.3 G/DL (ref 6.4–8.3)
WBC # BLD: 12.5 E9/L (ref 4.5–11.5)

## 2022-08-19 PROCEDURE — 99212 OFFICE O/P EST SF 10 MIN: CPT | Performed by: NURSE PRACTITIONER

## 2022-08-19 NOTE — PROGRESS NOTES
Joaquin Gosselin (:  2000) is a 19 y.o. male, here for evaluation of the following chief complaint(s):  Follow-up (Go Over Humira Pen )      SUBJECTIVE/OBJECTIVE:  HPI:    David Arredondo is a very pleasant 19year old gentleman that presents today for Humira instructions for Crohn's disease    Patients father is present today. Humira reviewed with patient today along with possible side effects. Patient agreeable to proceed with the injections. Patient tells me today that he was having frequent bloody loose stools with Mesalamine 4.8 G daily and Canasa  Colonoscopy pathology showed chronic colitis with moderate activity throughout the colon  Patient was interested in an injection rather than oral medication  On Prednisone wean, down to 35 mg a day       ROS:  General: Patient denies n/v/f/c or weight loss. HEENT: Patient denies persistent postnasal drip, scleral icterus, drooling, persistent bleeding from nose/mouth. Resp: Patient denies SOB, wheezing, productive cough. Cards: Patient denies CP, palpitations, significant edema  GI: As above. Derm: Patient denies jaundice/rashes. Musc: Patient denies diffuse/irregular joint swelling or myalgias.       Objective   Wt Readings from Last 3 Encounters:   22 153 lb (69.4 kg)   22 154 lb 12.8 oz (70.2 kg)   22 148 lb 4 oz (67.2 kg)     Temp Readings from Last 3 Encounters:   22 97 °F (36.1 °C) (Temporal)   22 98.2 °F (36.8 °C) (Temporal)   22 97 °F (36.1 °C) (Temporal)     BP Readings from Last 3 Encounters:   22 (!) 125/56   22 129/80   22 (!) 100/58     Pulse Readings from Last 3 Encounters:   22 84   22 69   22 64        Physical Exam    Past Medical History:   Diagnosis Date    Allergic     Allergic rhinitis     Hx of colonoscopy 2021    9/15/2021 colonoscopy Dr Franklyn Braswell probable Crohn's disease involving a 2 cm circumferential segment in the proximal transverse colon, mild in intension. Anorectal region from the anus at 20 cm with moderate granularity. Asacol HD 2 tablet TID, hydrocortisone suppositories in the evening for 2 weeks, small bowel follow through FU in 3 weeks    IBS (irritable bowel syndrome)       Past Surgical History:   Procedure Laterality Date    COLONOSCOPY N/A 1/4/2022    COLONOSCOPY WITH BIOPSY performed by Jose De Jesus Isaacs MD at [address] [address] N/A 6/14/2022    COLONOSCOPY WITH BIOPSY performed by Jose De Jesus Isaacs MD at [address] [facility]        Family History   Problem Relation Age of Onset    Other Mother         blood clots    No Known Problems Father     No Known Problems Sister     No Known Problems Brother         Lab Results   Component Value Date    WBC 15.0 (H) 12/02/2021    HGB 10.5 (L) 12/02/2021    HCT 32.0 (L) 12/02/2021    MCV 90.4 12/02/2021     12/02/2021      Lab Results   Component Value Date     (L) 12/02/2021    K 4.1 12/02/2021    CL 98 12/02/2021    CO2 28 12/02/2021    BUN 13 12/02/2021    CREATININE 1.1 12/02/2021    GLUCOSE 108 (H) 12/02/2021    CALCIUM 8.7 12/02/2021    PROT 6.6 12/02/2021    LABALBU 3.7 12/02/2021    BILITOT 0.3 12/02/2021    ALKPHOS 47 12/02/2021    AST 13 12/02/2021    ALT 15 12/02/2021    LABGLOM >60 12/02/2021    GFRAA >60 12/02/2021                       ASSESSMENT/PLAN:    1. Crohn's disease of colon with rectal bleeding ([facility] [address]  -     Comprehensive Metabolic Panel; Future  -     C-Reactive Protein; Future  -     Sedimentation Rate; Future  -     CBC with Auto Differential; Future    -Routine labs ordered  -Instruction given on the Humira injection. Questions answered and support offered. Information printed and given to patient. Two 80 mg injections of Humira given in the abdomen. Patient tolerated injections. He remained in the room for 15 to 20 minutes post injection. Patient was unable to return demonstrate the injection today.   He will return on day 15 for next loading dose. Return for Follow up on day 15. An electronic signature was used to authenticate this note.     --ASTER Pavon - CNP

## 2022-09-02 ENCOUNTER — NURSE ONLY (OUTPATIENT)
Dept: GASTROENTEROLOGY | Age: 22
End: 2022-09-02

## 2022-09-02 VITALS
SYSTOLIC BLOOD PRESSURE: 136 MMHG | RESPIRATION RATE: 16 BRPM | HEIGHT: 68 IN | OXYGEN SATURATION: 98 % | WEIGHT: 149 LBS | DIASTOLIC BLOOD PRESSURE: 72 MMHG | BODY MASS INDEX: 22.58 KG/M2 | TEMPERATURE: 98 F | HEART RATE: 80 BPM

## 2022-09-02 DIAGNOSIS — K50.111 CROHN'S DISEASE OF COLON WITH RECTAL BLEEDING (HCC): Primary | ICD-10-CM

## 2022-09-02 PROCEDURE — 99999 PR OFFICE/OUTPT VISIT,PROCEDURE ONLY: CPT | Performed by: NURSE PRACTITIONER

## 2022-09-07 ENCOUNTER — TELEPHONE (OUTPATIENT)
Dept: INTERNAL MEDICINE | Age: 22
End: 2022-09-07

## 2022-09-07 NOTE — TELEPHONE ENCOUNTER
Contacted patient in attempts to schedule his initial SMS visit with Dr. Mague Gutierrez. Right now the next available appointment is September 19th. Patient reports he has to check his work schedule and will get back with me. Sending a Therio message per patient's request as a reminder.

## 2022-09-15 NOTE — TELEPHONE ENCOUNTER
Specialty Medication Service    Date: 9/15/2022  Patient's Name: Franklyn Parent YOB: 2000            _____________________________________________________________________________________________    Left message to schedule Medical Director  appointment for Specialty Medication Services. Please call: [phone] option [phone]. Will continue to outreach as appropriate.     Zhanna Lozada CPhT  Clinical    Specialty Medication Service   [phone] option 4

## 2022-09-16 ENCOUNTER — TELEPHONE (OUTPATIENT)
Dept: INTERNAL MEDICINE | Age: 22
End: 2022-09-16

## 2022-09-16 RX ORDER — ADALIMUMAB 40MG/0.4ML
40 KIT SUBCUTANEOUS
COMMUNITY
End: 2022-09-19 | Stop reason: SDUPTHER

## 2022-09-19 ENCOUNTER — PHARMACY VISIT (OUTPATIENT)
Dept: INTERNAL MEDICINE | Age: 22
End: 2022-09-19

## 2022-09-19 DIAGNOSIS — K50.111 CROHN'S DISEASE OF COLON WITH RECTAL BLEEDING (HCC): Primary | ICD-10-CM

## 2022-09-19 PROCEDURE — 99999 PR OFFICE/OUTPT VISIT,PROCEDURE ONLY: CPT | Performed by: INTERNAL MEDICINE

## 2022-09-19 PROCEDURE — 1111F DSCHRG MED/CURRENT MED MERGE: CPT | Performed by: INTERNAL MEDICINE

## 2022-09-19 RX ORDER — ADALIMUMAB 40MG/0.4ML
40 KIT SUBCUTANEOUS
Qty: 2 EACH | Refills: 6 | Status: SHIPPED | OUTPATIENT
Start: 2022-09-19

## 2022-09-19 ASSESSMENT — PATIENT HEALTH QUESTIONNAIRE - PHQ9: DEPRESSION UNABLE TO ASSESS: URGENT/EMERGENT SITUATION

## 2022-09-19 NOTE — PROGRESS NOTES
Initial Specialty Medication Virtual Visit  [facility] PHYSICIANS  [facility] [address] [address] [address]  Dept: [phone]  Dept Fax: [phone]  Date of patient's visit: 9/19/2022  Patient's Name:  Nahomi Wade YOB: 2000            Patient Care Team:  ASTER Turner CNP as PCP - General (Family Nurse Practitioner)  ASTER Turner CNP as PCP - [facility]  ================================================================    REASON FOR VISIT/CHIEF COMPLAINT:  Medication Management (Humira )    HISTORY OF PRESENTING ILLNESS:  Nahomi Wade is 19 y.o. is here for initial virtual visit for specialty medication. Specialty Medication: Humira 40 mg/0.4 ml pen  Frequency: Every 14 days  Indication: Crohns   Initially Diagnosed: 2021  Specialist: Alize Dowling NP , GI  Last Specialist Visit: 08/2022  Side effects includes: None   Current symptoms include: Diarrhea and blood in stools. Not seeing any improvement. Has received 3 doses so far. He has appt tomorrow with GI. Nahomi Wade has no new complain today. Recent blood work done on 08/2022 are reviewed.         DIAGNOSTIC FINDINGS:  CBC:  Lab Results   Component Value Date/Time    WBC 12.5 08/19/2022 10:39 AM    HGB 9.1 08/19/2022 10:39 AM     08/19/2022 10:39 AM       BMP:    Lab Results   Component Value Date/Time     08/19/2022 10:39 AM    K 4.3 08/19/2022 10:39 AM    K 4.3 11/22/2021 06:12 AM     08/19/2022 10:39 AM    CO2 25 08/19/2022 10:39 AM    BUN 17 08/19/2022 10:39 AM    CREATININE 1.1 08/19/2022 10:39 AM    GLUCOSE 98 08/19/2022 10:39 AM       HEMOGLOBIN A1C: No results found for: LABA1C    FASTING LIPID PANEL:No results found for: CHOL, HDL, TRIG    No results found for: GUILLERMO    Lab Results   Component Value Date    HAV Positive (A) 08/04/2022    HEPBCAB NONREACTIVE 08/04/2022           Patient Active Problem List   Diagnosis    Hx of colonoscopy    Allergic rhinitis due to pollen    Crohn's disease of colon with rectal bleeding ([address]    Contact dermatitis    Colitis       Health Maintenance Due   Topic Date Due    DTaP/Tdap/Td vaccine (7 - Td or Tdap) 11/09/2021    Flu vaccine (1) 09/01/2022    Depression Screen  10/14/2022       Allergies   Allergen Reactions    Cat Hair Extract     Cattle Epithelium Extract Allergy Skin Test     Dog Epithelium Allergy Skin Test     Mixed Ragweed     Seasonal          Current Outpatient Medications   Medication Sig Dispense Refill    Adalimumab (HUMIRA PEN) 40 MG/0.4ML PNKT Inject 40 mg into the skin every 14 days 2 each 6    vitamin D (ERGOCALCIFEROL) 1.25 MG (20935 UT) CAPS capsule Take 1 capsule by mouth once a week 4 capsule 5    mesalamine (CANASA) 1000 MG suppository Place 1 suppository rectally nightly 60 suppository 3    mesalamine (LIALDA) 1.2 g EC tablet Take 4 tablets by mouth daily (with breakfast) 120 tablet 5    dicyclomine (BENTYL) 20 MG tablet Take 1 tablet by mouth 4 times daily as needed (abdominal pain, bloating) 60 tablet 5    famotidine (PEPCID) 40 MG tablet Take 0.5 tablets by mouth 2 times daily 30 tablet 3    vitamin D3 (CHOLECALCIFEROL) 25 MCG (1000 UT) TABS tablet Take 1 tablet by mouth daily 90 tablet 5     No current facility-administered medications for this visit. Social History     Tobacco Use    Smoking status: Never    Smokeless tobacco: Never   Vaping Use    Vaping Use: Never used   Substance Use Topics    Alcohol use: Never    Drug use: Never       Family History   Problem Relation Age of Onset    Other Mother         blood clots    No Known Problems Father     No Known Problems Sister     No Known Problems Brother         REVIEW OF SYSTEMS:  Review of Systems    PHYSICAL EXAM:  There were no vitals filed for this visit.   BP Readings from Last 3 Encounters:   09/02/22 136/72   08/19/22 (!) 125/56   07/18/22 129/80          ASSESSMENT AND PLAN:  Tammi Franz was seen today for medication management. Diagnoses and all orders for this visit:    Crohn's disease of colon with rectal bleeding ([address]  -     Adalimumab (HUMIRA PEN) 40 MG/0.4ML PNKT; Inject 40 mg into the skin every 14 days  -     [facility]) Specialty Medication Service      FOLLOW UP AND INSTRUCTIONS:  Follow up with 6 months    Pavithra Moreno received counseling on the following healthy behaviors: nutrition, exercise, and medication adherence    Discussed use, benefit, and side effects of prescribed medications. Barriers to medication compliance addressed. All patient questions answered. Pt voiced understanding.       Shy Flood  Director [facility] pharmacy program  Faculty Internal Medicine    [facility]  [address] [place] [address]    9/19/2022, 10:02 AM

## 2022-09-20 ENCOUNTER — OFFICE VISIT (OUTPATIENT)
Dept: FAMILY MEDICINE CLINIC | Age: 22
End: 2022-09-20
Payer: COMMERCIAL

## 2022-09-20 ENCOUNTER — OFFICE VISIT (OUTPATIENT)
Dept: GASTROENTEROLOGY | Age: 22
End: 2022-09-20
Payer: COMMERCIAL

## 2022-09-20 VITALS
HEART RATE: 80 BPM | HEIGHT: 68 IN | RESPIRATION RATE: 18 BRPM | BODY MASS INDEX: 22.04 KG/M2 | WEIGHT: 145.4 LBS | DIASTOLIC BLOOD PRESSURE: 66 MMHG | SYSTOLIC BLOOD PRESSURE: 118 MMHG | OXYGEN SATURATION: 98 % | TEMPERATURE: 98 F

## 2022-09-20 VITALS
RESPIRATION RATE: 16 BRPM | HEART RATE: 78 BPM | BODY MASS INDEX: 21.98 KG/M2 | TEMPERATURE: 97.8 F | WEIGHT: 145 LBS | HEIGHT: 68 IN | OXYGEN SATURATION: 99 % | DIASTOLIC BLOOD PRESSURE: 80 MMHG | SYSTOLIC BLOOD PRESSURE: 138 MMHG

## 2022-09-20 DIAGNOSIS — K50.111 CROHN'S DISEASE OF COLON WITH RECTAL BLEEDING (HCC): Primary | ICD-10-CM

## 2022-09-20 DIAGNOSIS — K52.9 COLITIS: ICD-10-CM

## 2022-09-20 DIAGNOSIS — J02.8 ACUTE PHARYNGITIS DUE TO OTHER SPECIFIED ORGANISMS: Primary | ICD-10-CM

## 2022-09-20 LAB
Lab: NORMAL
PERFORMING INSTRUMENT: NORMAL
QC PASS/FAIL: NORMAL
S PYO AG THROAT QL: NORMAL
SARS-COV-2, POC: NORMAL

## 2022-09-20 PROCEDURE — 87880 STREP A ASSAY W/OPTIC: CPT | Performed by: FAMILY MEDICINE

## 2022-09-20 PROCEDURE — 87426 SARSCOV CORONAVIRUS AG IA: CPT | Performed by: FAMILY MEDICINE

## 2022-09-20 PROCEDURE — 99212 OFFICE O/P EST SF 10 MIN: CPT | Performed by: NURSE PRACTITIONER

## 2022-09-20 PROCEDURE — 99213 OFFICE O/P EST LOW 20 MIN: CPT | Performed by: FAMILY MEDICINE

## 2022-09-20 RX ORDER — AZITHROMYCIN 250 MG/1
250 TABLET, FILM COATED ORAL SEE ADMIN INSTRUCTIONS
Qty: 6 TABLET | Refills: 0 | Status: SHIPPED | OUTPATIENT
Start: 2022-09-20 | End: 2022-09-25

## 2022-09-20 RX ORDER — PREDNISONE 10 MG/1
TABLET ORAL
Qty: 100 TABLET | Refills: 0 | Status: SHIPPED
Start: 2022-09-20 | End: 2022-09-30 | Stop reason: ALTCHOICE

## 2022-09-20 RX ORDER — SODIUM, POTASSIUM,MAG SULFATES 17.5-3.13G
1 SOLUTION, RECONSTITUTED, ORAL ORAL ONCE
Qty: 1 EACH | Refills: 0 | Status: SHIPPED | OUTPATIENT
Start: 2022-09-20 | End: 2022-09-20

## 2022-09-20 ASSESSMENT — ENCOUNTER SYMPTOMS
PHOTOPHOBIA: 0
NAUSEA: 0
COUGH: 1
BLOOD IN STOOL: 0
TROUBLE SWALLOWING: 0
ALLERGIC/IMMUNOLOGIC NEGATIVE: 1
CHEST TIGHTNESS: 0
SHORTNESS OF BREATH: 0
SINUS PAIN: 0
EYE REDNESS: 0
ABDOMINAL PAIN: 0
DIARRHEA: 0
SORE THROAT: 1
VOMITING: 0
EYE PAIN: 0
BACK PAIN: 0
EYE DISCHARGE: 0

## 2022-09-20 NOTE — PROGRESS NOTES
Jannie Alexis (:  2000) is a 19 y.o. male, here for evaluation of the following chief complaint(s):  Follow-up      SUBJECTIVE/OBJECTIVE:  HPI:    David Dalal is a very pleasant 19year old gentleman that presents today for follow up on Humira for Crohn's disease    Patients mother tells me today that Eder's symptoms never improved with prednisone or initiation of Humira    Having about 8 watery stools a day with blood since the beginning of August  There is some abdominal pain  Patient completed the steroid wean about 4 days ago  Today finally noting some form to stools  Being awakened to stools throughout the night    Patient has finished the loading doses of Humira and one routine dose of 40 mg thus far  Upon direct questioning, the patient admits that there has been some improvement in his symptoms since his initial diagnosis of IBD but have not completely resolved  Patient admits that symptoms worsened as he weaned from prednisone  Asking questions about dietary modification             ROS:  General: Patient denies n/v/f/c or weight loss. HEENT: Patient denies persistent postnasal drip, scleral icterus, drooling, persistent bleeding from nose/mouth. Resp: Patient denies SOB, wheezing, productive cough. Cards: Patient denies CP, palpitations, significant edema  GI: As above. Derm: Patient denies jaundice/rashes. Musc: Patient denies diffuse/irregular joint swelling or myalgias.       Objective   Wt Readings from Last 3 Encounters:   22 145 lb (65.8 kg)   22 145 lb 6.4 oz (66 kg)   22 149 lb (67.6 kg)     Temp Readings from Last 3 Encounters:   22 97.8 °F (36.6 °C) (Temporal)   22 98 °F (36.7 °C) (Infrared)   22 98 °F (36.7 °C) (Temporal)     BP Readings from Last 3 Encounters:   22 138/80   22 118/66   22 136/72     Pulse Readings from Last 3 Encounters:   22 78   22 80   22 80        Physical Exam    Past Medical History: Diagnosis Date    Allergic     Allergic rhinitis     Hx of colonoscopy 9/27/2021    9/15/2021 colonoscopy Dr Holbrook Border probable Crohn's disease involving a 2 cm circumferential segment in the proximal transverse colon, mild in intension. Anorectal region from the anus at 20 cm with moderate granularity. Asacol HD 2 tablet TID, hydrocortisone suppositories in the evening for 2 weeks, small bowel follow through FU in 3 weeks    IBS (irritable bowel syndrome)       Past Surgical History:   Procedure Laterality Date    COLONOSCOPY N/A 1/4/2022    COLONOSCOPY WITH BIOPSY performed by Rebecca Marshall MD at [address] [facility] [address] N/A 6/14/2022    COLONOSCOPY WITH BIOPSY performed by Rebecca Marshall MD at [address]        Family History   Problem Relation Age of Onset    Other Mother         blood clots    No Known Problems Father     No Known Problems Sister     No Known Problems Brother         Lab Results   Component Value Date    WBC 12.5 (H) 08/19/2022    HGB 9.1 (L) 08/19/2022    HCT 33.1 (L) 08/19/2022    MCV 64.4 (L) 08/19/2022     08/19/2022      Lab Results   Component Value Date     08/19/2022    K 4.3 08/19/2022     08/19/2022    CO2 25 08/19/2022    BUN 17 08/19/2022    CREATININE 1.1 08/19/2022    GLUCOSE 98 08/19/2022    CALCIUM 9.1 08/19/2022    PROT 7.3 08/19/2022    LABALBU 4.4 08/19/2022    BILITOT 0.2 08/19/2022    ALKPHOS 81 08/19/2022    AST 13 08/19/2022    ALT 16 08/19/2022    LABGLOM >60 08/19/2022    GFRAA >60 08/19/2022                       ASSESSMENT/PLAN:    1. Crohn's disease of colon with rectal bleeding ([facility] [address] [K50.111 (ICD-10-CM)]  -     Clostridium difficile EIA; Future  -     Culture, Stool 2; Future  -     Calprotectin Stool; Future  -     C-Reactive Protein; Future  -     Sedimentation Rate; Future  -     CBC; Future  -     Comprehensive Metabolic Panel; Future  -     Miscellaneous Sendout;  Future  -     Na Sulfate-K Sulfate-Mg Sulf (SUPREP BOWEL PREP KIT) 17.5-3.13-1.6 GM/177ML SOLN solution; Take 1 kit by mouth once for 1 dose, Disp-1 each, R-0Normal  -     External Referral To Nutrition Services  2. Colitis    -I will obtain labs to rule out IBD flare  -Stool cultures ordered along with fecal calprotectin  -reviewed Humira with patient and family today addressing the amount of time it can take for remission  -patient will repeat the prednisone wean  -referral to dietician placed  -RTV 3 weeks    Return for Follow up in 3 weeks. An electronic signature was used to authenticate this note.     --Nandini Juarez, APRN - CNP

## 2022-09-20 NOTE — PROGRESS NOTES
22  Katherine Rose : 2000 Sex: male  Age: 19 y.o. Assessment and Plan:  Salvador Frias was seen today for cough, pharyngitis and headache. Diagnoses and all orders for this visit:    Acute pharyngitis due to other specified organisms  -     POCT rapid strep A  -     POCT COVID-19, Antigen  -     azithromycin (ZITHROMAX) 250 MG tablet; Take 1 tablet by mouth See Admin Instructions for 5 days 500mg on day 1 followed by 250mg on days 2 - 5    Rapid antigen test for strep and COVID were both negative. We will go ahead and treat empirically with a Z-Bebo and dramatically with Tylenol, fluids, rest, Mucinex, Claritin, coolmist.  Should his complaints not improve, or worsen in any way, he should follow-up with his PCP. Return 1 to 3 days with PCP if not improved. .    Chief Complaint   Patient presents with    Cough    Pharyngitis     5-6 days     Headache       Congestion, pressure, drainage, facial tenderness, mild headache, sore throat, cough, onset 4 days ago. Home rapid antigen test for COVID was negative on Saturday. Denies fever, chills, diaphoresis, nausea, vomiting, decreased oral intake. Denies other GI or  complaints. OTC treatments minimally effective. Review of Systems   Constitutional:  Negative for appetite change, fatigue and unexpected weight change. HENT:  Positive for congestion, postnasal drip and sore throat. Negative for ear pain, hearing loss, sinus pain and trouble swallowing. Eyes:  Negative for photophobia, pain, discharge and redness. Respiratory:  Positive for cough. Negative for chest tightness and shortness of breath. Cardiovascular:  Negative for chest pain, palpitations and leg swelling. Gastrointestinal:  Negative for abdominal pain, blood in stool, diarrhea, nausea and vomiting. Endocrine: Negative. Genitourinary:  Negative for dysuria, flank pain, frequency and hematuria.    Musculoskeletal:  Negative for arthralgias, back pain, joint swelling and myalgias. Skin: Negative. Allergic/Immunologic: Negative. Neurological:  Positive for headaches. Negative for dizziness, seizures, syncope, weakness, light-headedness and numbness. Hematological:  Negative for adenopathy. Does not bruise/bleed easily. Psychiatric/Behavioral: Negative. Current Outpatient Medications:     azithromycin (ZITHROMAX) 250 MG tablet, Take 1 tablet by mouth See Admin Instructions for 5 days 500mg on day 1 followed by 250mg on days 2 - 5, Disp: 6 tablet, Rfl: 0    Adalimumab (HUMIRA PEN) 40 MG/0.4ML PNKT, Inject 40 mg into the skin every 14 days, Disp: 2 each, Rfl: 6    vitamin D (ERGOCALCIFEROL) 1.25 MG (68226 UT) CAPS capsule, Take 1 capsule by mouth once a week, Disp: 4 capsule, Rfl: 5    mesalamine (CANASA) 1000 MG suppository, Place 1 suppository rectally nightly, Disp: 60 suppository, Rfl: 3    mesalamine (LIALDA) 1.2 g EC tablet, Take 4 tablets by mouth daily (with breakfast), Disp: 120 tablet, Rfl: 5    dicyclomine (BENTYL) 20 MG tablet, Take 1 tablet by mouth 4 times daily as needed (abdominal pain, bloating), Disp: 60 tablet, Rfl: 5    famotidine (PEPCID) 40 MG tablet, Take 0.5 tablets by mouth 2 times daily, Disp: 30 tablet, Rfl: 3    predniSONE (DELTASONE) 10 MG tablet, Take 4 tablets by mouth daily for 5 days, THEN 3.5 tablets daily for 5 days, THEN 3 tablets daily for 5 days, THEN 2.5 tablets daily for 5 days, THEN 2 tablets daily for 5 days, THEN 1.5 tablets daily for 5 days, THEN 1 tablet daily for 5 days, THEN 0.5 tablets daily for 5 days.  (Patient not taking: Reported on 9/20/2022), Disp: 100 tablet, Rfl: 0    vitamin D3 (CHOLECALCIFEROL) 25 MCG (1000 UT) TABS tablet, Take 1 tablet by mouth daily (Patient not taking: Reported on 9/20/2022), Disp: 90 tablet, Rfl: 5  Allergies   Allergen Reactions    Cat Hair Extract     Cattle Epithelium Extract Allergy Skin Test     Dog Epithelium Allergy Skin Test     Mixed Ragweed     Seasonal        Past Medical History:   Diagnosis Date    Allergic     Allergic rhinitis     Hx of colonoscopy 9/27/2021    9/15/2021 colonoscopy Dr Roseann Mosley probable Crohn's disease involving a 2 cm circumferential segment in the proximal transverse colon, mild in intension. Anorectal region from the anus at 20 cm with moderate granularity. Asacol HD 2 tablet TID, hydrocortisone suppositories in the evening for 2 weeks, small bowel follow through FU in 3 weeks    IBS (irritable bowel syndrome)      Past Surgical History:   Procedure Laterality Date    COLONOSCOPY N/A 1/4/2022    COLONOSCOPY WITH BIOPSY performed by Franki Garcia MD at [facility] N/A 6/14/2022    COLONOSCOPY WITH BIOPSY performed by Franki Garcia MD at [facility] EXTRACTION       Family History   Problem Relation Age of Onset    Other Mother         blood clots    No Known Problems Father     No Known Problems Sister     No Known Problems Brother      Social History     Socioeconomic History    Marital status: Single     Spouse name: Not on file    Number of children: Not on file    Years of education: Not on file    Highest education level: Not on file   Occupational History    Not on file   Tobacco Use    Smoking status: Never    Smokeless tobacco: Never   Vaping Use    Vaping Use: Never used   Substance and Sexual Activity    Alcohol use: Never    Drug use: Never    Sexual activity: Never   Other Topics Concern    Not on file   Social History Narrative    Not on file     Social Determinants of Health     Financial Resource Strain: Low Risk     Difficulty of Paying Living Expenses: Not hard at all   Food Insecurity: No Food Insecurity    Worried About Running Out of Food in the Last Year: Never true    [address] in the Last Year: Never true   Transportation Needs: No Transportation Needs    Lack of Transportation (Medical): No    Lack of Transportation (Non-Medical):  No   Physical Activity: Not on file   Stress: Not on file   Social Connections: Not on file   Intimate Partner Violence: Not on file   Housing Stability: Unknown    Unable to Pay for Housing in the Last Year: No    Number of Places Lived in the Last Year: Not on file    Unstable Housing in the Last Year: No       Vitals:    09/20/22 1031   BP: 138/80   Pulse: 78   Resp: 16   Temp: 97.8 °F (36.6 °C)   TempSrc: Temporal   SpO2: 99%   Weight: 145 lb (65.8 kg)   Height: 5' 8\" (1.727 m)       Physical Exam  Vitals and nursing note reviewed. Constitutional:       Appearance: He is well-developed. HENT:      Head: Atraumatic. Right Ear: External ear normal.      Left Ear: External ear normal.      Nose: Congestion present. Mouth/Throat:      Pharynx: Posterior oropharyngeal erythema present. No oropharyngeal exudate. Eyes:      Conjunctiva/sclera: Conjunctivae normal.      Pupils: Pupils are equal, round, and reactive to light. Neck:      Thyroid: No thyromegaly. Trachea: No tracheal deviation. Cardiovascular:      Rate and Rhythm: Normal rate and regular rhythm. Heart sounds: No murmur heard. No friction rub. No gallop. Pulmonary:      Effort: Pulmonary effort is normal. No respiratory distress. Breath sounds: Normal breath sounds. Abdominal:      General: Bowel sounds are normal.      Palpations: Abdomen is soft. Musculoskeletal:         General: No tenderness or deformity. Normal range of motion. Cervical back: Normal range of motion and neck supple. Lymphadenopathy:      Cervical: No cervical adenopathy. Skin:     General: Skin is warm and dry. Capillary Refill: Capillary refill takes less than 2 seconds. Findings: No rash. Neurological:      Mental Status: He is alert and oriented to person, place, and time. Sensory: No sensory deficit. Motor: No abnormal muscle tone.       Coordination: Coordination normal.      Deep Tendon Reflexes: Reflexes normal.           Seen By:  Mark Sparks DO

## 2022-09-22 ENCOUNTER — TELEPHONE (OUTPATIENT)
Dept: GASTROENTEROLOGY | Age: 22
End: 2022-09-22

## 2022-09-22 NOTE — TELEPHONE ENCOUNTER
Prior Authorization Form:      DEMOGRAPHICS:                     Patient Name:  Richard Tadeo  Patient :  2000            Insurance:  Payor: Marcelo Arce / Plan: [facility] [address] / Product Type: *No Product type* /   Insurance ID Number:    Payer/Plan Subscr  Sex Relation Sub. Ins. ID Effective Group Num   1.  Jarod 1959 Female Child [id number]                                    [id number]         DIAGNOSIS & PROCEDURE:                       Procedure/Operation: Colonoscopy           CPT Code: 30225    Diagnosis:  Crohn's disease of small intestine with rectal bleeding     ICD10 Code: Q20.964    Location:  [address] [place] [address]    Surgeon:  Dr. Fátima Mcelroy     [facility] INFORMATION:                          Date:     Time: 1300              Anesthesia:  MAC/TIVA                                                       Status:  Outpatient          Electronically signed by Kelsea Mcdonough MA on 2022 at 3:30 PM

## 2022-09-27 ENCOUNTER — HOSPITAL ENCOUNTER (OUTPATIENT)
Age: 22
Setting detail: SPECIMEN
Discharge: HOME | End: 2022-09-27
Payer: COMMERCIAL

## 2022-09-27 DIAGNOSIS — K50.111 CROHN'S DISEASE OF COLON WITH RECTAL BLEEDING (HCC): Primary | ICD-10-CM

## 2022-09-27 DIAGNOSIS — K50.111 CROHN'S DISEASE OF COLON WITH RECTAL BLEEDING (HCC): ICD-10-CM

## 2022-09-27 LAB
ALBUMIN SERPL-MCNC: 4.4 G/DL (ref 3.5–5.2)
ALP BLD-CCNC: 82 U/L (ref 40–129)
ALT SERPL-CCNC: 12 U/L (ref 0–40)
ANION GAP SERPL CALCULATED.3IONS-SCNC: 8 MMOL/L (ref 7–16)
AST SERPL-CCNC: 16 U/L (ref 0–39)
BILIRUB SERPL-MCNC: 0.3 MG/DL (ref 0–1.2)
BUN BLDV-MCNC: 11 MG/DL (ref 6–20)
C-REACTIVE PROTEIN: 0.3 MG/DL (ref 0–0.4)
CALCIUM SERPL-MCNC: 9 MG/DL (ref 8.6–10.2)
CHLORIDE BLD-SCNC: 101 MMOL/L (ref 98–107)
CO2: 27 MMOL/L (ref 22–29)
CREAT SERPL-MCNC: 1.1 MG/DL (ref 0.7–1.2)
GFR AFRICAN AMERICAN: >60
GFR NON-AFRICAN AMERICAN: >60 ML/MIN/1.73
GLUCOSE BLD-MCNC: 103 MG/DL (ref 74–99)
HCT VFR BLD CALC: 26.3 % (ref 37–54)
HEMOGLOBIN: 6.9 G/DL (ref 12.5–16.5)
MCH RBC QN AUTO: 17.2 PG (ref 26–35)
MCHC RBC AUTO-ENTMCNC: 26.2 % (ref 32–34.5)
MCV RBC AUTO: 65.6 FL (ref 80–99.9)
PDW BLD-RTO: 18.6 FL (ref 11.5–15)
PLATELET # BLD: 365 E9/L (ref 130–450)
PMV BLD AUTO: 10 FL (ref 7–12)
POTASSIUM SERPL-SCNC: 4.6 MMOL/L (ref 3.5–5)
RBC # BLD: 4.01 E12/L (ref 3.8–5.8)
SEDIMENTATION RATE, ERYTHROCYTE: 20 MM/HR (ref 0–15)
SODIUM BLD-SCNC: 136 MMOL/L (ref 132–146)
TOTAL PROTEIN: 7.8 G/DL (ref 6.4–8.3)
WBC # BLD: 7.9 E9/L (ref 4.5–11.5)

## 2022-09-27 PROCEDURE — 87449 NOS EACH ORGANISM AG IA: CPT

## 2022-09-27 PROCEDURE — 85651 RBC SED RATE NONAUTOMATED: CPT

## 2022-09-27 PROCEDURE — 80145 DRUG ASSAY ADALIMUMAB: CPT

## 2022-09-27 PROCEDURE — 80053 COMPREHEN METABOLIC PANEL: CPT

## 2022-09-27 PROCEDURE — 82728 ASSAY OF FERRITIN: CPT

## 2022-09-27 PROCEDURE — 86140 C-REACTIVE PROTEIN: CPT

## 2022-09-27 PROCEDURE — 36415 COLL VENOUS BLD VENIPUNCTURE: CPT

## 2022-09-27 PROCEDURE — 82397 CHEMILUMINESCENT ASSAY: CPT

## 2022-09-27 PROCEDURE — 85027 COMPLETE CBC AUTOMATED: CPT

## 2022-09-27 PROCEDURE — 87324 CLOSTRIDIUM AG IA: CPT

## 2022-09-27 PROCEDURE — 83540 ASSAY OF IRON: CPT

## 2022-09-27 PROCEDURE — 83993 ASSAY FOR CALPROTECTIN FECAL: CPT

## 2022-09-27 PROCEDURE — 87493 C DIFF AMPLIFIED PROBE: CPT

## 2022-09-27 PROCEDURE — 84443 ASSAY THYROID STIM HORMONE: CPT

## 2022-09-27 PROCEDURE — 83550 IRON BINDING TEST: CPT

## 2022-09-28 ENCOUNTER — HOSPITAL ENCOUNTER (OUTPATIENT)
Age: 22
Discharge: HOME | End: 2022-09-28
Payer: COMMERCIAL

## 2022-09-28 DIAGNOSIS — K50.111 CROHN'S DISEASE OF COLON WITH RECTAL BLEEDING (HCC): ICD-10-CM

## 2022-09-28 DIAGNOSIS — K50.111 CROHN'S DISEASE OF COLON WITH RECTAL BLEEDING (HCC): Primary | ICD-10-CM

## 2022-09-28 LAB
ABO/RH: NORMAL
ANTIBODY SCREEN: NORMAL
C DIFF TOXIN/ANTIGEN: NORMAL
C. DIFFICILE TOXIN MOLECULAR: ABNORMAL
FERRITIN: 3 NG/ML
IRON SATURATION: 5 % (ref 20–55)
IRON: 18 MCG/DL (ref 59–158)
ORGANISM: ABNORMAL
TOTAL IRON BINDING CAPACITY: 356 MCG/DL (ref 250–450)
TSH SERPL DL<=0.05 MIU/L-ACNC: 1.36 UIU/ML (ref 0.27–4.2)

## 2022-09-28 PROCEDURE — 86901 BLOOD TYPING SEROLOGIC RH(D): CPT

## 2022-09-28 PROCEDURE — P9016 RBC LEUKOCYTES REDUCED: HCPCS

## 2022-09-28 PROCEDURE — 86850 RBC ANTIBODY SCREEN: CPT

## 2022-09-28 PROCEDURE — 86900 BLOOD TYPING SEROLOGIC ABO: CPT

## 2022-09-28 PROCEDURE — 86923 COMPATIBILITY TEST ELECTRIC: CPT

## 2022-09-28 PROCEDURE — 36415 COLL VENOUS BLD VENIPUNCTURE: CPT

## 2022-09-28 RX ORDER — VANCOMYCIN HYDROCHLORIDE 250 MG/1
250 CAPSULE ORAL 4 TIMES DAILY
Qty: 40 CAPSULE | Refills: 0 | Status: SHIPPED | OUTPATIENT
Start: 2022-09-28 | End: 2022-10-08

## 2022-09-28 NOTE — PROGRESS NOTES
Called patient and informed him of cdiff result. Patient will start on vancomycin 250 mg four times a day  He will stop the prednisone.     Patient verbalized understanding

## 2022-09-29 RX ORDER — SODIUM CHLORIDE 9 MG/ML
INJECTION, SOLUTION INTRAVENOUS PRN
Status: DISCONTINUED | OUTPATIENT
Start: 2022-09-29 | End: 2022-10-01 | Stop reason: HOSPADM

## 2022-09-30 ENCOUNTER — HOSPITAL ENCOUNTER (OUTPATIENT)
Dept: INFUSION THERAPY | Age: 22
Setting detail: INFUSION SERIES
Discharge: HOME | End: 2022-09-30
Payer: COMMERCIAL

## 2022-09-30 VITALS
OXYGEN SATURATION: 100 % | RESPIRATION RATE: 12 BRPM | TEMPERATURE: 98.1 F | HEART RATE: 69 BPM | DIASTOLIC BLOOD PRESSURE: 65 MMHG | SYSTOLIC BLOOD PRESSURE: 116 MMHG

## 2022-09-30 LAB
BLOOD BANK DISPENSE STATUS: NORMAL
BLOOD BANK PRODUCT CODE: NORMAL
BPU ID: NORMAL
CALPROTECTIN, FECAL: 1530 UG/G
DESCRIPTION BLOOD BANK: NORMAL

## 2022-09-30 PROCEDURE — 2580000003 HC RX 258: Performed by: STUDENT IN AN ORGANIZED HEALTH CARE EDUCATION/TRAINING PROGRAM

## 2022-09-30 PROCEDURE — P9016 RBC LEUKOCYTES REDUCED: HCPCS

## 2022-09-30 PROCEDURE — 36430 TRANSFUSION BLD/BLD COMPNT: CPT

## 2022-09-30 RX ORDER — SODIUM CHLORIDE 9 MG/ML
INJECTION, SOLUTION INTRAVENOUS PRN
Status: COMPLETED | OUTPATIENT
Start: 2022-09-30 | End: 2022-09-30

## 2022-09-30 RX ORDER — SODIUM CHLORIDE 9 MG/ML
INJECTION, SOLUTION INTRAVENOUS PRN
Status: DISCONTINUED | OUTPATIENT
Start: 2022-09-30 | End: 2022-09-30

## 2022-09-30 RX ORDER — SODIUM CHLORIDE 0.9 % (FLUSH) 0.9 %
5-40 SYRINGE (ML) INJECTION PRN
Status: DISCONTINUED | OUTPATIENT
Start: 2022-09-30 | End: 2022-10-01 | Stop reason: HOSPADM

## 2022-09-30 RX ADMIN — SODIUM CHLORIDE 50 ML: 9 INJECTION, SOLUTION INTRAVENOUS at 10:54

## 2022-09-30 RX ADMIN — SODIUM CHLORIDE, PRESERVATIVE FREE 10 ML: 5 INJECTION INTRAVENOUS at 08:46

## 2022-09-30 NOTE — PROGRESS NOTES
Informed consent from Dr. Farhad Parkinson (his NP) verified and on patient chart. Patient received 1 unit RBC's. Patient tolerated well. Vital signs stable. Reviewed orally and provided with written information regarding potential delayed reaction and instructions on what to do if reaction occurs. Questions answered to apparent satisfaction. Patient observed for 30 minutes. No issues, d/c with his father in stable condition. Instructed patient of order to get repeat H/H in 6 hours per order. Patient understands.

## 2022-09-30 NOTE — PROGRESS NOTES
Informed consent from Dr. Altagracia Kenyon verified and on patient chart. Patient continues to agree to treatment of blood transfusion. Patient educated on signs and symptoms of transfusion reaction. Patient verbalizes understanding.

## 2022-10-01 ENCOUNTER — HOSPITAL ENCOUNTER (OUTPATIENT)
Age: 22
Discharge: HOME | End: 2022-10-01
Payer: COMMERCIAL

## 2022-10-01 LAB — HEMOGLOBIN: 8 G/DL (ref 12.5–16.5)

## 2022-10-01 PROCEDURE — 36415 COLL VENOUS BLD VENIPUNCTURE: CPT

## 2022-10-01 PROCEDURE — 85018 HEMOGLOBIN: CPT

## 2022-10-03 LAB
Lab: NORMAL
REPORT: NORMAL
THIS TEST SENT TO: NORMAL

## 2022-10-14 ENCOUNTER — OFFICE VISIT (OUTPATIENT)
Dept: GASTROENTEROLOGY | Age: 22
End: 2022-10-14
Payer: COMMERCIAL

## 2022-10-14 VITALS
OXYGEN SATURATION: 98 % | SYSTOLIC BLOOD PRESSURE: 118 MMHG | HEIGHT: 68 IN | DIASTOLIC BLOOD PRESSURE: 70 MMHG | WEIGHT: 153.1 LBS | RESPIRATION RATE: 18 BRPM | TEMPERATURE: 97.4 F | HEART RATE: 61 BPM | BODY MASS INDEX: 23.2 KG/M2

## 2022-10-14 DIAGNOSIS — D64.9 ANEMIA, UNSPECIFIED TYPE: ICD-10-CM

## 2022-10-14 DIAGNOSIS — D64.9 ANEMIA, UNSPECIFIED TYPE: Primary | ICD-10-CM

## 2022-10-14 DIAGNOSIS — K50.111 CROHN'S DISEASE OF COLON WITH RECTAL BLEEDING (HCC): ICD-10-CM

## 2022-10-14 LAB
ALBUMIN SERPL-MCNC: 4.6 G/DL (ref 3.5–5.2)
ALP BLD-CCNC: 80 U/L (ref 40–129)
ALT SERPL-CCNC: 12 U/L (ref 0–40)
ANION GAP SERPL CALCULATED.3IONS-SCNC: 11 MMOL/L (ref 7–16)
AST SERPL-CCNC: 22 U/L (ref 0–39)
BILIRUB SERPL-MCNC: 0.4 MG/DL (ref 0–1.2)
BILIRUBIN DIRECT: <0.2 MG/DL (ref 0–0.3)
BILIRUBIN, INDIRECT: NORMAL MG/DL (ref 0–1)
BUN BLDV-MCNC: 12 MG/DL (ref 6–20)
C-REACTIVE PROTEIN: 0.3 MG/DL (ref 0–0.4)
CALCIUM SERPL-MCNC: 9 MG/DL (ref 8.6–10.2)
CHLORIDE BLD-SCNC: 104 MMOL/L (ref 98–107)
CO2: 23 MMOL/L (ref 22–29)
CREAT SERPL-MCNC: 1 MG/DL (ref 0.7–1.2)
FERRITIN: 6 NG/ML
FOLATE: 18.9 NG/ML (ref 4.8–24.2)
GFR AFRICAN AMERICAN: >60
GFR NON-AFRICAN AMERICAN: >60 ML/MIN/1.73
GLUCOSE BLD-MCNC: 82 MG/DL (ref 74–99)
HCT VFR BLD CALC: 31.9 % (ref 37–54)
HEMOGLOBIN: 8.5 G/DL (ref 12.5–16.5)
IRON SATURATION: 5 % (ref 20–55)
IRON: 19 MCG/DL (ref 59–158)
MCH RBC QN AUTO: 18.1 PG (ref 26–35)
MCHC RBC AUTO-ENTMCNC: 26.6 % (ref 32–34.5)
MCV RBC AUTO: 67.9 FL (ref 80–99.9)
PDW BLD-RTO: 19.7 FL (ref 11.5–15)
PLATELET # BLD: 267 E9/L (ref 130–450)
PMV BLD AUTO: 10.3 FL (ref 7–12)
POTASSIUM SERPL-SCNC: 5.1 MMOL/L (ref 3.5–5)
RBC # BLD: 4.7 E12/L (ref 3.8–5.8)
SEDIMENTATION RATE, ERYTHROCYTE: 5 MM/HR (ref 0–15)
SODIUM BLD-SCNC: 138 MMOL/L (ref 132–146)
TOTAL IRON BINDING CAPACITY: 419 MCG/DL (ref 250–450)
TOTAL PROTEIN: 7.9 G/DL (ref 6.4–8.3)
TSH SERPL DL<=0.05 MIU/L-ACNC: 1.69 UIU/ML (ref 0.27–4.2)
VITAMIN B-12: 305 PG/ML (ref 211–946)
VITAMIN D 25-HYDROXY: 24 NG/ML (ref 30–100)
WBC # BLD: 3.7 E9/L (ref 4.5–11.5)

## 2022-10-14 PROCEDURE — 99212 OFFICE O/P EST SF 10 MIN: CPT | Performed by: NURSE PRACTITIONER

## 2022-10-14 RX ORDER — FERROUS SULFATE 325(65) MG
325 TABLET ORAL
Qty: 90 TABLET | Refills: 1 | Status: SHIPPED | OUTPATIENT
Start: 2022-10-14

## 2022-10-14 NOTE — PROGRESS NOTES
Ravi Soriano (:  2000) is a 19 y.o. male, here for evaluation of the following chief complaint(s):  Follow-up      SUBJECTIVE/OBJECTIVE:  HPI:    Mary Hall is a very pleasant 19year old gentleman with Crohn's disease    On week 8 of Humira injections    Patient had complained of loose bloody stools  Lab work showed anemia and stool culture noted cdff  Patient was given a blood transfusion along with treatment with vancomycin    Patient tells me today that he is feeling better  Repeat HGB is up to 8.0  The diarrhea has improved     ROS:  General: Patient denies n/v/f/c or weight loss. HEENT: Patient denies persistent postnasal drip, scleral icterus, drooling, persistent bleeding from nose/mouth. Resp: Patient denies SOB, wheezing, productive cough. Cards: Patient denies CP, palpitations, significant edema  GI: As above. Derm: Patient denies jaundice/rashes. Musc: Patient denies diffuse/irregular joint swelling or myalgias. Objective   Wt Readings from Last 3 Encounters:   10/14/22 153 lb 1.6 oz (69.4 kg)   22 145 lb (65.8 kg)   22 145 lb 6.4 oz (66 kg)     Temp Readings from Last 3 Encounters:   10/14/22 97.4 °F (36.3 °C) (Infrared)   22 98.1 °F (36.7 °C) (Temporal)   22 97.8 °F (36.6 °C) (Temporal)     BP Readings from Last 3 Encounters:   10/14/22 118/70   22 116/65   22 138/80     Pulse Readings from Last 3 Encounters:   10/14/22 61   22 69   22 78        Physical Exam  Constitutional:       Appearance: Normal appearance. Abdominal:      Palpations: Abdomen is soft. Neurological:      Mental Status: He is alert. Past Medical History:   Diagnosis Date    Allergic     Allergic rhinitis     Crohn's colitis ([facility] [address]     Hx of colonoscopy 2021    9/15/2021 colonoscopy Dr Gera Rich probable Crohn's disease involving a 2 cm circumferential segment in the proximal transverse colon, mild in intension.  Anorectal region from the anus at 20 cm with moderate granularity. Asacol HD 2 tablet TID, hydrocortisone suppositories in the evening for 2 weeks, small bowel follow through FU in 3 weeks    IBS (irritable bowel syndrome)       Past Surgical History:   Procedure Laterality Date    COLONOSCOPY N/A 01/04/2022    COLONOSCOPY WITH BIOPSY performed by Silke Kelly MD at [facility] N/A 06/14/2022    COLONOSCOPY WITH BIOPSY performed by Silke Kelly MD at [facility]  09/2021    Dr. Tatum Early History   Problem Relation Age of Onset    Other Mother         blood clots    No Known Problems Father     No Known Problems Sister     No Known Problems Brother         Lab Results   Component Value Date    WBC 3.7 (L) 10/14/2022    HGB 8.5 (L) 10/14/2022    HCT 31.9 (L) 10/14/2022    MCV 67.9 (L) 10/14/2022     10/14/2022      Lab Results   Component Value Date     10/14/2022    K 5.1 (H) 10/14/2022     10/14/2022    CO2 23 10/14/2022    BUN 12 10/14/2022    CREATININE 1.0 10/14/2022    GLUCOSE 82 10/14/2022    CALCIUM 9.0 10/14/2022    PROT 7.9 10/14/2022    LABALBU 4.6 10/14/2022    BILITOT 0.4 10/14/2022    ALKPHOS 80 10/14/2022    AST 22 10/14/2022    ALT 12 10/14/2022    LABGLOM >60 10/14/2022    GFRAA >60 10/14/2022                       ASSESSMENT/PLAN:    1. Anemia, unspecified type  -     CBC; Future  -     Vitamin D 25 Hydroxy; Future  -     Vitamin B12 & Folate; Future  -     Hepatic Function Panel; Future  -     C-Reactive Protein; Future  -     Sedimentation Rate; Future  -     Comprehensive Metabolic Panel; Future  2.  Crohn's disease of colon with rectal bleeding ([facility] [address]    -will have patient start on a daily iron supplement  -will obtain repeat lab work  -the diagnosis of IBD and CDIFF reviewed with patient today  -advised patient to call me asap if his symptoms return  -patient is scheduled for repeat colonoscopy in January      Return for Follow up in January or sooner as needed. An electronic signature was used to authenticate this note.     --Lamin Montanez, ASTER - CNP

## 2022-10-16 DIAGNOSIS — D64.9 ANEMIA, UNSPECIFIED TYPE: Primary | ICD-10-CM

## 2022-10-17 DIAGNOSIS — K50.111 CROHN'S DISEASE OF COLON WITH RECTAL BLEEDING (HCC): Primary | ICD-10-CM

## 2022-10-28 DIAGNOSIS — K50.111 CROHN'S DISEASE OF COLON WITH RECTAL BLEEDING (HCC): ICD-10-CM

## 2022-10-28 DIAGNOSIS — D64.9 ANEMIA, UNSPECIFIED TYPE: ICD-10-CM

## 2022-10-28 LAB
HCT VFR BLD CALC: 36.4 % (ref 37–54)
HEMOGLOBIN: 9.7 G/DL (ref 12.5–16.5)
POTASSIUM SERPL-SCNC: 4.2 MMOL/L (ref 3.5–5)
VITAMIN B-12: 293 PG/ML (ref 211–946)
VITAMIN D 25-HYDROXY: 25 NG/ML (ref 30–100)

## 2022-10-28 PROCEDURE — 36415 COLL VENOUS BLD VENIPUNCTURE: CPT | Performed by: NURSE PRACTITIONER

## 2022-10-31 DIAGNOSIS — D64.9 ANEMIA, UNSPECIFIED TYPE: Primary | ICD-10-CM

## 2022-10-31 DIAGNOSIS — K50.111 CROHN'S DISEASE OF COLON WITH RECTAL BLEEDING (HCC): ICD-10-CM

## 2022-11-09 ENCOUNTER — TELEPHONE (OUTPATIENT)
Dept: INTERNAL MEDICINE | Age: 22
End: 2022-11-09

## 2022-11-09 RX ORDER — ERGOCALCIFEROL 1.25 MG/1
50000 CAPSULE ORAL SEE ADMIN INSTRUCTIONS
Qty: 8 CAPSULE | Refills: 1 | Status: SHIPPED | OUTPATIENT
Start: 2022-11-09

## 2022-11-09 NOTE — TELEPHONE ENCOUNTER
Specialty Medication Service    Date: 11/9/2022  Patient's Name: Sade Mendez YOB: 2000            _____________________________________________________________________________________________    Left message to schedule PharmD follow up appointment for Specialty Medication Services. Please call: [phone] option 4. Will continue to outreach as appropriate.     Ailyn Parnell PharmD, [facility]  Ambulatory Clinical Pharmacist   Specialty Medication Service/[facility]  Phone: [phone]  [facility]  Phone: [phone]

## 2022-11-11 NOTE — TELEPHONE ENCOUNTER
Specialty Medication Service    Date: 11/11/2022  Patient's Name: Maricruz Gregorio YOB: 2000            _____________________________________________________________________________________________    Left message to schedule PharmD follow up appointment for Specialty Medication Services. Please call: [phone] option 4. Will continue to outreach as appropriate.     Mathew Richardson, MarkD, [facility]  Ambulatory Clinical Pharmacist   [address] [place] [address],[address] Specialty Medication Service  Phone: [phone]  [facility]  Phone: [phone]

## 2022-11-14 ENCOUNTER — HOSPITAL ENCOUNTER (OUTPATIENT)
Age: 22
Setting detail: SPECIMEN
Discharge: HOME | End: 2022-11-14

## 2022-11-14 NOTE — TELEPHONE ENCOUNTER
Specialty Medication Service    Date: 11/14/2022  Patient's Name: Papi Ledezma YOB: 2000            _____________________________________________________________________________________________    Left message to schedule PharmD follow up appointment for Specialty Medication Services. Please call: [phone] option 4. Will continue to outreach as appropriate.     Imelda Haas, PharmD, [address] Specialty Medication Service  Phone: [phone]  [address] [place] [address]  Phone: [phone]    For [facility] in place:  No  Recommendation Provided To: Patient/Caregiver: 1 via Telephone and Shotshart Message  Intervention Detail: Scheduled Appointment   Intervention Accepted By: Patient/Caregiver: 0  Time Spent (min): 15

## 2022-11-15 LAB
REASON FOR REJECTION: NORMAL
REJECTED TEST: NORMAL

## 2022-11-16 RX ORDER — VANCOMYCIN HYDROCHLORIDE 125 MG/1
125 CAPSULE ORAL 4 TIMES DAILY
Qty: 40 CAPSULE | Refills: 0 | Status: SHIPPED | OUTPATIENT
Start: 2022-11-16 | End: 2022-11-26

## 2022-11-16 NOTE — TELEPHONE ENCOUNTER
Specialty Medication Service    Date: 11/16/2022  Patient's Name: Sade Mendez YOB: 2000            _____________________________________________________________________________________________    Patient returned call  to schedule PharmD follow up appointment for Specialty Medication Services. Appointment scheduled for 9:00 AM on 11/18/2022.      Lynne Vazquez PharmD  Ambulatory Clinical Pharmacist   Specialty Medication Services   Phone: [phone] option 4  11/16/2022 9:24 AM    For Pharmacy Admin Tracking Only    CPA in place:  No  Recommendation Provided To: Patient/Caregiver: 1 via Telephone  Intervention Detail: Scheduled Appointment  Intervention Accepted By: Patient/Caregiver: 1  Time Spent (min): 10

## 2022-11-18 ENCOUNTER — PHARMACY VISIT (OUTPATIENT)
Dept: INTERNAL MEDICINE | Age: 22
End: 2022-11-18

## 2022-11-18 DIAGNOSIS — K50.111 CROHN'S DISEASE OF COLON WITH RECTAL BLEEDING (HCC): ICD-10-CM

## 2022-11-18 PROCEDURE — 99999 PR OFFICE/OUTPT VISIT,PROCEDURE ONLY: CPT | Performed by: PHARMACIST

## 2022-11-18 RX ORDER — ADALIMUMAB 40MG/0.4ML
40 KIT SUBCUTANEOUS
Qty: 2 EACH | Refills: 6 | Status: SHIPPED | OUTPATIENT
Start: 2022-11-18

## 2022-11-18 ASSESSMENT — PATIENT HEALTH QUESTIONNAIRE - PHQ9
SUM OF ALL RESPONSES TO PHQ QUESTIONS 1-9: 0
2. FEELING DOWN, DEPRESSED OR HOPELESS: 0
SUM OF ALL RESPONSES TO PHQ QUESTIONS 1-9: 0
SUM OF ALL RESPONSES TO PHQ9 QUESTIONS 1 & 2: 0
1. LITTLE INTEREST OR PLEASURE IN DOING THINGS: 0

## 2022-11-18 NOTE — PROGRESS NOTES
Specialty Medication Service    Patient's Name: Josr Otoole YOB: 2000            Reason for visit: Josr Otoole is a 19 y.o. male presenting today for Specialty Medication Service visit follow up. Patient last seen by [facility] 9/19/2022. Patient continues on SMS formulary medication, Humira. Pharmacy completed Specialty Medication Service visit for medication monitoring and counseling. Medication list updated. Specialty Medication: HUMIRA 40MG/0.4ML PNKT   Frequency: Every 14 days  Indication: Crohn's  Initially Diagnosed: 2021  Additional Therapy:   Mesalamine  Previous Therapy:    None     Specialist:   Reed Arevalo[address], [facility]  [phone]    Specialist Progress Note Available: Yes  Last Specialist Visit: 10/14/2022 - patient experiencing anemia, received transfusion and started on daily iron; continue meds and follow up in January    Allergies   Allergen Reactions    Cattle Epithelium Extract Allergy Skin Test Other (See Comments)     On allergy test    Cat Hair Extract Other (See Comments)     sneezing    Dog Epithelium Allergy Skin Test Other (See Comments)     sneezing    Mixed Ragweed Itching    Seasonal Other (See Comments)     Runny nose,sneezing,coughing       Past Medical History:   Diagnosis Date    Allergic     Allergic rhinitis     Crohn's colitis ([facility]     Hx of colonoscopy 09/27/2021    9/15/2021 colonoscopy Dr Troy Mosher probable Crohn's disease involving a 2 cm circumferential segment in the proximal transverse colon, mild in intension. Anorectal region from the anus at 20 cm with moderate granularity.  Asacol HD 2 tablet TID, hydrocortisone suppositories in the evening for 2 weeks, small bowel follow through FU in 3 weeks    IBS (irritable bowel syndrome)       Social History     Tobacco Use    Smoking status: Never     Passive exposure: Never    Smokeless tobacco: Never   Substance Use Topics    Alcohol use: Never     Family History   Problem Relation Age of Onset    Other Mother         blood clots    No Known Problems Father     No Known Problems Sister     No Known Problems Brother      INTERM HISTORY  Have you been diagnosed with any additional conditions since we last talked? yes, c diff  Have you developed any new allergies since we last talked? no  Have you stopped taking any medications or supplements since we last talked? no  Have you started taking any additional medications or supplements since we last talked? yes, vancomycin PO    REVIEW OF CURRENT DISEASE STATE  Inflammatory Bowel Disease: Patient with diagnosis of Crohn's Disease. Disease has involved transverse colon and rectum. The patient has had no surgery for treatment of their IBD. The patient is currently having 4 bowel movements per day which are semisolid. The patient does not have fever. The patient does not have weight loss. The patient does not have extraintestinal symptoms. · Are you currently having a flare? no  · Considering all the ways in which this condition and others affects you, how are you doing (0 = very well, 10 = very poorly)? 1  · How would you rate your pain on average? (0 = no pain, 10 = worst pain imaginable) 1  · During the past 4 weeks, have you missed any planned activities of daily living due to condition (work/school/other plans)? No  · During the past 4 weeks, have you had to seek urgent care, ER admission, Unplanned doctor office visit, or hospital admission?  No    MEDICATIONS  Current Outpatient Medications   Medication Sig Dispense Refill    vancomycin (VANCOCIN) 125 MG capsule Take 1 capsule by mouth 4 times daily for 10 days 40 capsule 0    vitamin D (ERGOCALCIFEROL) 1.25 MG (91881 UT) CAPS capsule Take 1 capsule by mouth See Admin Instructions Take once capsule by mouth twice weekly 8 capsule 1    ferrous sulfate (IRON 325) 325 (65 Fe) MG tablet Take 1 tablet by mouth daily (with breakfast) 90 tablet 1    Adalimumab (HUMIRA PEN) 40 MG/0.4ML PNKT Inject 40 mg into the skin every 14 days 2 each 6    mesalamine (CANASA) 1000 MG suppository Place 1 suppository rectally nightly 60 suppository 3    mesalamine (LIALDA) 1.2 g EC tablet Take 4 tablets by mouth daily (with breakfast) 120 tablet 5    dicyclomine (BENTYL) 20 MG tablet Take 1 tablet by mouth 4 times daily as needed (abdominal pain, bloating) 60 tablet 5    famotidine (PEPCID) 40 MG tablet Take 0.5 tablets by mouth 2 times daily (Patient taking differently: Take 20 mg by mouth 2 times daily Takes as needed) 30 tablet 3     No current facility-administered medications for this visit. Current Specialty Medication:   Adalimumab (Humira)   Crohn Disease: 160 mg on week 0, then 80 mg on week 2, then 40 mg every other week; Note some patients require 40 mg every week as maintenance therapy  Warnings to monitor for: Anaphylaxis/hypersensitivity reactions, Positive antinuclear antibody titers (even with negative baseline), Demyelinating CNS disease (new-onset or exacerbation), Heart failure (Worsening or new-onset), pancytopenia and aplastic anemia, reactivation of hepatitis B (HBV), Infections: [US Boxed Warning], Malignancy: [US Boxed Warning], Tuberculosis: [US Boxed Warning]  Recommended monitoring: Monitor improvement of symptoms and physical function assessments, signs/symptoms/worsening of heart failure; TB screening (every 6-12 months dependent upon length of therapy and other risk factors), CBC with differential (every 6 weeks for 3 months then annually); LFTs (Annually); HBV screening (annual); signs/symptoms of malignancy (eg, splenomegaly, hepatomegaly, abdominal pain, persistent fever, night sweats, weight loss). Storage: Refrigerated at 36°F to 46°F (2°C to 8°C). DO NOT FREEZE. Do not use if frozen even if it has been thawed. Handling: May store at room temperature (If needed) for up to 14 days with protection from light. Discard if not used within the 14-day period.  (keep track by writing down date removed from refrigerator). Do not use beyond the expiration date on the container. Patient Reported Side Effects: none    Specialty Medication Start Date: 8/2022  Appropriate Dose: Yes  Last tuberculin Test: 8/4/2022   - Result: negative    Describe your medication adherence over the last 4 weeks: Very Good  How many doses have you missed in the last 4 weeks, if any? 0  How confident are you to follow the injection process and the treatment plan? (0-10) 9 Who injects? self  Does the patient have a current infection of any kind? Yes, c diff    Contraindications to therapy present? No    Drug Interactions:  No clinically significant interactions identified via [employer] Display Interaction Analysis as category D or higher.  _____________________________________________________________________  Renal Dosing:  Creatinine Clearance: CrCl cannot be calculated (Unknown ideal weight.). No renal adjustments necessary.     IMMUNIZATIONS  Immunization History   Administered Date(s) Administered    COVID-19, PFIZER PURPLE top, DILUTE for use, (age 15 y+), 30mcg/0.3mL 02/26/2021, 03/19/2021, 11/04/2021    DTaP vaccine 03/02/1999, 05/14/1999, 07/16/1999, 09/08/2000, 04/23/2004    HPV Quadrivalent (Gardasil) 11/09/2011, 01/18/2012, 05/22/2012    Hep B/Hib (Comvax) 03/02/1999, 05/14/1999, 01/28/2000    Hepatitis A Ped/Adol (Havrix, Vaqta) 03/12/2013, 07/27/2016    Influenza A (T7Y7-52) Vaccine PF IM 11/18/2009    Influenza Virus Vaccine 10/23/2009, 11/09/2011, 09/05/2021    Influenza Whole 11/21/2008    Influenza, AFLURIA (age 1 yrs+), FLUZONE, (age 10 mo+), MDV, 0.5mL 10/23/2009, 10/26/2010, 11/09/2011, 12/19/2019    Influenza, FLUARIX, FLULAVAL, FLUZONE (age 10 mo+) AND AFLURIA, (age 1 y+), PF, 0.5mL 11/22/2017, 01/13/2019    Influenza, FLUCELVAX, (age 10 mo+), MDCK, PF, 0.5mL 10/22/2020    MMR 01/28/2000, 04/23/2001    Meningococcal MCV4P (Menactra) 11/09/2011, 07/27/2016    Pneumococcal Conjugate 7-valent (Luis Haddad) 09/29/2000, 01/12/2001    Polio IPV (IPOL) 03/02/1999, 05/14/1999, 04/18/2000, 04/23/2001    Tdap (Boostrix, Adacel) 11/09/2011    Varicella (Varivax) 04/18/2000, 11/09/2011      Immunization status: up to date and documented, missing doses of COVID and PNA. LABS  Lab Results   Component Value Date/Time    BUN 12 10/14/2022 10:51 AM    CREATININE 1.0 10/14/2022 10:51 AM     Lab Results   Component Value Date/Time    WBC 3.7 10/14/2022 10:51 AM    HGB 9.7 10/28/2022 09:40 AM    HCT 36.4 10/28/2022 09:40 AM    RBC 4.70 10/14/2022 10:51 AM     10/14/2022 10:51 AM     Lab Results   Component Value Date/Time    ALKPHOS 80 10/14/2022 10:51 AM    ALT 12 10/14/2022 10:51 AM    AST 22 10/14/2022 10:51 AM    BILITOT 0.4 10/14/2022 10:51 AM    BILIDIR <0.2 10/14/2022 10:51 AM    IBILI see below 10/14/2022 10:51 AM     ASSESSMENTS  No flowsheet data found. No flowsheet data found. Crohn's Disease  Isrrael Mackey is a 19 y.o. male being treated for Crohn's Disease. Medication Reconciliation completed (information obtained from patient), no drug-drug interactions identified. Allergy and diagnosis info reviewed and updated. Isrrael Mackey has a history remarkable for the following conditions: c diff, Crohn's, vitamin D deficiency  Current therapy includes: Humira + mesalamine   Medication Effectiveness: Patient disease is  well controlled on current therapy. Patient had no questions regarding the medication's warnings, precautions, and contraindications. Confirmed appropriate storage and disposal.  Patient had no concerns with the administration process. Current disease state symptoms include: patient stated loose stools and blood in stools are likely due to active c diff infection  No side effects/adverse events reported, and no adherence issues identified. Functional and cognitive limitations include: none  Patient is not considered high risk.  Based on patient feedback/results of the assessment, the therapy is still appropriate. No medication-related problem(s) or patient need(s) identified that would require a care plan. Follow up in 180 days      Immunizations  Immunization status: up to date and documented, missing doses of PNA and COVID. Patient stated that he is in the process of getting COVID booster and he is agreeable to PNA vaccine. Drug Interactions  None    Other Identified Potential Issues  Discussed with patient the Pharmacist Collaborative Practice Agreement. Patient provided verbal and/or electronic (ex. [employer]) consent to participate in the collaborative practice agreement between the pharmacist and referred patient. This is in lieu of paper consent due to COVID-19 precautions and the use of remote/virtual visits. Patient to provide written consent in addition to verbal consent. Patient has active c diff infection. Patient is being treated for infection. No further outreach at this time  Patient's anemia is now being treated and is seeing increase in hemaglobin    PLAN  Goals of therapy, common side effects, medication storage, and administration reviewed with patient. continue Humira 40 mg every 14 days as prescribed   Recommended lab monitoring: none at this time  Recommended vaccinations: PNA, COVID  Keep all scheduled appointments. Return to clinic in 6 month(s). or call with any questions or concerns. Ca Ruvalcaba, PharmD, [facility]  Ambulatory Clinical Pharmacist   [facility] Specialty Medication Service  Phone: [phone]  [address] Family Medicine  Phone: [phone]    For [address] in place:  Yes  Recommendation Provided To: Patient/Caregiver: 3 via Virtual Visit  Intervention Detail: Refill(s) Provided, Scheduled Appointment, and Vaccine Recommended/Administered   Intervention Accepted By: Patient/Caregiver: 2  Time Spent (min): [address] was evaluated through a synchronous (real-time) audio encounter.  Patient identification was verified at the start of the visit. He (or guardian if applicable) is aware that this is a billable service, which includes applicable co-pays. This visit was conducted with the patient's (and/or legal guardian's) verbal consent. He has not had a related appointment within my department in the past 7 days or scheduled within the next 24 hours. The patient was located at Home: [address] [place] [address]. The provider was located at Home ([address]: [place].     Note: not billable if this call serves to triage the patient into an appointment for the relevant concern

## 2022-12-05 DIAGNOSIS — D64.9 ANEMIA, UNSPECIFIED TYPE: Primary | ICD-10-CM

## 2022-12-06 DIAGNOSIS — K50.111 CROHN'S DISEASE OF COLON WITH RECTAL BLEEDING (HCC): ICD-10-CM

## 2022-12-06 DIAGNOSIS — D64.9 ANEMIA, UNSPECIFIED TYPE: ICD-10-CM

## 2022-12-06 LAB
IRON SATURATION: 37 % (ref 20–55)
IRON: 130 MCG/DL (ref 59–158)
TOTAL IRON BINDING CAPACITY: 356 MCG/DL (ref 250–450)
VITAMIN D 25-HYDROXY: 47 NG/ML (ref 30–100)

## 2023-01-02 ENCOUNTER — HOSPITAL ENCOUNTER (EMERGENCY)
Age: 23
Discharge: HOME | End: 2023-01-02
Payer: COMMERCIAL

## 2023-01-02 VITALS
WEIGHT: 150 LBS | BODY MASS INDEX: 22.22 KG/M2 | DIASTOLIC BLOOD PRESSURE: 75 MMHG | OXYGEN SATURATION: 98 % | SYSTOLIC BLOOD PRESSURE: 113 MMHG | HEART RATE: 69 BPM | HEIGHT: 69 IN | RESPIRATION RATE: 16 BRPM | TEMPERATURE: 99.2 F

## 2023-01-02 DIAGNOSIS — J06.9 ACUTE UPPER RESPIRATORY INFECTION: Primary | ICD-10-CM

## 2023-01-02 PROCEDURE — 99211 OFF/OP EST MAY X REQ PHY/QHP: CPT

## 2023-01-02 RX ORDER — GUAIFENESIN 600 MG/1
600 TABLET, EXTENDED RELEASE ORAL 2 TIMES DAILY
Qty: 30 TABLET | Refills: 0 | Status: SHIPPED | OUTPATIENT
Start: 2023-01-02 | End: 2023-01-17

## 2023-01-02 RX ORDER — BENZONATATE 100 MG/1
100 CAPSULE ORAL 3 TIMES DAILY PRN
Qty: 30 CAPSULE | Refills: 0 | Status: SHIPPED | OUTPATIENT
Start: 2023-01-02 | End: 2023-01-09

## 2023-01-02 RX ORDER — BENZONATATE 100 MG/1
100 CAPSULE ORAL 3 TIMES DAILY PRN
Qty: 30 CAPSULE | Refills: 0 | Status: SHIPPED | OUTPATIENT
Start: 2023-01-02 | End: 2023-01-02 | Stop reason: SDUPTHER

## 2023-01-02 RX ORDER — GUAIFENESIN 600 MG/1
600 TABLET, EXTENDED RELEASE ORAL 2 TIMES DAILY
Qty: 30 TABLET | Refills: 0 | Status: SHIPPED | OUTPATIENT
Start: 2023-01-02 | End: 2023-01-02 | Stop reason: SDUPTHER

## 2023-01-02 ASSESSMENT — PAIN SCALES - GENERAL: PAINLEVEL_OUTOF10: 0

## 2023-01-02 ASSESSMENT — PAIN - FUNCTIONAL ASSESSMENT: PAIN_FUNCTIONAL_ASSESSMENT: NONE - DENIES PAIN

## 2023-01-02 NOTE — ED PROVIDER NOTES
Department of Emergency Medicine   ED  Provider Note  Admit Date/RoomTime: 1/2/2023  9:16 AM  ED Room: 04/04 1/2/23  9:45 AM EST      HPI: Helen Milian is a 23 y.o. male with a past medical history of  has a past medical history of Allergic, Allergic rhinitis, [facility] Hx of colonoscopy, and IBS (irritable bowel syndrome). presenting with complaints of a cough with nasal congestion. The patient states that these symptoms began gradually about 5 days ago. The history is obtained from the patient. Patient does complain of a mild cough associated with it that is nonproductive. Patient denies excessive fatigue or sleeping greater than 18 hours a day. Patient denies exposure to mononucleosis. The patient denies any abdominal pain, left upper quadrant fullness, or early satiety. The patient also denies difficulty breathing, hemoptysis, neck pain/stiffness, or blurry vision, or chest pain. Sx have persisted and are mildly worse which is what prompted the visit today. Does report exposure to sick contacts as his sister was recently ill with similar symptoms. Review of Systems:   A complete review of systems was performed and pertinent positives and negatives are stated within HPI, all other systems reviewed and are negative.        --------------------------------------------- PAST HISTORY ---------------------------------------------  Past Medical History:  has a past medical history of Allergic, Allergic rhinitis, [facility] Hx of colonoscopy, and IBS (irritable bowel syndrome). Past Surgical History:  has a past surgical history that includes Tonsillectomy; [place] tooth extraction; Colonoscopy (N/A, 01/04/2022); Colonoscopy (N/A, 06/14/2022); and Colonoscopy (09/2021). Social History:  reports that he has never smoked. He has never been exposed to tobacco smoke.  He has never used smokeless tobacco. He reports that he does not drink alcohol and does not use drugs. Family History: family history includes No Known Problems in his brother, father, and sister; Other in his mother. The patients home medications have been reviewed. Allergies: Cattle epithelium extract allergy skin test, Cat hair extract, Dog epithelium allergy skin test, Mixed ragweed, and Seasonal          Physical exam:  Constitutional: Vital signs are reviewed the patient is comfortable. The patient is alert and oriented and conversant. Head: The head is atraumatic and normocephalic. Eyes: No discharge is present from the eyes. The sclera are normal.  Ears: TMs bilaterally demonstrate no erythema, no bulging and no evidence of infection. Mouth: The oropharynx demonstrates a small amount of erythema bilaterally. There is no tonsillar enlargement nor is there any exudate present. No uvular deviation or edema. No tonsillary asymmetry. Floor of the mouth soft, no trismus, handling secretions. Neck: Normal range of motion is achieved in the neck. There is no JVD present. No meningeal signs are present   Anterior cervical adenopathy is absent. Respiratory: The chest is nontender. Breath sounds are clear to auscultation bilaterally. No wheezes, rales, or rhonchi. There is no respiratory distress. Cardiovascular: Heart shows a regular rate and rhythm without murmurs, rubs, clicks or gallops. Abdominal exam: The abdomen is non tender without evidence of peritoneal signs. Specific attention to the left upper quadrant with palpation of the spleen demonstrates no organomegaly or tenderness  Skin: warm and dry, without rash  Neurologic: GCS 15   Psych: Normal Affect  -------------------------------------------------- RESULTS -------------------------------------------------    LABS:  No results found for this visit on 01/02/23.     RADIOLOGY:  Interpreted by Radiologist.  No orders to display             ------------------------- NURSING NOTES AND VITALS REVIEWED ---------------------------   The nursing notes within the ED encounter and vital signs as below have been reviewed. /75   Pulse 69   Temp 99.2 °F (37.3 °C) (Infrared)   Resp 16   Ht 5' 8.5\" (1.74 m)   Wt 150 lb (68 kg)   SpO2 98%   BMI 22.48 kg/m²   Oxygen Saturation Interpretation: Normal    The patients available past medical records and past encounters were reviewed. ------------------------------ ED COURSE/MEDICAL DECISION MAKING----------------------  Medications - No data to display          Medical Decision Making:         Upper respiratory infection likely viral in etiology. Not hypoxic, nothing to suggest pneumonia. Well appearing, non toxic, appropriate for outpatient management. Plan is for symptom management and PCP follow up. This patient's ED course included: a personal history and physicial eaxmination and re-evaluation prior to disposition    This patient has remained hemodynamically stable during their ED course. Counseling: The emergency provider has spoken with the patient and discussed todays results, in addition to providing specific details for the plan of care and counseling regarding the diagnosis and prognosis. Questions are answered at this time and they are agreeable with the plan.       --------------------------------- IMPRESSION AND DISPOSITION ---------------------------------    IMPRESSION  1.  Acute upper respiratory infection        DISPOSITION  Disposition: Discharge to home  Patient condition is good            Lenore Melendrez [facility]  01/02/23 [id number]

## 2023-01-05 RX ORDER — SODIUM, POTASSIUM,MAG SULFATES 17.5-3.13G
1 SOLUTION, RECONSTITUTED, ORAL ORAL ONCE
Qty: 1 EACH | Refills: 0 | Status: SHIPPED | OUTPATIENT
Start: 2023-01-05 | End: 2023-01-05

## 2023-01-09 ENCOUNTER — ANESTHESIA EVENT (OUTPATIENT)
Dept: ENDOSCOPY | Age: 23
End: 2023-01-09
Payer: COMMERCIAL

## 2023-01-09 NOTE — PROGRESS NOTES
[id number] [facility]                                                                                                                    PRE OP INSTRUCTIONS FOR  [place]        Date: 1/9/2023    Date of surgery: 1/10/2023   Arrival Time: Hospital will call you between 5pm and 7pm with your final arrival time for surgery    Nothing by mouth (NPO) as instructed.____midnight________________________________________________________________    Take the following medications with a small sip of water on the morning of Surgery:      Diabetics may take evening dose of insulin but none after midnight. If you feel symptomatic or low blood sugar morning of surgery drink 1-2 ounces of apple juice only. Aspirin, Ibuprofen, Advil, Naproxen, Vitamin E and other Anti-inflammatory products should be stopped  before surgery  as directed by your physician. Take Tylenol only unless instructed otherwise by your surgeon. Check with your Doctor regarding stopping Plavix, Coumadin, Lovenox, Eliquis, Effient, or other blood thinners. Do not smoke,use illicit drugs and do not drink any alcoholic beverages 24 hours prior to surgery. You may brush your teeth the morning of surgery. DO NOT SWALLOW WATER    You MUST make arrangements for a responsible adult to take you home after your surgery. You will not be allowed to leave alone or drive yourself home. It is strongly suggested someone stay with you the first 24 hrs. Your surgery will be cancelled if you do not have a ride home. PEDIATRIC PATIENTS ONLY:  A parent/legal guardian must accompany a child scheduled for surgery and plan to stay at the hospital until the child is discharged. Please do not bring other children with you. Please wear simple, loose fitting clothing to the hospital.  [place] not bring valuables (money, credit cards, checkbooks, etc.) Do not wear any makeup (including no eye makeup) or nail polish on your fingers or toes.     DO NOT wear any jewelry or piercings on day of surgery. All body piercing jewelry must be removed. Shower the night before surgery with _x__Antibacterial soap /LEVI WIPES________    TOTAL JOINT REPLACEMENT/HYSTERECTOMY PATIENTS ONLY---Remember to bring Blood Bank bracelet to the hospital on the day of surgery. If you have a Living Will and Durable Power of  for Healthcare, please bring in a copy. If appropriate bring crutches, inspirex, WALKER, CANE etc... Notify your Surgeon if you develop any illness between now and surgery time, cough, cold, fever, sore throat, nausea, vomiting, etc.  Please notify your surgeon if you experience dizziness, shortness of breath or blurred vision between now & the time of your surgery. If you have ___dentures, they will be removed before going to the OR; we will provide you a container. If you wear ___contact lenses or ___glasses, they will be removed; please bring a case for them. To provide excellent care visitors will be limited to 2 in the room at any given time. Please bring picture ID and insurance card. During flu season no children under the age of 15 are permitted in the hospital for the safety of all patients. Other                  Please call AMBULATORY CARE if you have any further questions.    [address] [facility]     [address] [place] Pregnancy And Lactation Text: This medication is Pregnancy Category X and is absolutely contraindicated during pregnancy. It is unknown if it is excreted in breast milk.

## 2023-01-10 ENCOUNTER — ANESTHESIA (OUTPATIENT)
Dept: ENDOSCOPY | Age: 23
End: 2023-01-10
Payer: COMMERCIAL

## 2023-01-10 ENCOUNTER — HOSPITAL ENCOUNTER (OUTPATIENT)
Age: 23
Setting detail: OUTPATIENT SURGERY
Discharge: HOME | End: 2023-01-10
Attending: STUDENT IN AN ORGANIZED HEALTH CARE EDUCATION/TRAINING PROGRAM | Admitting: STUDENT IN AN ORGANIZED HEALTH CARE EDUCATION/TRAINING PROGRAM
Payer: COMMERCIAL

## 2023-01-10 VITALS
SYSTOLIC BLOOD PRESSURE: 133 MMHG | DIASTOLIC BLOOD PRESSURE: 62 MMHG | RESPIRATION RATE: 16 BRPM | WEIGHT: 155 LBS | HEIGHT: 69 IN | BODY MASS INDEX: 22.96 KG/M2 | TEMPERATURE: 97.8 F | OXYGEN SATURATION: 100 % | HEART RATE: 62 BPM

## 2023-01-10 DIAGNOSIS — K50.111: ICD-10-CM

## 2023-01-10 PROCEDURE — 7100000010 HC PHASE II RECOVERY - FIRST 15 MIN: Performed by: STUDENT IN AN ORGANIZED HEALTH CARE EDUCATION/TRAINING PROGRAM

## 2023-01-10 PROCEDURE — 88305 TISSUE EXAM BY PATHOLOGIST: CPT

## 2023-01-10 PROCEDURE — 2500000003 HC RX 250 WO HCPCS

## 2023-01-10 PROCEDURE — 45380 COLONOSCOPY AND BIOPSY: CPT | Performed by: STUDENT IN AN ORGANIZED HEALTH CARE EDUCATION/TRAINING PROGRAM

## 2023-01-10 PROCEDURE — 7100000011 HC PHASE II RECOVERY - ADDTL 15 MIN: Performed by: STUDENT IN AN ORGANIZED HEALTH CARE EDUCATION/TRAINING PROGRAM

## 2023-01-10 PROCEDURE — 3700000000 HC ANESTHESIA ATTENDED CARE: Performed by: STUDENT IN AN ORGANIZED HEALTH CARE EDUCATION/TRAINING PROGRAM

## 2023-01-10 PROCEDURE — 6360000002 HC RX W HCPCS

## 2023-01-10 PROCEDURE — 2580000003 HC RX 258: Performed by: ANESTHESIOLOGY

## 2023-01-10 PROCEDURE — 2709999900 HC NON-CHARGEABLE SUPPLY: Performed by: STUDENT IN AN ORGANIZED HEALTH CARE EDUCATION/TRAINING PROGRAM

## 2023-01-10 PROCEDURE — 3609010600 HC COLONOSCOPY POLYPECTOMY SNARE/COLD BIOPSY: Performed by: STUDENT IN AN ORGANIZED HEALTH CARE EDUCATION/TRAINING PROGRAM

## 2023-01-10 PROCEDURE — 3700000001 HC ADD 15 MINUTES (ANESTHESIA): Performed by: STUDENT IN AN ORGANIZED HEALTH CARE EDUCATION/TRAINING PROGRAM

## 2023-01-10 RX ORDER — SODIUM CHLORIDE 0.9 % (FLUSH) 0.9 %
5-40 SYRINGE (ML) INJECTION PRN
Status: DISCONTINUED | OUTPATIENT
Start: 2023-01-10 | End: 2023-01-10 | Stop reason: HOSPADM

## 2023-01-10 RX ORDER — FENTANYL CITRATE 50 UG/ML
INJECTION, SOLUTION INTRAMUSCULAR; INTRAVENOUS PRN
Status: DISCONTINUED | OUTPATIENT
Start: 2023-01-10 | End: 2023-01-10 | Stop reason: SDUPTHER

## 2023-01-10 RX ORDER — PROPOFOL 10 MG/ML
INJECTION, EMULSION INTRAVENOUS CONTINUOUS PRN
Status: DISCONTINUED | OUTPATIENT
Start: 2023-01-10 | End: 2023-01-10 | Stop reason: SDUPTHER

## 2023-01-10 RX ORDER — ONDANSETRON 4 MG/1
4 TABLET, ORALLY DISINTEGRATING ORAL EVERY 8 HOURS PRN
Status: CANCELLED | OUTPATIENT
Start: 2023-01-10

## 2023-01-10 RX ORDER — SODIUM CHLORIDE 0.9 % (FLUSH) 0.9 %
5-40 SYRINGE (ML) INJECTION EVERY 12 HOURS SCHEDULED
Status: CANCELLED | OUTPATIENT
Start: 2023-01-10

## 2023-01-10 RX ORDER — SODIUM CHLORIDE 0.9 % (FLUSH) 0.9 %
5-40 SYRINGE (ML) INJECTION EVERY 12 HOURS SCHEDULED
Status: DISCONTINUED | OUTPATIENT
Start: 2023-01-10 | End: 2023-01-10 | Stop reason: HOSPADM

## 2023-01-10 RX ORDER — SODIUM CHLORIDE 9 MG/ML
INJECTION, SOLUTION INTRAVENOUS PRN
Status: CANCELLED | OUTPATIENT
Start: 2023-01-10

## 2023-01-10 RX ORDER — LIDOCAINE HYDROCHLORIDE 20 MG/ML
INJECTION, SOLUTION EPIDURAL; INFILTRATION; INTRACAUDAL; PERINEURAL PRN
Status: DISCONTINUED | OUTPATIENT
Start: 2023-01-10 | End: 2023-01-10 | Stop reason: SDUPTHER

## 2023-01-10 RX ORDER — SODIUM CHLORIDE 9 MG/ML
25 INJECTION, SOLUTION INTRAVENOUS PRN
Status: DISCONTINUED | OUTPATIENT
Start: 2023-01-10 | End: 2023-01-10 | Stop reason: HOSPADM

## 2023-01-10 RX ORDER — SODIUM CHLORIDE, SODIUM LACTATE, POTASSIUM CHLORIDE, CALCIUM CHLORIDE 600; 310; 30; 20 MG/100ML; MG/100ML; MG/100ML; MG/100ML
INJECTION, SOLUTION INTRAVENOUS CONTINUOUS
Status: DISCONTINUED | OUTPATIENT
Start: 2023-01-10 | End: 2023-01-10 | Stop reason: HOSPADM

## 2023-01-10 RX ORDER — SODIUM CHLORIDE 0.9 % (FLUSH) 0.9 %
5-40 SYRINGE (ML) INJECTION PRN
Status: CANCELLED | OUTPATIENT
Start: 2023-01-10

## 2023-01-10 RX ORDER — MIDAZOLAM HYDROCHLORIDE 1 MG/ML
INJECTION INTRAMUSCULAR; INTRAVENOUS PRN
Status: DISCONTINUED | OUTPATIENT
Start: 2023-01-10 | End: 2023-01-10 | Stop reason: SDUPTHER

## 2023-01-10 RX ORDER — ONDANSETRON 2 MG/ML
4 INJECTION INTRAMUSCULAR; INTRAVENOUS EVERY 6 HOURS PRN
Status: CANCELLED | OUTPATIENT
Start: 2023-01-10

## 2023-01-10 RX ADMIN — PROPOFOL 75 MCG/KG/MIN: 10 INJECTION, EMULSION INTRAVENOUS at 11:27

## 2023-01-10 RX ADMIN — LIDOCAINE HYDROCHLORIDE 50 MG: 20 INJECTION, SOLUTION EPIDURAL; INFILTRATION; INTRACAUDAL; PERINEURAL at 11:27

## 2023-01-10 RX ADMIN — PROPOFOL 14 MG: 10 INJECTION, EMULSION INTRAVENOUS at 11:30

## 2023-01-10 RX ADMIN — PROPOFOL 14 MG: 10 INJECTION, EMULSION INTRAVENOUS at 11:33

## 2023-01-10 RX ADMIN — FENTANYL CITRATE 25 MCG: 50 INJECTION, SOLUTION INTRAMUSCULAR; INTRAVENOUS at 11:40

## 2023-01-10 RX ADMIN — FENTANYL CITRATE 25 MCG: 50 INJECTION, SOLUTION INTRAMUSCULAR; INTRAVENOUS at 11:32

## 2023-01-10 RX ADMIN — FENTANYL CITRATE 50 MCG: 50 INJECTION, SOLUTION INTRAMUSCULAR; INTRAVENOUS at 11:27

## 2023-01-10 RX ADMIN — MIDAZOLAM 2 MG: 1 INJECTION INTRAMUSCULAR; INTRAVENOUS at 11:27

## 2023-01-10 RX ADMIN — SODIUM CHLORIDE, POTASSIUM CHLORIDE, SODIUM LACTATE AND CALCIUM CHLORIDE: 600; 310; 30; 20 INJECTION, SOLUTION INTRAVENOUS at 12:01

## 2023-01-10 RX ADMIN — SODIUM CHLORIDE, POTASSIUM CHLORIDE, SODIUM LACTATE AND CALCIUM CHLORIDE: 600; 310; 30; 20 INJECTION, SOLUTION INTRAVENOUS at 10:27

## 2023-01-10 ASSESSMENT — PAIN - FUNCTIONAL ASSESSMENT: PAIN_FUNCTIONAL_ASSESSMENT: NONE - DENIES PAIN

## 2023-01-10 NOTE — ANESTHESIA PRE PROCEDURE
Department of Anesthesiology  Preprocedure Note       Name:  Jan Pinzon   Age:  23 y. o.  :  2000                                          MRN:  [id number]         Date:  1/10/2023      Surgeon: Ida Smith):  Parish Dick MD    Procedure: Procedure(s):  COLONOSCOPY    Medications prior to admission:   Prior to Admission medications    Medication Sig Start Date End Date Taking? Authorizing Provider   guaiFENesin (MUCINEX) 600 MG extended release tablet Take 1 tablet by mouth 2 times daily for 15 days 23  LYLE Watkins   Adalimumab (HUMIRA PEN) 40 MG/0.4ML PNKT Inject 40 mg into the skin every 14 days 22   Bob Garcia MD   vitamin D (ERGOCALCIFEROL) 1.25 MG (39034 UT) CAPS capsule Take 1 capsule by mouth See Admin Instructions Take once capsule by mouth twice weekly  Patient taking differently: Take 50,000 Units by mouth See Admin Instructions Take once capsule by mouth weekly 22   ASTER Dee CNP   mesalamine (CANASA) 1000 MG suppository Place 1 suppository rectally nightly 22   ASTER Dee CNP   mesalamine (LIALDA) 1.2 g EC tablet Take 4 tablets by mouth daily (with breakfast) 22   ASTER Dee CNP   dicyclomine (BENTYL) 20 MG tablet Take 1 tablet by mouth 4 times daily as needed (abdominal pain, bloating) 22   ASTER Dee CNP   famotidine (PEPCID) 40 MG tablet Take 0.5 tablets by mouth 2 times daily  Patient taking differently: Take 20 mg by mouth 2 times daily Takes as needed 22   ASTER Dee CNP       Current medications:    No current facility-administered medications for this visit. No current outpatient medications on file.      Facility-Administered Medications Ordered in Other Visits   Medication Dose Route Frequency Provider Last Rate Last Admin    lactated ringers infusion   IntraVENous Continuous Sanya MD Jenni 100 mL/hr at 01/10/23 1027 New Bag at 01/10/23 1027    sodium chloride flush 0.9 % injection 5-40 mL  5-40 mL IntraVENous PRN Park Escobar MD        sodium chloride flush 0.9 % injection 5-40 mL  5-40 mL IntraVENous 2 times per day Fabiano Hutson MD        sodium chloride flush 0.9 % injection 5-40 mL  5-40 mL IntraVENous PRN Fabiano Hutson MD        0.9 % sodium chloride infusion  25 mL IntraVENous PRN Fabiano Hutson MD           Allergies: Allergies   Allergen Reactions    Cattle Epithelium Extract Allergy Skin Test Other (See Comments)     On allergy test    Cat Hair Extract Other (See Comments)     sneezing    Dog Epithelium Allergy Skin Test Other (See Comments)     sneezing    Mixed Ragweed Itching    Seasonal Other (See Comments)     Runny nose,sneezing,coughing       Problem List:    Patient Active Problem List   Diagnosis Code    Hx of colonoscopy Z98.890    Allergic rhinitis due to pollen J30.1    Crohn's disease of colon with rectal bleeding ([facility]) K50.111    Contact dermatitis L25.9    Colitis K52.9    Acute pharyngitis due to other specified organisms J02.8       Past Medical History:        Diagnosis Date    Allergic     Allergic rhinitis     Crohn's colitis ([facility] [address] [address]     Hx of colonoscopy 09/27/2021    9/15/2021 colonoscopy Dr Gera Rich probable Crohn's disease involving a 2 cm circumferential segment in the proximal transverse colon, mild in intension. Anorectal region from the anus at 20 cm with moderate granularity.  Asacol HD 2 tablet TID, hydrocortisone suppositories in the evening for 2 weeks, small bowel follow through FU in 3 weeks    IBS (irritable bowel syndrome)        Past Surgical History:        Procedure Laterality Date    COLONOSCOPY N/A 01/04/2022    COLONOSCOPY WITH BIOPSY performed by Fabiano Hutson MD at [facility] N/A 06/14/2022    COLONOSCOPY WITH BIOPSY performed by Fabiano Hutson MD at [facility]  09/2021    Dr. Jesse Pierson EXTRACTION         Social History:    Social History Tobacco Use    Smoking status: Never     Passive exposure: Never    Smokeless tobacco: Never   Substance Use Topics    Alcohol use: Never                                Counseling given: Not Answered      Vital Signs (Current): There were no vitals filed for this visit. BP Readings from Last 3 Encounters:   01/10/23 (!) 132/98   01/02/23 113/75   10/14/22 118/70       NPO Status:                                                                                 BMI:   Wt Readings from Last 3 Encounters:   01/10/23 155 lb (70.3 kg)   01/02/23 150 lb (68 kg)   10/14/22 153 lb 1.6 oz (69.4 kg)     There is no height or weight on file to calculate BMI.    CBC:   Lab Results   Component Value Date/Time    WBC 3.7 10/14/2022 10:51 AM    RBC 4.70 10/14/2022 10:51 AM    HGB 9.7 10/28/2022 09:40 AM    HCT 36.4 10/28/2022 09:40 AM    MCV 67.9 10/14/2022 10:51 AM    RDW 19.7 10/14/2022 10:51 AM     10/14/2022 10:51 AM       CMP:   Lab Results   Component Value Date/Time     10/14/2022 10:51 AM    K 4.2 10/28/2022 09:40 AM    K 4.3 11/22/2021 06:12 AM     10/14/2022 10:51 AM    CO2 23 10/14/2022 10:51 AM    BUN 12 10/14/2022 10:51 AM    CREATININE 1.0 10/14/2022 10:51 AM    GFRAA >60 10/14/2022 10:51 AM    LABGLOM >60 10/14/2022 10:51 AM    GLUCOSE 82 10/14/2022 10:51 AM    PROT 7.9 10/14/2022 10:51 AM    CALCIUM 9.0 10/14/2022 10:51 AM    BILITOT 0.4 10/14/2022 10:51 AM    ALKPHOS 80 10/14/2022 10:51 AM    AST 22 10/14/2022 10:51 AM    ALT 12 10/14/2022 10:51 AM       POC Tests: No results for input(s): POCGLU, POCNA, POCK, POCCL, POCBUN, POCHEMO, POCHCT in the last 72 hours.     Coags: No results found for: PROTIME, INR, APTT    HCG (If Applicable): No results found for: PREGTESTUR, PREGSERUM, HCG, HCGQUANT     ABGs: No results found for: PHART, PO2ART, ZOA7RYE, EWB2NSB, BEART, L5SSISHW     Type & Screen (If Applicable):  No results found for: LABABO, [address]    Drug/Infectious Status (If Applicable):  No results found for: HIV, HEPCAB    COVID-19 Screening (If Applicable):   Lab Results   Component Value Date/Time    COVID19 Not-Detected 09/20/2022 11:10 AM           Anesthesia Evaluation  Patient summary reviewed no history of anesthetic complications:   Airway: Mallampati: III  TM distance: >3 FB   Neck ROM: full  Mouth opening: > = 3 FB   Dental:      Comment: Dentition intact, denies any loose teeth,    Pulmonary:Negative Pulmonary ROS and normal exam  breath sounds clear to auscultation                             Cardiovascular:Negative CV ROS            Rhythm: regular  Rate: normal                    Neuro/Psych:   Negative Neuro/Psych ROS              GI/Hepatic/Renal:   (+) bowel prep,          ROS comment: H/O Crohn's disease of colon with rectal bleeding . Endo/Other: Negative Endo/Other ROS                    Abdominal:             Vascular: negative vascular ROS. Other Findings:             Anesthesia Plan      MAC     ASA 2       Induction: intravenous. Anesthetic plan and risks discussed with patient. Plan discussed with CRNA. DOS STAFF ADDENDUM:    Pt seen and examined, chart reviewed (including anesthesia, drug and allergy history). Anesthetic plan, risks, benefits, alternatives, and personnel involved discussed with patient. Patient verbalized an understanding and agrees to proceed. Plan discussed with care team members and agreed upon.     Dago Amaya MD  Staff Anesthesiologist  11:08 AM      Dago Amaya MD   1/10/2023

## 2023-01-10 NOTE — DISCHARGE INSTRUCTIONS
Recommendations:  -The patient will be observed post procedure until all discharge criteria are met. -Patient has a contact number available for emergencies. The signs and symptoms of potential delayed complications were discussed with the patient.    -Return to normal activities tomorrow. -Written discharge instructions were provided to the patient.    -Await pathology results.  -Timeframe until next colonoscopy will be discussed in clinic.  -Clinic appointment scheduled 1/18/22. Learning About Colonoscopy  What is a colonoscopy? A colonoscopy is a test (also called a procedure) that lets a doctor look inside your large intestine. The doctor uses a thin, lighted tube called a colonoscope. The doctor uses it to look for small growths called polyps, colon or rectal cancer (colorectal cancer), or other problems like bleeding. During the procedure, the doctor can take samples of tissue. The samples can then be checked for cancer or other conditions. The doctor can also take out polyps. How is a colonoscopy done? This procedure is done in a doctor's office or a clinic or hospital. You will get medicine to help you relax and not feel pain. Some people find that they don't remember having the test because of the medicine. The doctor gently moves the colonoscope, or scope, through the colon. The scope is also a small video camera. It lets the doctor see the colon and take pictures. How do you prepare for the procedure? You need to clean out your colon before the procedure so the doctor can see your colon. This depends on which \"colon prep\" your doctor recommends. To clean out your colon, you'll do a \"colon prep\" before the test. This means you stop eating solid foods and drink only clear liquids. You can have water, tea, coffee, clear juices, clear broths, flavored ice pops, and gelatin (such as Jell-O). Do not drink anything red or purple.   The day or night before the procedure, you drink a large amount of a special liquid. This causes loose, frequent stools. You will go to the bathroom a lot. Your doctor may have you drink part of the liquid the evening before and the rest on the day of the test. It's very important to drink all of the liquid. If you have problems drinking it, call your doctor. Arrange to have someone take you home after the test.  What can you expect after a colonoscopy? Your doctor will tell you when you can eat and do your usual activities. Drink a lot of fluid after the test to replace the fluids you may have lost during the colon prep. But don't drink alcohol. Your doctor will talk to you about when you'll need your next colonoscopy. The results of your test and your risk for colorectal cancer will help your doctor decide how often you need to be checked. After the test, you may be bloated or have gas pains. You may need to pass gas. If a biopsy was done or a polyp was removed, you may have streaks of blood in your stool (feces) for a few days. Check with your doctor to see when it is safe to take aspirin and nonsteroidal anti-inflammatory drugs (NSAIDs) again. Problems such as heavy rectal bleeding may not occur until several weeks after the test. This isn't common. But it can happen after polyps are removed. Follow-up care is a key part of your treatment and safety. Be sure to make and go to all appointments, and call your doctor if you are having problems. It's also a good idea to know your test results and keep a list of the medicines you take. Where can you learn more? Go to http://www.woods.com/ and enter Z368 to learn more about \"Learning About Colonoscopy. \"  Current as of: May 4, 2022               Content Version: 13.5  © 5148-7976 [employer], [employer]. Care instructions adapted under license by [facility] [facility]  If you have questions about a medical condition or this instruction, always ask your healthcare professional. Vicky Lucas disclaims any warranty or liability for your use of this information.

## 2023-01-10 NOTE — ANESTHESIA POSTPROCEDURE EVALUATION
Department of Anesthesiology  Postprocedure Note    Patient: Neena Davidson  MRN: [id number]  YOB: 2000  Date of evaluation: 1/10/2023      Procedure Summary     Date: 01/10/23 Room / Location: [address] [place] [address] 01 / [facility]    Anesthesia Start: [phone] Anesthesia Stop: 1218    Procedure: COLONOSCOPY POLYPECTOMY SNARE/COLD BIOPSY Diagnosis:       Crohn's disease of colon, with rectal bleeding ([address]      (Crohn's disease of colon, with rectal bleeding ([address] [K50.111])    Surgeons: Jackeline Mak MD Responsible Provider: Halley Kinney MD    Anesthesia Type: MAC ASA Status: 2          Anesthesia Type: No value filed.     Juan Carlos Phase I: Juan Carlos Score: 10    Juan Carlos Phase II:        Anesthesia Post Evaluation    Patient location during evaluation: PACU  Patient participation: complete - patient participated  Level of consciousness: awake and alert  Airway patency: patent  Nausea & Vomiting: no nausea and no vomiting  Complications: no  Cardiovascular status: hemodynamically stable  Respiratory status: acceptable  Hydration status: euvolemic

## 2023-01-10 NOTE — H&P
[facility])   Gastroenterology, Hepatology, &  Advanced Endoscopy    Consult       ASSESSMENT AND PLAN:    22y/M w/ history of Crohn's Colitis on maintenance therapy with Humira presents for endoscopic evaluation and re-staging of disease. PLAN:  Colonoscopy today        HISTORY OF PRESENT ILLNESS:      Mr. Hortencia Angulo is a 22y/M who presents for endoscopic evaluation and re-staging of disease for Crohn's Colitis. He is currently maintained on Humira. He had a Cdiff infection last year with significant worsening of symptoms which improved with Vancomycin. Symptoms overall have been controlled. Past Medical History:        Diagnosis Date    Allergic     Allergic rhinitis     Crohn's colitis ([facility] [address]     Hx of colonoscopy 09/27/2021    9/15/2021 colonoscopy Dr Rajan Chan probable Crohn's disease involving a 2 cm circumferential segment in the proximal transverse colon, mild in intension. Anorectal region from the anus at 20 cm with moderate granularity. Asacol HD 2 tablet TID, hydrocortisone suppositories in the evening for 2 weeks, small bowel follow through FU in 3 weeks    IBS (irritable bowel syndrome)      Past Surgical History:        Procedure Laterality Date    COLONOSCOPY N/A 01/04/2022    COLONOSCOPY WITH BIOPSY performed by Cherylene Guardian, MD at [address] [place] [address] 06/14/2022    COLONOSCOPY WITH BIOPSY performed by Cherylene Guardian, MD at [place] [address]  09/2021    Dr. Tasia Terrell History:    TOBACCO:   reports that he has never smoked. He has never been exposed to tobacco smoke. He has never used smokeless tobacco.  ETOH:   reports no history of alcohol use. DRUGS:   reports no history of drug use.   Family History:       Problem Relation Age of Onset    Other Mother         blood clots    No Known Problems Father     No Known Problems Sister     No Known Problems Brother        No current facility-administered medications for this encounter. Current Outpatient Medications:     guaiFENesin (MUCINEX) 600 MG extended release tablet, Take 1 tablet by mouth 2 times daily for 15 days, Disp: 30 tablet, Rfl: 0    Adalimumab (HUMIRA PEN) 40 MG/0.4ML PNKT, Inject 40 mg into the skin every 14 days, Disp: 2 each, Rfl: 6    vitamin D (ERGOCALCIFEROL) 1.25 MG (67374 UT) CAPS capsule, Take 1 capsule by mouth See Admin Instructions Take once capsule by mouth twice weekly (Patient taking differently: Take 50,000 Units by mouth See Admin Instructions Take once capsule by mouth weekly), Disp: 8 capsule, Rfl: 1    mesalamine (CANASA) 1000 MG suppository, Place 1 suppository rectally nightly, Disp: 60 suppository, Rfl: 3    mesalamine (LIALDA) 1.2 g EC tablet, Take 4 tablets by mouth daily (with breakfast), Disp: 120 tablet, Rfl: 5    dicyclomine (BENTYL) 20 MG tablet, Take 1 tablet by mouth 4 times daily as needed (abdominal pain, bloating), Disp: 60 tablet, Rfl: 5    famotidine (PEPCID) 40 MG tablet, Take 0.5 tablets by mouth 2 times daily (Patient taking differently: Take 20 mg by mouth 2 times daily Takes as needed), Disp: 30 tablet, Rfl: 3     Allergies:  Cattle epithelium extract allergy skin test, Cat hair extract, Dog epithelium allergy skin test, Mixed ragweed, and Seasonal    ROS:  General: Patient denies n/v/f/c or weight loss. HEENT: Patient denies persistent postnasal drip, scleral icterus, drooling, persistent bleeding from nose/mouth. Resp: Patient denies SOB, wheezing, productive cough. Cards: Patient denies CP, palpitations, significant edema  GI: As above. Derm: Patient denies jaundice/rashes. Musc: Patient denies diffuse/irregular joint swelling or myalgias.      PHYSICAL EXAM:  Ht 5' 8\" (1.727 m)   Wt 160 lb (72.6 kg)   BMI 24.33 kg/m²   Physical Exam:  General: Overall well-appearing, NAD  HEENT: PERRLA, EOMI, Anicteric sclera, MMM, no rhinorrhea  Cards: RRR, no LE edema  Resp: Breathing comfortably on room air, good air movement, no use of accessory muscles, no audible wheezing  Abdomen: soft, NT, ND. Extremities: Moves all extremities, no effusions or bruising.   Skin: No rashes or jaundice  Neuro: A&O x 3, CN grossly intact, non-focal exam               Electronically signed by Milagro Cantu MD on 1/10/2023 at 6:20 AM

## 2023-01-10 NOTE — OP NOTE
Operative Note      Patient: Rupinder Lamas  YOB: 2000  MRN: [id number]    Date of Procedure: 1/10/2023    Pre-Op Diagnosis: Crohn's disease of colon, with rectal bleeding ([facility] [address] [K50.111]    Post-Op Diagnosis: [address] OR IOAR:[id number]}       Procedure(s):  COLONOSCOPY POLYPECTOMY SNARE/COLD BIOPSY    Surgeon(s):  Viky Crawford MD    Assistant:   Surgical Assistant: Jaquelin Pate RN    Anesthesia: Monitor Anesthesia Care    Estimated Blood Loss (mL): {NUMBERS; RAIMUNDO:72512}    Complications: {Symptoms; Intra-op complications:77254}    Specimens:   ID Type Source Tests Collected by Time Destination   A : Bx TI Tissue Tissue SURGICAL PATHOLOGY Viky Crawford MD 1/10/2023 1133    B : bx right side colon Tissue Colon SURGICAL PATHOLOGY Viky Crawford MD 1/10/2023 1134    C : bx transverse colon Tissue Colon SURGICAL PATHOLOGY Viky Crawford MD 1/10/2023 1135    D : bx left side colon Tissue Colon SURGICAL PATHOLOGY Viky Crawford MD 1/10/2023 1135    E : bx rectum Specimen Rectum SURGICAL PATHOLOGY Viky Crawford MD 1/10/2023 1136    F : bx polyp transverse colon Tissue Colon SURGICAL PATHOLOGY Viky Crawford MD 1/10/2023 1201    G : bx descending polyp Tissue Colon SURGICAL PATHOLOGY Viky Crawford MD 1/10/2023 1203    H : Polypectomy descending colon Tissue Colon SURGICAL PATHOLOGY Viky Crawford MD 1/10/2023 1205        Implants:  * No implants in log *      Drains: * No LDAs found *    Findings:     Normal terminal ileum. Biopsied. Cecum with erythema and granularity. Biopsied  Ascending colon with edema and mild erythema. Biopsied. Transverse colon with pseudopolyps and edema, polypoid tissue. Biopsied. Descending colon with pseudopolyps and edema with polyopoid tissue. Biopsied. Distal sigmoid and rectum with minimal erythema. Normal vascular pattern. Biopsied. No internal hemorrhoids.      Detailed Description of Procedure:   ***    Electronically signed by Viky Crawford MD on 1/10/2023 at 12:26 PM and edema. Biopsied. Pseudopolyps resected with cold snare. Distal sigmoid colon and rectum with minimal erythema. Biopsied. No internal hemorrhoids. Detailed Description of Procedure:   Pre-Anesthesia Assessment:  Prior to the procedure, a history and physical was performed and patient medications and allergies were reviewed. The risks, benefits, complications, treatment options and expected outcomes were discussed with the patient. The possibilities of reaction to medication, pulmonary aspiration, perforation of the gastrointestinal tract, bleeding requiring transfusion or operation, respiratory failure requiring placement on a ventilator, and failure to diagnose a condition or identify polyps/lesions were discussed with the patient. All questions were answered and informed consent was obtained. Patient identification and proposed procedure were verified by the physician, the nurse, the anesthesiologist, the anesthetist, and the technician in the preprocedure area. Please see accompanying documentation. All staff in attendance for the performed procedure act in the role of the Chaperone. After I obtained informed consent, the scope was passed under direct vision. Throughout the procedure, the patient's blood pressure, pulse, and oxygen saturations were monitored continuously. The colonoscope was introduced through the anus and advanced to the terminal ileum. The procedure was performed without difficulty. The patient tolerated the procedure well. Colonoscopy:    Anticoagulants: None. Primary Family Member/s with Colon Cancer: None. The quality of the bowel preparation was adequate. The terminal ileum, the ileocecal valve, the appendiceal orifice, and the rectum via retroflex were photographed. Findings: On Perianal exam, no external hemorrhoids or michael-anal disease was appreciated. The mucosa of the terminal ileum appeared normal. No inflammation or ulceration was appreciated. Biopsies were obtained. The mucosa of the cecum showed inflammatory change with erythema and granularity. No significantly active inflammation or ulceration was appreciated. Decreased vascular pattern appreciated. Biopsies obtained. The mucosa of the ascending colon showed mucosal changes of mild inflammation with erythema and edema. No significantly active inflammation or ulceration was appreciated. Decreased vascular pattern appreciated. Biopsies obtained. The mucosa of the transverse colon showed edematous mucosa with polypoid mucosal changes and polypoid lesions consistent with pseudopolyps. No significantly active inflammation or ulceration was appreciated. Decreased vascular pattern appreciated. Biopsies obtained. Pseudopolyps were resected with a cold snare for representative sampling. The mucosa of the descending colon showed edematous mucosa with polypoid mucosal changes and polypoid lesions consistent with pseudopolyps. No significantly active inflammation or ulceration was appreciated. Biopsies obtained. Pseudopolyps were resected with a cold snare for representative sampling. The mucosa of the sigmoid colon and rectum was overall normal in appearance outside of mild erythema. The vascular pattern was normal. No significantly active inflammation or ulceration was appreciated. Biopsies obtained. No internal rectal hemorrhoids were present on rectal retroflexion. Recommendations:  -The patient will be observed post procedure until all discharge criteria are met. -Patient has a contact number available for emergencies. The signs and symptoms of potential delayed complications were discussed with the patient.    -Return to normal activities tomorrow. -Written discharge instructions were provided to the patient.    -Await pathology results.  -Timeframe until next colonoscopy will be discussed in clinic.  -Clinic appointment scheduled 1/18/22.       Electronically signed by Devaughn Sol Adarsh Carey MD on 1/10/2023 at 12:26 PM

## 2023-01-12 RX ORDER — MESALAMINE 1.2 G/1
4800 TABLET, DELAYED RELEASE ORAL
Qty: 120 TABLET | Refills: 5 | Status: SHIPPED | OUTPATIENT
Start: 2023-01-12

## 2023-01-18 ENCOUNTER — OFFICE VISIT (OUTPATIENT)
Dept: GASTROENTEROLOGY | Age: 23
End: 2023-01-18
Payer: COMMERCIAL

## 2023-01-18 VITALS
OXYGEN SATURATION: 96 % | TEMPERATURE: 97 F | WEIGHT: 160 LBS | HEART RATE: 68 BPM | HEIGHT: 69 IN | DIASTOLIC BLOOD PRESSURE: 82 MMHG | SYSTOLIC BLOOD PRESSURE: 129 MMHG | BODY MASS INDEX: 23.7 KG/M2 | RESPIRATION RATE: 16 BRPM

## 2023-01-18 DIAGNOSIS — J32.9 SINUSITIS, UNSPECIFIED CHRONICITY, UNSPECIFIED LOCATION: ICD-10-CM

## 2023-01-18 DIAGNOSIS — K50.111 CROHN'S DISEASE OF COLON WITH RECTAL BLEEDING (HCC): Primary | ICD-10-CM

## 2023-01-18 DIAGNOSIS — K52.9 COLITIS: ICD-10-CM

## 2023-01-18 PROCEDURE — 99213 OFFICE O/P EST LOW 20 MIN: CPT | Performed by: NURSE PRACTITIONER

## 2023-01-18 RX ORDER — FLUOCINONIDE TOPICAL SOLUTION USP, 0.05% 0.5 MG/ML
SOLUTION TOPICAL
COMMUNITY
Start: 2022-12-28

## 2023-01-18 RX ORDER — AZITHROMYCIN 250 MG/1
250 TABLET, FILM COATED ORAL SEE ADMIN INSTRUCTIONS
Qty: 6 TABLET | Refills: 0 | Status: SHIPPED | OUTPATIENT
Start: 2023-01-18 | End: 2023-01-23

## 2023-01-18 RX ORDER — PREDNISONE 10 MG/1
TABLET ORAL
Qty: 100 TABLET | Refills: 0 | Status: SHIPPED | OUTPATIENT
Start: 2023-01-18 | End: 2023-02-27

## 2023-01-18 RX ORDER — KETOCONAZOLE 20 MG/ML
SHAMPOO TOPICAL
COMMUNITY
Start: 2022-11-18

## 2023-01-18 NOTE — PROGRESS NOTES
Campos Roberts (:  2000) is a 23 y.o. male, here for evaluation of the following chief complaint(s):  Follow-up (Family would like a referral to a dietician )      SUBJECTIVE/OBJECTIVE:  HPI:    Judah Booth is a very pleasant 23year old female with a history of Crohn's disease    Findings:      Normal terminal ileum. Biopsied. Cecum with erythema and granularity. Biopsied  Ascending colon with edema and mild erythema. Biopsied. Transverse colon with pseudopolyps and edema, polypoid tissue. Biopsied. Descending colon with pseudopolyps and edema with polyopoid tissue. Biopsied. Distal sigmoid and rectum with minimal erythema. Normal vascular pattern. Biopsied. No internal hemorrhoids. Diagnosis:   A. Terminal ileum, biopsy: Small intestinal mucosa showing no pathologic   alteration. B.  Colon, right side, biopsy, biopsy: Colonic mucosa showing diffuse   chronic active colitis, see comment. C.  Colon, transverse, biopsy: Colonic mucosa showing patchy chronic   active colitis, see comment. D.  Colon, left side, biopsy: Colonic mucosa showing diffuse chronic   active colitis, see comment. E.  Rectum, biopsy: Rectal mucosa showing diffuse chronic active   proctitis, see comment     F. Transverse colon, polyp, polypectomy: Fragments of inflammatory   pseudopolyp, see comment. G.  Descending colon, polyp, polypectomy: Fragments of inflammatory   pseudopolyp, see comment. H. Descending colon, polyp, polypectomy: Fragments of inflammatory   pseudopolyp, see comment.      Patient complains of bloody watery stools for the last 2 days  According to the patients mother, Judah Booth had rectal bleeding a few weeks ago  He started  Humira in 2022  Last Humira injection was on Tuesday of last week  Had to hold it for about 4 days due to a sinus infection    Patient had a sinus infection for about a week  Presented to urgent care where he was told it was viral  Started to feel better for about 3 days, then the sinus infection returned          ROS:  General: Patient denies n/v/f/c or weight loss. HEENT: Patient denies persistent postnasal drip, scleral icterus, drooling, persistent bleeding from nose/mouth. Resp: Patient denies SOB, wheezing, productive cough. Cards: Patient denies CP, palpitations, significant edema  GI: As above. Derm: Patient denies jaundice/rashes. Musc: Patient denies diffuse/irregular joint swelling or myalgias. Objective   Wt Readings from Last 3 Encounters:   01/18/23 160 lb (72.6 kg)   01/10/23 155 lb (70.3 kg)   01/02/23 150 lb (68 kg)     Temp Readings from Last 3 Encounters:   01/18/23 97 °F (36.1 °C) (Temporal)   01/10/23 97.8 °F (36.6 °C) (Temporal)   01/02/23 99.2 °F (37.3 °C) (Infrared)     BP Readings from Last 3 Encounters:   01/18/23 129/82   01/10/23 133/62   01/02/23 113/75     Pulse Readings from Last 3 Encounters:   01/18/23 68   01/10/23 62   01/02/23 69        Physical Exam  Constitutional:       Appearance: Normal appearance. HENT:      Nose: Congestion present. Pulmonary:      Breath sounds: Normal breath sounds. Abdominal:      General: Bowel sounds are normal.      Palpations: Abdomen is soft. Neurological:      Mental Status: He is alert. Past Medical History:   Diagnosis Date    Allergic     Allergic rhinitis     Crohn's colitis ([facility] [address]     Hx of colonoscopy 09/27/2021    9/15/2021 colonoscopy Dr Meghana Hale probable Crohn's disease involving a 2 cm circumferential segment in the proximal transverse colon, mild in intension. Anorectal region from the anus at 20 cm with moderate granularity.  Asacol HD 2 tablet TID, hydrocortisone suppositories in the evening for 2 weeks, small bowel follow through FU in 3 weeks    IBS (irritable bowel syndrome)       Past Surgical History:   Procedure Laterality Date    COLONOSCOPY N/A 01/04/2022    COLONOSCOPY WITH BIOPSY performed by Luis A Russell MD at [address] [id number] N/A 06/14/2022 COLONOSCOPY WITH BIOPSY performed by Cassidy Spencer MD at [facility] [address]  09/2021    Dr. Megan Sutherland    COLONOSCOPY N/A 1/10/2023    COLONOSCOPY POLYPECTOMY SNARE/COLD BIOPSY performed by Cassidy Spencer MD at [facility]        Family History   Problem Relation Age of Onset    Other Mother         blood clots    No Known Problems Father     No Known Problems Sister     No Known Problems Brother         Lab Results   Component Value Date    WBC 3.7 (L) 10/14/2022    HGB 9.7 (L) 10/28/2022    HCT 36.4 (L) 10/28/2022    MCV 67.9 (L) 10/14/2022     10/14/2022      Lab Results   Component Value Date     10/14/2022    K 4.2 10/28/2022     10/14/2022    CO2 23 10/14/2022    BUN 12 10/14/2022    CREATININE 1.0 10/14/2022    GLUCOSE 82 10/14/2022    CALCIUM 9.0 10/14/2022    PROT 7.9 10/14/2022    LABALBU 4.6 10/14/2022    BILITOT 0.4 10/14/2022    ALKPHOS 80 10/14/2022    AST 22 10/14/2022    ALT 12 10/14/2022    LABGLOM >60 10/14/2022    GFRAA >60 10/14/2022                       ASSESSMENT/PLAN:    1. Crohn's disease of colon with rectal bleeding ([address]  -     CBC with Auto Differential; Future  -     Comprehensive Metabolic Panel; Future  -     Sedimentation Rate; Future  -     C-Reactive Protein; Future  2. Colitis  -     CBC with Auto Differential; Future  -     Comprehensive Metabolic Panel; Future  -     Sedimentation Rate; Future  -     C-Reactive Protein; Future  3. Sinusitis, unspecified chronicity, unspecified location  -     azithromycin (ZITHROMAX) 250 MG tablet; Take 1 tablet by mouth See Admin Instructions for 5 days 500mg on day 1 followed by 250mg on days 2 - 5, Disp-6 tablet, R-0Normal    -colonoscopy and pathology reviewed with patient and mother today  -recommended prednisone taper for possible IBD flare.   Patient would like to see how things go before starting on a steroid  -Zpack prescribed with instructions  -patient will continue Humira injections as directed.   -routine labs ordered      Return for Follow up in 3 months with Dr. Idalia Irwin. Brooke Cavazos An electronic signature was used to authenticate this note.     --ASTER Leon - CNP

## 2023-01-20 DIAGNOSIS — K50.111 CROHN'S DISEASE OF COLON WITH RECTAL BLEEDING (HCC): ICD-10-CM

## 2023-01-20 DIAGNOSIS — K52.9 COLITIS: ICD-10-CM

## 2023-01-20 LAB
ALBUMIN SERPL-MCNC: 4.3 G/DL (ref 3.5–5.2)
ALP BLD-CCNC: 112 U/L (ref 40–129)
ALT SERPL-CCNC: 15 U/L (ref 0–40)
ANION GAP SERPL CALCULATED.3IONS-SCNC: 14 MMOL/L (ref 7–16)
AST SERPL-CCNC: 18 U/L (ref 0–39)
BASOPHILS ABSOLUTE: 0.03 E9/L (ref 0–0.2)
BASOPHILS RELATIVE PERCENT: 0.4 % (ref 0–2)
BILIRUB SERPL-MCNC: 0.3 MG/DL (ref 0–1.2)
BUN BLDV-MCNC: 13 MG/DL (ref 6–20)
C-REACTIVE PROTEIN: 0.6 MG/DL (ref 0–0.4)
CALCIUM SERPL-MCNC: 9.2 MG/DL (ref 8.6–10.2)
CHLORIDE BLD-SCNC: 102 MMOL/L (ref 98–107)
CO2: 24 MMOL/L (ref 22–29)
CREAT SERPL-MCNC: 1.1 MG/DL (ref 0.7–1.2)
EOSINOPHILS ABSOLUTE: 0.02 E9/L (ref 0.05–0.5)
EOSINOPHILS RELATIVE PERCENT: 0.3 % (ref 0–6)
GFR SERPL CREATININE-BSD FRML MDRD: >60 ML/MIN/1.73
GLUCOSE BLD-MCNC: 147 MG/DL (ref 74–99)
HCT VFR BLD CALC: 43.4 % (ref 37–54)
HEMOGLOBIN: 14.3 G/DL (ref 12.5–16.5)
IMMATURE GRANULOCYTES #: 0.04 E9/L
IMMATURE GRANULOCYTES %: 0.5 % (ref 0–5)
LYMPHOCYTES ABSOLUTE: 1.49 E9/L (ref 1.5–4)
LYMPHOCYTES RELATIVE PERCENT: 18.9 % (ref 20–42)
MCH RBC QN AUTO: 25.3 PG (ref 26–35)
MCHC RBC AUTO-ENTMCNC: 32.9 % (ref 32–34.5)
MCV RBC AUTO: 76.8 FL (ref 80–99.9)
MONOCYTES ABSOLUTE: 0.79 E9/L (ref 0.1–0.95)
MONOCYTES RELATIVE PERCENT: 10 % (ref 2–12)
NEUTROPHILS ABSOLUTE: 5.51 E9/L (ref 1.8–7.3)
NEUTROPHILS RELATIVE PERCENT: 69.9 % (ref 43–80)
PDW BLD-RTO: 15.9 FL (ref 11.5–15)
PLATELET # BLD: 238 E9/L (ref 130–450)
PMV BLD AUTO: 10.2 FL (ref 7–12)
POTASSIUM SERPL-SCNC: 3.8 MMOL/L (ref 3.5–5)
RBC # BLD: 5.65 E12/L (ref 3.8–5.8)
SEDIMENTATION RATE, ERYTHROCYTE: 8 MM/HR (ref 0–15)
SODIUM BLD-SCNC: 140 MMOL/L (ref 132–146)
TOTAL PROTEIN: 8.1 G/DL (ref 6.4–8.3)
WBC # BLD: 7.9 E9/L (ref 4.5–11.5)

## 2023-01-30 DIAGNOSIS — K50.111 CROHN'S DISEASE OF COLON WITH RECTAL BLEEDING (HCC): ICD-10-CM

## 2023-01-30 RX ORDER — ADALIMUMAB 40MG/0.4ML
40 KIT SUBCUTANEOUS
Qty: 2 EACH | Refills: 6 | Status: SHIPPED | OUTPATIENT
Start: 2023-01-30

## 2023-02-15 ENCOUNTER — OFFICE VISIT (OUTPATIENT)
Dept: GASTROENTEROLOGY | Age: 23
End: 2023-02-15
Payer: COMMERCIAL

## 2023-02-15 VITALS
DIASTOLIC BLOOD PRESSURE: 63 MMHG | HEIGHT: 69 IN | OXYGEN SATURATION: 99 % | BODY MASS INDEX: 23.85 KG/M2 | SYSTOLIC BLOOD PRESSURE: 122 MMHG | WEIGHT: 161 LBS | TEMPERATURE: 97 F | RESPIRATION RATE: 16 BRPM | HEART RATE: 88 BPM

## 2023-02-15 DIAGNOSIS — K50.111 CROHN'S DISEASE OF COLON WITH RECTAL BLEEDING (HCC): Primary | ICD-10-CM

## 2023-02-15 PROCEDURE — 99212 OFFICE O/P EST SF 10 MIN: CPT | Performed by: NURSE PRACTITIONER

## 2023-02-15 NOTE — PROGRESS NOTES
Yue Palmer (:  2000) is a 23 y.o. male, here for evaluation of the following chief complaint(s):  Follow-up      SUBJECTIVE/OBJECTIVE:  HPI:    Babatunde Ricci is a very pleasant 23year old gentleman that presents today for follow up on Crohn's disease    At [facility]'s last office appointment on , he was complaining of bloody watery stools. Unfortunately, the patient had missed a dose of Humira  because of having a sinus infection. Labs after the appointment noted a CRP of 0.6 and normal ESR. HGB of 14.3 and HCT of 43.4. We had discussed the patient starting on Prednisone for a possible flare. Currently down to  1 and 1/2 tabs of Prednisone   Having 1 to 2 BM's a day without blood  Stools are formed  Appetite is normal; no weight loss      Has had the flu vaccine and COVID booster         ROS:  General: Patient denies n/v/f/c or weight loss. HEENT: Patient denies persistent postnasal drip, scleral icterus, drooling, persistent bleeding from nose/mouth. Resp: Patient denies SOB, wheezing, productive cough. Cards: Patient denies CP, palpitations, significant edema  GI: As above. Derm: Patient denies jaundice/rashes. Musc: Patient denies diffuse/irregular joint swelling or myalgias. Objective   Wt Readings from Last 3 Encounters:   02/15/23 161 lb (73 kg)   23 160 lb (72.6 kg)   01/10/23 155 lb (70.3 kg)     Temp Readings from Last 3 Encounters:   02/15/23 97 °F (36.1 °C) (Temporal)   23 97 °F (36.1 °C) (Temporal)   01/10/23 97.8 °F (36.6 °C) (Temporal)     BP Readings from Last 3 Encounters:   02/15/23 122/63   23 129/82   01/10/23 133/62     Pulse Readings from Last 3 Encounters:   02/15/23 88   23 68   01/10/23 62        Physical Exam  Constitutional:       Appearance: Normal appearance. Abdominal:      General: Bowel sounds are normal.      Palpations: Abdomen is soft. Neurological:      Mental Status: He is alert.        Past Medical History:   Diagnosis Date    Allergic     Allergic rhinitis     Crohn's colitis ([facility] [address]     Hx of colonoscopy 09/27/2021    9/15/2021 colonoscopy Dr Heath Yi probable Crohn's disease involving a 2 cm circumferential segment in the proximal transverse colon, mild in intension. Anorectal region from the anus at 20 cm with moderate granularity. Asacol HD 2 tablet TID, hydrocortisone suppositories in the evening for 2 weeks, small bowel follow through FU in 3 weeks    IBS (irritable bowel syndrome)       Past Surgical History:   Procedure Laterality Date    COLONOSCOPY N/A 01/04/2022    COLONOSCOPY WITH BIOPSY performed by Amrit Cruz MD at [facility] [address] N/A 06/14/2022    COLONOSCOPY WITH BIOPSY performed by Amrit Cruz MD at [facility] [address]  09/2021    Dr. Heath Yi    COLONOSCOPY N/A 1/10/2023    COLONOSCOPY POLYPECTOMY SNARE/COLD BIOPSY performed by Amrit Cruz MD at [facility]        Family History   Problem Relation Age of Onset    Other Mother         blood clots    No Known Problems Father     No Known Problems Sister     No Known Problems Brother         Lab Results   Component Value Date    WBC 7.9 01/20/2023    HGB 14.3 01/20/2023    HCT 43.4 01/20/2023    MCV 76.8 (L) 01/20/2023     01/20/2023      Lab Results   Component Value Date     01/20/2023    K 3.8 01/20/2023     01/20/2023    CO2 24 01/20/2023    BUN 13 01/20/2023    CREATININE 1.1 01/20/2023    GLUCOSE 147 (H) 01/20/2023    CALCIUM 9.2 01/20/2023    PROT 8.1 01/20/2023    LABALBU 4.3 01/20/2023    BILITOT 0.3 01/20/2023    ALKPHOS 112 01/20/2023    AST 18 01/20/2023    ALT 15 01/20/2023    LABGLOM >60 01/20/2023    GFRAA >60 10/14/2022                       ASSESSMENT/PLAN:    1.  Crohn's disease of colon with rectal bleeding ([facility] [address]    -rectal bleeding and diarrhea resolved with Prednisone  -advised the patient complete the Prednisone wean and continue Humira as directed  -lab work reviewed  -patient will contact the office with an update on symptoms once the Prednisone is complete  -follow up secured with Dr. Neelam Dempsey    Return for Follow up secured . An electronic signature was used to authenticate this note.     --ASTER Nagel - CNP

## 2023-03-22 ENCOUNTER — OFFICE VISIT (OUTPATIENT)
Dept: GASTROENTEROLOGY | Age: 23
End: 2023-03-22
Payer: COMMERCIAL

## 2023-03-22 VITALS
RESPIRATION RATE: 18 BRPM | TEMPERATURE: 98.2 F | HEART RATE: 67 BPM | DIASTOLIC BLOOD PRESSURE: 82 MMHG | SYSTOLIC BLOOD PRESSURE: 118 MMHG | BODY MASS INDEX: 25.18 KG/M2 | HEIGHT: 69 IN | OXYGEN SATURATION: 95 % | WEIGHT: 170 LBS

## 2023-03-22 DIAGNOSIS — K50.111 CROHN'S DISEASE OF COLON WITH RECTAL BLEEDING (HCC): ICD-10-CM

## 2023-03-22 DIAGNOSIS — K50.111 CROHN'S DISEASE OF COLON WITH RECTAL BLEEDING (HCC): Primary | ICD-10-CM

## 2023-03-22 LAB
ALBUMIN SERPL-MCNC: 4.1 G/DL (ref 3.5–5.2)
ALP SERPL-CCNC: 127 U/L (ref 40–129)
ALT SERPL-CCNC: 28 U/L (ref 0–40)
ANION GAP SERPL CALCULATED.3IONS-SCNC: 11 MMOL/L (ref 7–16)
AST SERPL-CCNC: 33 U/L (ref 0–39)
BASOPHILS # BLD: 0.06 E9/L (ref 0–0.2)
BASOPHILS NFR BLD: 1.1 % (ref 0–2)
BILIRUB SERPL-MCNC: 0.4 MG/DL (ref 0–1.2)
BUN SERPL-MCNC: 12 MG/DL (ref 6–20)
CALCIUM SERPL-MCNC: 8.6 MG/DL (ref 8.6–10.2)
CHLORIDE SERPL-SCNC: 105 MMOL/L (ref 98–107)
CO2 SERPL-SCNC: 25 MMOL/L (ref 22–29)
CREAT SERPL-MCNC: 1 MG/DL (ref 0.7–1.2)
CRP SERPL HS-MCNC: 0.4 MG/DL (ref 0–0.4)
EOSINOPHIL # BLD: 0.1 E9/L (ref 0.05–0.5)
EOSINOPHIL NFR BLD: 1.9 % (ref 0–6)
ERYTHROCYTE [DISTWIDTH] IN BLOOD BY AUTOMATED COUNT: 14.6 FL (ref 11.5–15)
FOLATE SERPL-MCNC: >20 NG/ML (ref 4.8–24.2)
GLUCOSE SERPL-MCNC: 79 MG/DL (ref 74–99)
HCT VFR BLD AUTO: 49.5 % (ref 37–54)
HGB BLD-MCNC: 15.5 G/DL (ref 12.5–16.5)
IMM GRANULOCYTES # BLD: 0.01 E9/L
IMM GRANULOCYTES NFR BLD: 0.2 % (ref 0–5)
LYMPHOCYTES # BLD: 1.63 E9/L (ref 1.5–4)
LYMPHOCYTES NFR BLD: 30.2 % (ref 20–42)
MCH RBC QN AUTO: 27 PG (ref 26–35)
MCHC RBC AUTO-ENTMCNC: 31.3 % (ref 32–34.5)
MCV RBC AUTO: 86.1 FL (ref 80–99.9)
MONOCYTES # BLD: 0.95 E9/L (ref 0.1–0.95)
MONOCYTES NFR BLD: 17.6 % (ref 2–12)
NEUTROPHILS # BLD: 2.65 E9/L (ref 1.8–7.3)
NEUTS SEG NFR BLD: 49 % (ref 43–80)
PLATELET # BLD AUTO: 212 E9/L (ref 130–450)
PMV BLD AUTO: 11.3 FL (ref 7–12)
POTASSIUM SERPL-SCNC: 4.7 MMOL/L (ref 3.5–5)
PROT SERPL-MCNC: 7.5 G/DL (ref 6.4–8.3)
RBC # BLD AUTO: 5.75 E12/L (ref 3.8–5.8)
SODIUM SERPL-SCNC: 141 MMOL/L (ref 132–146)
VIT B12 SERPL-MCNC: 384 PG/ML (ref 211–946)
VITAMIN D 25-HYDROXY: 38 NG/ML (ref 30–100)
WBC # BLD: 5.4 E9/L (ref 4.5–11.5)

## 2023-03-22 PROCEDURE — 99213 OFFICE O/P EST LOW 20 MIN: CPT | Performed by: STUDENT IN AN ORGANIZED HEALTH CARE EDUCATION/TRAINING PROGRAM

## 2023-03-22 RX ORDER — AZATHIOPRINE 50 MG/1
50 TABLET ORAL DAILY
Qty: 30 TABLET | Refills: 11 | Status: SHIPPED | OUTPATIENT
Start: 2023-03-22

## 2023-03-23 LAB — ERYTHROCYTE [SEDIMENTATION RATE] IN BLOOD BY WESTERGREN METHOD: 0 MM/HR (ref 0–15)

## 2023-04-20 ENCOUNTER — HOSPITAL ENCOUNTER (OUTPATIENT)
Age: 23
Discharge: HOME | End: 2023-04-20

## 2023-04-20 DIAGNOSIS — K50.111 CROHN'S DISEASE OF COLON WITH RECTAL BLEEDING (HCC): ICD-10-CM

## 2023-04-21 LAB
C DIFF TOX GENS STL QL NAA+PROBE: ABNORMAL
C DIFF TOXIN/ANTIGEN: NORMAL
ORGANISM: ABNORMAL

## 2023-04-22 LAB — BACTERIA STL CULT: NORMAL

## 2023-04-24 LAB — CALPROTECTIN STL-MCNT: 632 UG/G

## 2023-04-24 RX ORDER — ERGOCALCIFEROL 1.25 MG/1
50000 CAPSULE ORAL SEE ADMIN INSTRUCTIONS
Qty: 8 CAPSULE | Refills: 1 | Status: SHIPPED | OUTPATIENT
Start: 2023-04-24

## 2023-04-24 RX ORDER — VANCOMYCIN HYDROCHLORIDE 125 MG/1
125 CAPSULE ORAL 4 TIMES DAILY
Qty: 56 CAPSULE | Refills: 0 | Status: SHIPPED | OUTPATIENT
Start: 2023-04-24 | End: 2023-05-04

## 2023-05-24 ENCOUNTER — TELEPHONE (OUTPATIENT)
Dept: INTERNAL MEDICINE | Age: 23
End: 2023-05-24

## 2023-05-24 NOTE — TELEPHONE ENCOUNTER
Specialty Medication Service    Date: 5/24/2023  Patient's Name: Kaelyn Horta YOB: 2000            _____________________________________________________________________________________________    Left message to schedule PharmD follow up appointment for Specialty Medication Services. Please call: [phone] option 4. Will continue to outreach as appropriate.     Damian Yarbrough PharmD, [facility]  Ambulatory Clinical Pharmacist   [address] [place] [address],[address] Specialty Medication Service  Phone: [phone]  [facility]  Phone: [phone] option 1

## 2023-05-26 NOTE — TELEPHONE ENCOUNTER
Specialty Medication Service    Date: 5/26/2023  Patient's Name: Johnny Chung YOB: 2000            _____________________________________________________________________________________________    Reached patient to schedule PharmD follow up appointment for Specialty Medication Services. Patient scheduled for 6/5/2023. Notes from specialist are available in [address] [place] [address] Rd.       Mariano Giron CPhT  Clinical    Specialty Medication Service   [phone] option 4   Fax: [phone]    For Pharmacy Admin Tracking Only    Program: SMS  CPA in place:  Yes  Recommendation Provided To: Patient/Caregiver: 1 via Telephone  Intervention Detail: Scheduled Appointment  Intervention Accepted By: Patient/Caregiver: 1  Time Spent (min): 15

## 2023-06-05 ENCOUNTER — PHARMACY VISIT (OUTPATIENT)
Dept: INTERNAL MEDICINE | Age: 23
End: 2023-06-05

## 2023-06-05 DIAGNOSIS — K50.111 CROHN'S DISEASE OF COLON WITH RECTAL BLEEDING (HCC): ICD-10-CM

## 2023-06-05 PROCEDURE — 99999 PR OFFICE/OUTPT VISIT,PROCEDURE ONLY: CPT | Performed by: PHARMACIST

## 2023-06-05 RX ORDER — ADALIMUMAB 40MG/0.4ML
40 KIT SUBCUTANEOUS
Qty: 2 EACH | Refills: 6 | Status: SHIPPED | OUTPATIENT
Start: 2023-06-05

## 2023-06-05 ASSESSMENT — PATIENT HEALTH QUESTIONNAIRE - PHQ9
1. LITTLE INTEREST OR PLEASURE IN DOING THINGS: 0
SUM OF ALL RESPONSES TO PHQ QUESTIONS 1-9: 0
SUM OF ALL RESPONSES TO PHQ9 QUESTIONS 1 & 2: 0
SUM OF ALL RESPONSES TO PHQ QUESTIONS 1-9: 0
2. FEELING DOWN, DEPRESSED OR HOPELESS: 0
SUM OF ALL RESPONSES TO PHQ QUESTIONS 1-9: 0
SUM OF ALL RESPONSES TO PHQ QUESTIONS 1-9: 0

## 2023-06-05 NOTE — PROGRESS NOTES
Specialty Medication Service    Patient's Name: Elvis Aschoff YOB: 2000            Reason for visit: Elvis Aschoff is a 23 y.o. male presenting today for Specialty Medication Service visit follow up. Patient last seen by [facility] 11/18/2022. Patient continues on SMS formulary medication, Humira. Pharmacy completed Specialty Medication Service visit for medication monitoring and counseling. Medication list updated. Specialty Medication: HUMIRA 40MG/0.4ML PNKT   Frequency: Every 14 days  Indication: Crohn's  Initially Diagnosed: 2021  Additional Therapy:   Mesalamine  Previous Therapy:    None     Specialist:   Gisele Batres [address]  [phone]    Specialist Progress Note Available: Yes  Last Specialist Visit: 3/22/2023 - continue Humira and mesalamine    Allergies   Allergen Reactions    Cattle Epithelium Extract Allergy Skin Test Other (See Comments)     On allergy test    Cat Hair Extract Other (See Comments)     sneezing    Dog Epithelium Allergy Skin Test Other (See Comments)     sneezing    Mixed Ragweed Itching    Seasonal Other (See Comments)     Runny nose,sneezing,coughing       Past Medical History:   Diagnosis Date    Allergic     Allergic rhinitis     Crohn's colitis ([facility] [address]     Hx of colonoscopy 09/27/2021    9/15/2021 colonoscopy Dr Joanie Jasso probable Crohn's disease involving a 2 cm circumferential segment in the proximal transverse colon, mild in intension. Anorectal region from the anus at 20 cm with moderate granularity.  Asacol HD 2 tablet TID, hydrocortisone suppositories in the evening for 2 weeks, small bowel follow through FU in 3 weeks    IBS (irritable bowel syndrome)       Social History     Tobacco Use    Smoking status: Never     Passive exposure: Never    Smokeless tobacco: Never   Substance Use Topics    Alcohol use: Never     Family History   Problem Relation Age of Onset    Other Mother         blood clots    No Known Problems Father

## 2023-06-30 RX ORDER — CEPHALEXIN 500 MG/1
500 CAPSULE ORAL 2 TIMES DAILY
Qty: 14 CAPSULE | Refills: 0 | Status: SHIPPED | OUTPATIENT
Start: 2023-06-30

## 2023-07-12 RX ORDER — MESALAMINE 1.2 G/1
4800 TABLET, DELAYED RELEASE ORAL
Qty: 120 TABLET | Refills: 5 | Status: SHIPPED | OUTPATIENT
Start: 2023-07-12

## 2023-07-24 ENCOUNTER — OFFICE VISIT (OUTPATIENT)
Dept: GASTROENTEROLOGY | Age: 23
End: 2023-07-24
Payer: COMMERCIAL

## 2023-07-24 VITALS
DIASTOLIC BLOOD PRESSURE: 78 MMHG | SYSTOLIC BLOOD PRESSURE: 134 MMHG | HEIGHT: 69 IN | HEART RATE: 69 BPM | WEIGHT: 173.1 LBS | OXYGEN SATURATION: 96 % | RESPIRATION RATE: 16 BRPM | BODY MASS INDEX: 25.64 KG/M2 | TEMPERATURE: 98 F

## 2023-07-24 DIAGNOSIS — K50.111 CROHN'S DISEASE OF COLON WITH RECTAL BLEEDING (HCC): ICD-10-CM

## 2023-07-24 DIAGNOSIS — K50.111 CROHN'S DISEASE OF COLON WITH RECTAL BLEEDING (HCC): Primary | ICD-10-CM

## 2023-07-24 LAB
ABSOLUTE IMMATURE GRANULOCYTE: <0.03 K/UL (ref 0–0.58)
ALBUMIN SERPL-MCNC: 4.5 G/DL (ref 3.5–5.2)
ALP BLD-CCNC: 131 U/L (ref 40–129)
ALT SERPL-CCNC: 25 U/L (ref 0–40)
ANION GAP SERPL CALCULATED.3IONS-SCNC: 15 MMOL/L (ref 7–16)
AST SERPL-CCNC: 25 U/L (ref 0–39)
BASOPHILS ABSOLUTE: 0.04 K/UL (ref 0–0.2)
BASOPHILS RELATIVE PERCENT: 1 % (ref 0–2)
BILIRUB SERPL-MCNC: 0.7 MG/DL (ref 0–1.2)
BUN BLDV-MCNC: 13 MG/DL (ref 6–20)
C-REACTIVE PROTEIN: <3 MG/L (ref 0–5)
CALCIUM SERPL-MCNC: 8.9 MG/DL (ref 8.6–10.2)
CHLORIDE BLD-SCNC: 105 MMOL/L (ref 98–107)
CO2: 22 MMOL/L (ref 22–29)
CREAT SERPL-MCNC: 1.2 MG/DL (ref 0.7–1.2)
EOSINOPHILS ABSOLUTE: 0.16 K/UL (ref 0.05–0.5)
EOSINOPHILS RELATIVE PERCENT: 3 % (ref 0–6)
FOLATE: >20 NG/ML (ref 4.8–24.2)
GFR SERPL CREATININE-BSD FRML MDRD: >60 ML/MIN/1.73M2
GLUCOSE BLD-MCNC: 97 MG/DL (ref 74–99)
HCT VFR BLD CALC: 48.9 % (ref 37–54)
HEMOGLOBIN: 15.8 G/DL (ref 12.5–16.5)
IMMATURE GRANULOCYTES: 0 % (ref 0–5)
LYMPHOCYTES ABSOLUTE: 1.31 K/UL (ref 1.5–4)
LYMPHOCYTES RELATIVE PERCENT: 23 % (ref 20–42)
MCH RBC QN AUTO: 27.9 PG (ref 26–35)
MCHC RBC AUTO-ENTMCNC: 32.3 G/DL (ref 32–34.5)
MCV RBC AUTO: 86.4 FL (ref 80–99.9)
MONOCYTES ABSOLUTE: 0.87 K/UL (ref 0.1–0.95)
MONOCYTES RELATIVE PERCENT: 15 % (ref 2–12)
NEUTROPHILS ABSOLUTE: 3.33 K/UL (ref 1.8–7.3)
NEUTROPHILS RELATIVE PERCENT: 58 % (ref 43–80)
PDW BLD-RTO: 16.3 % (ref 11.5–15)
PLATELET # BLD: 234 K/UL (ref 130–450)
PMV BLD AUTO: 11 FL (ref 7–12)
POTASSIUM SERPL-SCNC: 4.3 MMOL/L (ref 3.5–5)
RBC # BLD: 5.66 M/UL (ref 3.8–5.8)
SEDIMENTATION RATE, ERYTHROCYTE: 5 MM/HR (ref 0–15)
SODIUM BLD-SCNC: 142 MMOL/L (ref 132–146)
TOTAL PROTEIN: 8.2 G/DL (ref 6.4–8.3)
VITAMIN B-12: 446 PG/ML (ref 211–946)
VITAMIN D 25-HYDROXY: 40.3 NG/ML (ref 30–100)
WBC # BLD: 5.7 K/UL (ref 4.5–11.5)

## 2023-07-24 PROCEDURE — 99212 OFFICE O/P EST SF 10 MIN: CPT | Performed by: NURSE PRACTITIONER

## 2023-07-24 RX ORDER — ERGOCALCIFEROL 1.25 MG/1
50000 CAPSULE ORAL SEE ADMIN INSTRUCTIONS
Qty: 8 CAPSULE | Refills: 1 | Status: SHIPPED
Start: 2023-07-24 | End: 2023-07-25 | Stop reason: SDUPTHER

## 2023-07-24 RX ORDER — SODIUM, POTASSIUM,MAG SULFATES 17.5-3.13G
1 SOLUTION, RECONSTITUTED, ORAL ORAL ONCE
Qty: 1 EACH | Refills: 0 | Status: SHIPPED | OUTPATIENT
Start: 2023-07-24 | End: 2023-07-24

## 2023-07-25 RX ORDER — ERGOCALCIFEROL 1.25 MG/1
50000 CAPSULE ORAL SEE ADMIN INSTRUCTIONS
Qty: 8 CAPSULE | Refills: 1 | Status: SHIPPED | OUTPATIENT
Start: 2023-07-25

## 2023-09-25 DIAGNOSIS — K50.111 CROHN'S DISEASE OF COLON WITH RECTAL BLEEDING (HCC): ICD-10-CM

## 2023-09-27 RX ORDER — ADALIMUMAB 40MG/0.4ML
40 KIT SUBCUTANEOUS
Qty: 2 EACH | Refills: 6 | Status: SHIPPED | OUTPATIENT
Start: 2023-09-27

## 2023-10-05 ENCOUNTER — OFFICE VISIT (OUTPATIENT)
Dept: GASTROENTEROLOGY | Age: 23
End: 2023-10-05
Payer: COMMERCIAL

## 2023-10-05 VITALS
HEART RATE: 52 BPM | BODY MASS INDEX: 25.62 KG/M2 | SYSTOLIC BLOOD PRESSURE: 124 MMHG | WEIGHT: 173 LBS | OXYGEN SATURATION: 95 % | HEIGHT: 69 IN | DIASTOLIC BLOOD PRESSURE: 72 MMHG | RESPIRATION RATE: 18 BRPM | TEMPERATURE: 97 F

## 2023-10-05 DIAGNOSIS — R07.9 CHEST PAIN, UNSPECIFIED TYPE: ICD-10-CM

## 2023-10-05 DIAGNOSIS — K50.111 CROHN'S DISEASE OF COLON WITH RECTAL BLEEDING (HCC): Primary | ICD-10-CM

## 2023-10-05 PROCEDURE — 99213 OFFICE O/P EST LOW 20 MIN: CPT | Performed by: NURSE PRACTITIONER

## 2023-10-06 ENCOUNTER — TELEPHONE (OUTPATIENT)
Age: 23
End: 2023-10-06

## 2023-10-06 NOTE — TELEPHONE ENCOUNTER
Obtained a prior suth through new insurance with [employer] and [employer], rx benefits. [facility] called and verbal rx is on file. They will process and reach out to patient. Marked as urgent since patient is due next week. Notified patient he can also contact [facility] first of next week.  Electronically signed by Curly Closs, LPN on 63/8/0971 at 39:75 AM

## 2023-10-10 DIAGNOSIS — K50.111 CROHN'S DISEASE OF COLON WITH RECTAL BLEEDING (HCC): Primary | ICD-10-CM

## 2023-10-10 RX ORDER — MESALAMINE 1.2 G/1
4800 TABLET, DELAYED RELEASE ORAL
Qty: 120 TABLET | Refills: 5 | Status: SHIPPED | OUTPATIENT
Start: 2023-10-10

## 2023-10-20 ENCOUNTER — TELEPHONE (OUTPATIENT)
Dept: INTERNAL MEDICINE | Age: 23
End: 2023-10-20

## 2023-10-20 NOTE — TELEPHONE ENCOUNTER
Specialty Medication Service    Date: 10/20/2023  Patient's Name: Rene Lora YOB: 2000            _____________________________________________________________________________________________    Patient no longer has [facility]) benefits (termed 10/1/2023). Patient is no longer enrolled in SMS program. Left message to explain they will no longer be eligible for SMS services and that we will be discharging them from the program.  Informed patient future SMS appointments will be canceled and no further outreach planned. Patient instructed to contact [address] [place] [address] ([phone]) to provide updated insurance information for continuity in care if possible. and Additionally, informed patient if they believe this is incorrect to contact  ([phone]) right away. In addition, mailed letter/sent MyChart message (if applicable), updated SMS spreadsheet, deactivated any current SMS prescriptions and updated Shannan Hoff as well as alerted the pharmacy.      For Pharmacy Admin Tracking Only    Program: SMS  CPA in place:  No  Time Spent (min): 20

## 2023-11-01 ENCOUNTER — TELEPHONE (OUTPATIENT)
Dept: GASTROENTEROLOGY | Age: 23
End: 2023-11-01

## 2023-11-01 ENCOUNTER — PREP FOR PROCEDURE (OUTPATIENT)
Dept: GASTROENTEROLOGY | Age: 23
End: 2023-11-01

## 2023-11-01 DIAGNOSIS — K50.111 CROHN'S DISEASE OF LARGE INTESTINE WITH RECTAL BLEEDING (HCC): ICD-10-CM

## 2023-11-01 PROBLEM — R07.9 CHEST PAIN, UNSPECIFIED: Status: ACTIVE | Noted: 2023-11-01

## 2023-11-01 NOTE — TELEPHONE ENCOUNTER
Prior Authorization Form:      DEMOGRAPHICS:                     Patient Name:  Rene Lora  Patient :  2000            Insurance:  Payor: [address] / Plan: [address] [place] [address] / Product Type: *No Product type* /   Insurance ID Number:    Payer/Plan Subscr  Sex Relation Sub. Ins. ID Effective Group Num   1.  [address] 2000 Male Self [id number]                                   [id number]         DIAGNOSIS & PROCEDURE:                       Procedure/Operation: egd, colonoscopy           CPT Code: 29944, 90466    Diagnosis:  crohn's, chest pain unspecified    ICD10 Code: k50.111, r07.9    Location:  [facility]    Surgeon:  Joo Lua INFORMATION:                          Date: 24    Time: 730              Anesthesia:  MAC/TIVA                                                       Status:  Outpatient        Special Comments:  na       Electronically signed by Ulisses Guerrero LPN on  at 4:62 PM

## 2023-11-06 ENCOUNTER — OFFICE VISIT (OUTPATIENT)
Dept: FAMILY MEDICINE CLINIC | Age: 23
End: 2023-11-06
Payer: COMMERCIAL

## 2023-11-06 VITALS
BODY MASS INDEX: 26.22 KG/M2 | HEIGHT: 69 IN | OXYGEN SATURATION: 97 % | HEART RATE: 69 BPM | SYSTOLIC BLOOD PRESSURE: 126 MMHG | RESPIRATION RATE: 16 BRPM | TEMPERATURE: 97.8 F | WEIGHT: 177 LBS | DIASTOLIC BLOOD PRESSURE: 78 MMHG

## 2023-11-06 DIAGNOSIS — Z00.00 ENCOUNTER FOR WELL ADULT EXAM WITHOUT ABNORMAL FINDINGS: Primary | ICD-10-CM

## 2023-11-06 DIAGNOSIS — Z23 NEED FOR IMMUNIZATION AGAINST INFLUENZA: ICD-10-CM

## 2023-11-06 PROCEDURE — 90471 IMMUNIZATION ADMIN: CPT | Performed by: NURSE PRACTITIONER

## 2023-11-06 PROCEDURE — 90674 CCIIV4 VAC NO PRSV 0.5 ML IM: CPT | Performed by: NURSE PRACTITIONER

## 2023-11-06 PROCEDURE — 99395 PREV VISIT EST AGE 18-39: CPT | Performed by: NURSE PRACTITIONER

## 2023-11-06 SDOH — ECONOMIC STABILITY: FOOD INSECURITY: WITHIN THE PAST 12 MONTHS, THE FOOD YOU BOUGHT JUST DIDN'T LAST AND YOU DIDN'T HAVE MONEY TO GET MORE.: NEVER TRUE

## 2023-11-06 SDOH — ECONOMIC STABILITY: FOOD INSECURITY: WITHIN THE PAST 12 MONTHS, YOU WORRIED THAT YOUR FOOD WOULD RUN OUT BEFORE YOU GOT MONEY TO BUY MORE.: NEVER TRUE

## 2023-11-06 SDOH — ECONOMIC STABILITY: INCOME INSECURITY: HOW HARD IS IT FOR YOU TO PAY FOR THE VERY BASICS LIKE FOOD, HOUSING, MEDICAL CARE, AND HEATING?: NOT HARD AT ALL

## 2023-11-06 ASSESSMENT — ENCOUNTER SYMPTOMS
RHINORRHEA: 0
COLOR CHANGE: 0
SORE THROAT: 0
SINUS PAIN: 0
NAUSEA: 0
BACK PAIN: 0
COUGH: 0
SINUS PRESSURE: 0
CHEST TIGHTNESS: 0
CONSTIPATION: 0
VOICE CHANGE: 0
DIARRHEA: 0
ABDOMINAL PAIN: 1
TROUBLE SWALLOWING: 0
VOMITING: 0
WHEEZING: 0
SHORTNESS OF BREATH: 0
FACIAL SWELLING: 0

## 2023-11-06 NOTE — PROGRESS NOTES
Well Adult Note  Name: Saleem Espinoza Date: 2023   MRN: [id number] Sex: Male   Age: 23 y.o. Ethnicity: Non- / Non    : 2000 Race: White (non-)      Anderson Hartley is here for well adult exam.  History:  HPI: Patient is here for routine yearly physical/preventative visit. Patient has no complaints or concerns today. Patient is  generally healthy. Blood pressure today 126/78  Chronic problems are generally controlled. Most recent labs reviewed with patient and  are not remarkable. Health maintenance reviewed with patient and is not up to date. Patient does not history of STD's. Patient does not smoke. Patient does not drink alcohol. Patient does not do drugs. Patient does feel safe at home. Patient does not have history of domestic or partner violence. . Patient is  up to date with dental exam, august or September. Patient is   up to date with eye exam,has appt on Wednesday. Patient is  up to date with colonoscopy and is scheduled for repeat due to Crohns. Overall doing well. Review of Systems   Constitutional:  Negative for activity change, appetite change, chills, diaphoresis, fatigue, fever and unexpected weight change. HENT:  Negative for congestion, dental problem, drooling, ear discharge, ear pain, facial swelling, hearing loss, mouth sores, nosebleeds, postnasal drip, rhinorrhea, sinus pressure, sinus pain, sneezing, sore throat, tinnitus, trouble swallowing and voice change. Eyes:  Negative for visual disturbance. Respiratory:  Negative for cough, chest tightness, shortness of breath and wheezing. Cardiovascular:  Positive for chest pain (had in august). Negative for palpitations and leg swelling. Gastrointestinal:  Positive for abdominal pain (occasional from Crohn's). Negative for constipation, diarrhea, nausea and vomiting. Endocrine: Negative for cold intolerance, heat intolerance, polydipsia, polyphagia and polyuria.    Genitourinary:  Negative

## 2023-11-13 ENCOUNTER — PATIENT MESSAGE (OUTPATIENT)
Dept: GASTROENTEROLOGY | Age: 23
End: 2023-11-13

## 2023-11-14 DIAGNOSIS — K50.111 CROHN'S DISEASE OF LARGE INTESTINE WITH RECTAL BLEEDING (HCC): Primary | ICD-10-CM

## 2023-11-14 NOTE — TELEPHONE ENCOUNTER
I can leave the needed items at the registration desk at our [facility] location. Come to back entrance registration area and I will have them in a bag. Just ask the girl at the desk.   Su Milian

## 2023-11-15 ENCOUNTER — HOSPITAL ENCOUNTER (OUTPATIENT)
Age: 23
Discharge: HOME | End: 2023-11-15
Payer: COMMERCIAL

## 2023-11-15 DIAGNOSIS — K50.111 CROHN'S DISEASE OF LARGE INTESTINE WITH RECTAL BLEEDING (HCC): ICD-10-CM

## 2023-11-15 DIAGNOSIS — K50.111 CROHN'S DISEASE OF COLON WITH RECTAL BLEEDING (HCC): ICD-10-CM

## 2023-11-15 LAB
BASOPHILS # BLD: 0.05 K/UL (ref 0–0.2)
BASOPHILS NFR BLD: 1 % (ref 0–2)
EOSINOPHIL # BLD: 0.49 K/UL (ref 0.05–0.5)
EOSINOPHILS RELATIVE PERCENT: 7 % (ref 0–6)
ERYTHROCYTE [DISTWIDTH] IN BLOOD BY AUTOMATED COUNT: 13.4 % (ref 11.5–15)
ERYTHROCYTE [SEDIMENTATION RATE] IN BLOOD BY WESTERGREN METHOD: 5 MM/HR (ref 0–15)
HCT VFR BLD AUTO: 47.1 % (ref 37–54)
HGB BLD-MCNC: 16.6 G/DL (ref 12.5–16.5)
IMM GRANULOCYTES # BLD AUTO: <0.03 K/UL (ref 0–0.58)
IMM GRANULOCYTES NFR BLD: 0 % (ref 0–5)
LYMPHOCYTES NFR BLD: 1.86 K/UL (ref 1.5–4)
LYMPHOCYTES RELATIVE PERCENT: 28 % (ref 20–42)
MCH RBC QN AUTO: 29.3 PG (ref 26–35)
MCHC RBC AUTO-ENTMCNC: 35.2 G/DL (ref 32–34.5)
MCV RBC AUTO: 83.1 FL (ref 80–99.9)
MONOCYTES NFR BLD: 1.03 K/UL (ref 0.1–0.95)
MONOCYTES NFR BLD: 15 % (ref 2–12)
NEUTROPHILS NFR BLD: 49 % (ref 43–80)
NEUTS SEG NFR BLD: 3.31 K/UL (ref 1.8–7.3)
PLATELET # BLD AUTO: 231 K/UL (ref 130–450)
PMV BLD AUTO: 10.2 FL (ref 7–12)
RBC # BLD AUTO: 5.67 M/UL (ref 3.8–5.8)
WBC OTHER # BLD: 6.8 K/UL (ref 4.5–11.5)

## 2023-11-15 PROCEDURE — 36415 COLL VENOUS BLD VENIPUNCTURE: CPT

## 2023-11-15 PROCEDURE — 85652 RBC SED RATE AUTOMATED: CPT

## 2023-11-15 PROCEDURE — 85025 COMPLETE CBC W/AUTO DIFF WBC: CPT

## 2023-11-15 PROCEDURE — 86140 C-REACTIVE PROTEIN: CPT

## 2023-11-16 LAB — CRP SERPL HS-MCNC: <3 MG/L (ref 0–5)

## 2023-11-17 ENCOUNTER — HOSPITAL ENCOUNTER (OUTPATIENT)
Age: 23
Discharge: HOME | End: 2023-11-17
Payer: COMMERCIAL

## 2023-11-17 DIAGNOSIS — K50.111 CROHN'S DISEASE OF LARGE INTESTINE WITH RECTAL BLEEDING (HCC): ICD-10-CM

## 2023-11-17 PROCEDURE — 87045 FECES CULTURE AEROBIC BACT: CPT

## 2023-11-17 PROCEDURE — 87324 CLOSTRIDIUM AG IA: CPT

## 2023-11-17 PROCEDURE — 87046 STOOL CULTR AEROBIC BACT EA: CPT

## 2023-11-17 PROCEDURE — 87427 SHIGA-LIKE TOXIN AG IA: CPT

## 2023-11-17 PROCEDURE — 87449 NOS EACH ORGANISM AG IA: CPT

## 2023-11-17 PROCEDURE — 36415 COLL VENOUS BLD VENIPUNCTURE: CPT

## 2023-11-18 LAB
C DIFF GDH + TOXINS A+B STL QL IA.RAPID: NEGATIVE
SPECIMEN DESCRIPTION: NORMAL

## 2023-11-19 LAB
MICROORGANISM SPEC CULT: ABNORMAL
MICROORGANISM SPEC CULT: ABNORMAL
SPECIMEN DESCRIPTION: ABNORMAL

## 2023-12-29 RX ORDER — M-VIT,TX,IRON,MINS/CALC/FOLIC 27MG-0.4MG
1 TABLET ORAL DAILY
COMMUNITY

## 2023-12-29 NOTE — PROGRESS NOTES
[facility]   PRE-ADMISSION TESTING GENERAL INSTRUCTIONS  PAT Phone Number: [phone]      GENERAL INSTRUCTIONS:    [x] Antibacterial Soap Shower Night before and/or AM of surgery.    [x] Follow bowel prep instructions per surgeon.  No liquids/jello that are red or purple color.      [x] Do not wear makeup, lotions, powders, deodorant.   [x] Nothing by mouth after midnight; including gum, candy, mints, or water.  [x] You may brush your teeth, gargle, but do NOT swallow water.   [x] No tobacco products, illegal drugs, marijuana or alcohol within 24 hours of your surgery.  [x] Jewelry or valuables should not be brought to the hospital. All body and/or tongue piercing's must be removed prior to arriving to hospital. No contact lens or hair pins.   [x] Arrange transportation with a responsible adult  to and from the hospital. Arrange for someone to be with you for the remainder of the day and for 24 hours after your procedure due to having had anesthesia.          -Who will be your  for transportation?  parents        -Who will be staying with you for 24 hrs after your procedure?  parents  [x] Bring insurance card and photo ID.       PARKING INSTRUCTIONS:     [x] ARRIVAL DATE  1/9 & TIME   6:15 am:   [x]Surgery time may change, if it does we will call you the business day before your scheduled surgery.  [x] Enter into the [facility] Entrance. Two adults may accompany you.   [x] Parking lot '\"I\"  is located on [facility] (the corner of [facility]).  To enter the lot,you will need to get a parking ticket at the gate .  To exit you will be given a free parking voucher.  You will need both the ticket and parking voucher to exit the parking lot. One car per patient is allowed to park in this lot.   Walk up the front walk to the [place] Entrance, the door will be locked an employee will greet you and let you in.           EDUCATION INSTRUCTIONS:        [x]

## 2024-01-08 ENCOUNTER — ANESTHESIA EVENT (OUTPATIENT)
Dept: ENDOSCOPY | Age: 24
End: 2024-01-08
Payer: COMMERCIAL

## 2024-01-09 ENCOUNTER — HOSPITAL ENCOUNTER (OUTPATIENT)
Age: 24
Setting detail: OUTPATIENT SURGERY
Discharge: HOME | End: 2024-01-09
Attending: STUDENT IN AN ORGANIZED HEALTH CARE EDUCATION/TRAINING PROGRAM | Admitting: STUDENT IN AN ORGANIZED HEALTH CARE EDUCATION/TRAINING PROGRAM
Payer: COMMERCIAL

## 2024-01-09 ENCOUNTER — ANESTHESIA (OUTPATIENT)
Dept: ENDOSCOPY | Age: 24
End: 2024-01-09
Payer: COMMERCIAL

## 2024-01-09 VITALS
DIASTOLIC BLOOD PRESSURE: 72 MMHG | HEART RATE: 82 BPM | SYSTOLIC BLOOD PRESSURE: 125 MMHG | BODY MASS INDEX: 24.44 KG/M2 | TEMPERATURE: 97.8 F | RESPIRATION RATE: 20 BRPM | OXYGEN SATURATION: 98 % | HEIGHT: 69 IN | WEIGHT: 165 LBS

## 2024-01-09 DIAGNOSIS — R07.9 CHEST PAIN, UNSPECIFIED: ICD-10-CM

## 2024-01-09 DIAGNOSIS — K50.111 CROHN'S DISEASE OF COLON WITH RECTAL BLEEDING (HCC): Primary | ICD-10-CM

## 2024-01-09 DIAGNOSIS — K50.111 CROHN'S DISEASE OF LARGE INTESTINE WITH RECTAL BLEEDING (HCC): ICD-10-CM

## 2024-01-09 PROCEDURE — 3609012400 HC EGD TRANSORAL BIOPSY SINGLE/MULTIPLE: Performed by: STUDENT IN AN ORGANIZED HEALTH CARE EDUCATION/TRAINING PROGRAM

## 2024-01-09 PROCEDURE — 3609027000 HC COLONOSCOPY: Performed by: STUDENT IN AN ORGANIZED HEALTH CARE EDUCATION/TRAINING PROGRAM

## 2024-01-09 PROCEDURE — 7100000010 HC PHASE II RECOVERY - FIRST 15 MIN: Performed by: STUDENT IN AN ORGANIZED HEALTH CARE EDUCATION/TRAINING PROGRAM

## 2024-01-09 PROCEDURE — 2580000003 HC RX 258: Performed by: NURSE ANESTHETIST, CERTIFIED REGISTERED

## 2024-01-09 PROCEDURE — 3700000000 HC ANESTHESIA ATTENDED CARE: Performed by: STUDENT IN AN ORGANIZED HEALTH CARE EDUCATION/TRAINING PROGRAM

## 2024-01-09 PROCEDURE — 45380 COLONOSCOPY AND BIOPSY: CPT | Performed by: STUDENT IN AN ORGANIZED HEALTH CARE EDUCATION/TRAINING PROGRAM

## 2024-01-09 PROCEDURE — 88305 TISSUE EXAM BY PATHOLOGIST: CPT

## 2024-01-09 PROCEDURE — 43239 EGD BIOPSY SINGLE/MULTIPLE: CPT | Performed by: STUDENT IN AN ORGANIZED HEALTH CARE EDUCATION/TRAINING PROGRAM

## 2024-01-09 PROCEDURE — 45385 COLONOSCOPY W/LESION REMOVAL: CPT | Performed by: STUDENT IN AN ORGANIZED HEALTH CARE EDUCATION/TRAINING PROGRAM

## 2024-01-09 PROCEDURE — 2709999900 HC NON-CHARGEABLE SUPPLY: Performed by: STUDENT IN AN ORGANIZED HEALTH CARE EDUCATION/TRAINING PROGRAM

## 2024-01-09 PROCEDURE — 7100000011 HC PHASE II RECOVERY - ADDTL 15 MIN: Performed by: STUDENT IN AN ORGANIZED HEALTH CARE EDUCATION/TRAINING PROGRAM

## 2024-01-09 PROCEDURE — 6360000002 HC RX W HCPCS: Performed by: NURSE ANESTHETIST, CERTIFIED REGISTERED

## 2024-01-09 PROCEDURE — 3700000001 HC ADD 15 MINUTES (ANESTHESIA): Performed by: STUDENT IN AN ORGANIZED HEALTH CARE EDUCATION/TRAINING PROGRAM

## 2024-01-09 RX ORDER — ONDANSETRON 2 MG/ML
4 INJECTION INTRAMUSCULAR; INTRAVENOUS EVERY 6 HOURS PRN
Status: CANCELLED | OUTPATIENT
Start: 2024-01-09

## 2024-01-09 RX ORDER — SODIUM CHLORIDE 9 MG/ML
INJECTION, SOLUTION INTRAVENOUS CONTINUOUS PRN
Status: DISCONTINUED | OUTPATIENT
Start: 2024-01-09 | End: 2024-01-09 | Stop reason: SDUPTHER

## 2024-01-09 RX ORDER — LIDOCAINE HYDROCHLORIDE 20 MG/ML
INJECTION, SOLUTION INTRAVENOUS PRN
Status: DISCONTINUED | OUTPATIENT
Start: 2024-01-09 | End: 2024-01-09 | Stop reason: SDUPTHER

## 2024-01-09 RX ORDER — PANTOPRAZOLE SODIUM 40 MG/1
40 TABLET, DELAYED RELEASE ORAL
Qty: 30 TABLET | Refills: 11 | Status: SHIPPED | OUTPATIENT
Start: 2024-01-09

## 2024-01-09 RX ORDER — ONDANSETRON 4 MG/1
4 TABLET, ORALLY DISINTEGRATING ORAL EVERY 8 HOURS PRN
Status: CANCELLED | OUTPATIENT
Start: 2024-01-09

## 2024-01-09 RX ORDER — SODIUM CHLORIDE 0.9 % (FLUSH) 0.9 %
5-40 SYRINGE (ML) INJECTION PRN
Status: CANCELLED | OUTPATIENT
Start: 2024-01-09

## 2024-01-09 RX ORDER — FENTANYL CITRATE 50 UG/ML
INJECTION, SOLUTION INTRAMUSCULAR; INTRAVENOUS PRN
Status: DISCONTINUED | OUTPATIENT
Start: 2024-01-09 | End: 2024-01-09 | Stop reason: SDUPTHER

## 2024-01-09 RX ORDER — SODIUM CHLORIDE 9 MG/ML
INJECTION, SOLUTION INTRAVENOUS PRN
Status: CANCELLED | OUTPATIENT
Start: 2024-01-09

## 2024-01-09 RX ORDER — PROPOFOL 10 MG/ML
INJECTION, EMULSION INTRAVENOUS PRN
Status: DISCONTINUED | OUTPATIENT
Start: 2024-01-09 | End: 2024-01-09 | Stop reason: SDUPTHER

## 2024-01-09 RX ORDER — SODIUM CHLORIDE 0.9 % (FLUSH) 0.9 %
5-40 SYRINGE (ML) INJECTION EVERY 12 HOURS SCHEDULED
Status: CANCELLED | OUTPATIENT
Start: 2024-01-09

## 2024-01-09 RX ADMIN — FENTANYL CITRATE 50 MCG: 50 INJECTION, SOLUTION INTRAMUSCULAR; INTRAVENOUS at 07:55

## 2024-01-09 RX ADMIN — LIDOCAINE HYDROCHLORIDE 50 MG: 20 INJECTION, SOLUTION INTRAVENOUS at 07:46

## 2024-01-09 RX ADMIN — FENTANYL CITRATE 50 MCG: 50 INJECTION, SOLUTION INTRAMUSCULAR; INTRAVENOUS at 07:46

## 2024-01-09 RX ADMIN — SODIUM CHLORIDE: 0.9 INJECTION, SOLUTION INTRAVENOUS at 07:43

## 2024-01-09 RX ADMIN — PROPOFOL 800 MG: 10 INJECTION, EMULSION INTRAVENOUS at 07:46

## 2024-01-09 ASSESSMENT — PAIN - FUNCTIONAL ASSESSMENT: PAIN_FUNCTIONAL_ASSESSMENT: 0-10

## 2024-01-09 NOTE — BRIEF OP NOTE
Brief Postoperative Note      Patient: Eder Guerrero  YOB: 2000  MRN: [id number]    Date of Procedure: 1/9/2024    Pre-Op Diagnosis Codes:     * Crohn's disease of large intestine with rectal bleeding (HCC) [K50.111]     * Chest pain, unspecified [R07.9]    Post-Op Diagnosis: Same        Procedure(s):  EGD BIOPSY  COLONOSCOPY DIAGNOSTIC    Surgeon(s):  Dariel Lord MD    Assistant:  * No surgical staff found *    Anesthesia: Monitor Anesthesia Care    Estimated Blood Loss (mL): Minimal    Complications: None    Specimens:   ID Type Source Tests Collected by Time Destination   A : DUODENUM BIOPSY Tissue Duodenum SURGICAL PATHOLOGY Dariel Lord MD 1/9/2024 0755    B : STOMCH ANTRUM BOPSY R/O H PYLORI Tissue Stomach SURGICAL PATHOLOGY Dariel Lord MD 1/9/2024 0756    C : TERMINAL COLON BIOPSY Tissue Colon SURGICAL PATHOLOGY Dariel Lord MD 1/9/2024 0800    D : RIGHT COLON BIOPSY Tissue Colon SURGICAL PATHOLOGY Dariel Lord MD 1/9/2024 0801    E : TRANSVERSE COLON BIOPSY Tissue Colon SURGICAL PATHOLOGY Dariel Lord MD 1/9/2024 0802    F : LEFT COLON BIOPSY Tissue Colon SURGICAL PATHOLOGY Dariel Lord MD 1/9/2024 0802    G : RECTUM BIOPSY Tissue Colon SURGICAL PATHOLOGY Dariel Lord MD 1/9/2024 0803    H : CECUM POLYP Tissue Colon SURGICAL PATHOLOGY Dariel Lord MD 1/9/2024 0811    I : LEFT COLON POLYP Tissue Colon SURGICAL PATHOLOGY Dariel Lord MD 1/9/2024 0818        Implants:  * No implants in log *      Drains: * No LDAs found *    Findings:     EGD:  Normal esophagus with LA Grade A esophagitis in the GE junction.  Mild antral gastritis. Biopsied.  Normal duodenum. Biopsied.    Colonoscopy:  Mild erythema and edema in terminal ileum. Biopsied.  Mild erythema, edema, and granularity throughout entire examined colon. No active inflammation, spontaneous bleeding, or ulceration appreciated. Biopsied throughout.  Pseudopolyps in cecum and descending colon. Resected with biopsy forceps

## 2024-01-09 NOTE — H&P
[facility]   Gastroenterology, Hepatology, &  Advanced Endoscopy    Consult       ASSESSMENT AND PLAN:    22y/M w/ chest discomfort and Crohn's Disease presents for endoscopic evaluation.    PLAN:  EGD and Colonoscopy        HISTORY OF PRESENT ILLNESS:      Eder Guerrero is a 22y/M that presents today for follow up on Crohn's disease     Now working working full time as a   Has new insurance; will need to prior auth Humira again     On Humira injections every other week and Azathioprine daily  Having 3 BM's/day  Denies BRBPR; occasional loose stools  No abdominal pain, joint pain, or weight loss  Patient got the COVID booster; plans on getting the flu vaccine  Lab work from June was unremarkable     Mentions chest pain that presented on 2 episodes  Starts out sharp to dull and will last a short time  No heartburn or acid reflux  Not a smoker and no routine NSAID use     Patient is running the [employer]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 Anorectal region from the anus at 20 cm with moderate granularity. Asacol HD 2 tablet TID, hydrocortisone suppositories in the evening for 2 weeks, small bowel follow through FU in 3 weeks    IBS (irritable bowel syndrome)      Past Surgical History:        Procedure Laterality Date    COLONOSCOPY N/A 01/04/2022    COLONOSCOPY WITH BIOPSY performed by Dariel Lord MD at [facility] ENDOSCOPY    COLONOSCOPY N/A 06/14/2022    COLONOSCOPY WITH BIOPSY performed by Dariel Lord MD at [facility] ENDOSCOPY    COLONOSCOPY  09/2021    Dr. Barroso    COLONOSCOPY N/A 1/10/2023  postnasal drip, scleral icterus, drooling, persistent bleeding from nose/mouth.  Resp: Patient denies SOB, wheezing, productive cough.  Cards: Patient denies CP, palpitations, significant edema  GI: As above.  Derm: Patient denies jaundice/rashes.   Musc: Patient denies diffuse/irregular joint swelling or myalgias.     PHYSICAL EXAM:  Ht 1.753 m (5' 9\")   Wt 74.8 kg (165 lb)   BMI 24.37 kg/m²   Physical Exam:  General: Overall well-appearing, NAD  HEENT: PERRLA, EOMI, Anicteric sclera, MMM, no rhinorrhea  Cards: RRR, no LE edema  Resp: Breathing comfortably on room air, good air movement, no use of accessory muscles, no audible wheezing  Abdomen: soft, NT, ND.   Extremities: Moves all extremities, no effusions or bruising.  Skin: No rashes or jaundice  Neuro: A&O x 3, CN grossly intact, non-focal exam               Electronically signed by Dariel Lord MD on 1/9/2024 at 5:27 AM

## 2024-01-09 NOTE — DISCHARGE INSTRUCTIONS
Recommendations:  -The patient will be observed post procedure until all discharge criteria are met.    -Patient has a contact number available for emergencies.  The signs and symptoms of potential delayed complications were discussed with the patient.    -Return to normal activities tomorrow.    -Written discharge instructions were provided to the patient.    -Begin PPI therapy.  -Labs for Humira levels and antibody to be collected prior to next injection. Ordered.   -Await pathology results.  -Timeframe until next colonoscopy will be discussed in clinic and based on Clinical Guidelines regarding size, number, and pathology of polyps removed as well as adequacy of bowel prep.   -Clinic appointment scheduled 1/23.

## 2024-01-09 NOTE — ANESTHESIA PRE PROCEDURE
Department of Anesthesiology  Preprocedure Note       Name:  Eder Guerrero   Age:  23 y.o.  :  2000                                          MRN:  [id number]         Date:  2024      Surgeon: Surgeon(s):  Dariel Lord MD    Procedure: Procedure(s):  EGD ESOPHAGOGASTRODUODENOSCOPY  COLONOSCOPY DIAGNOSTIC    Medications prior to admission:   Prior to Admission medications    Medication Sig Start Date End Date Taking? Authorizing Provider   Multiple Vitamins-Minerals (THERAPEUTIC MULTIVITAMIN-MINERALS) tablet Take 1 tablet by mouth daily   Yes Clare Ortiz MD   mesalamine (LIALDA) 1.2 g EC tablet Take 4 tablets by mouth daily (with breakfast) 10/10/23   Laura Wang APRN - CNP   adalimumab (HUMIRA PEN) 40 MG/0.4ML PNKT Inject 40 mg into the skin every 14 days 23   Laura Wang APRN - CNP   vitamin D (ERGOCALCIFEROL) 1.25 MG (12894 UT) CAPS capsule Take 1 capsule by mouth See Admin Instructions Take once capsule by mouth twice weekly 23   Laura Wang APRN - CNP   azaTHIOprine (IMURAN) 50 MG tablet Take 1 tablet by mouth daily 3/22/23   Dariel Lord MD   ketoconazole (NIZORAL) 2 % shampoo Twice a week 22   Clare Ortiz MD   fluocinonide (LIDEX) 0.05 % external solution daily as needed 22   Clare Ortiz MD   dicyclomine (BENTYL) 20 MG tablet Take 1 tablet by mouth 4 times daily as needed (abdominal pain, bloating) 22   Laura Wang APRN - CNP   famotidine (PEPCID) 40 MG tablet Take 0.5 tablets by mouth 2 times daily  Patient taking differently: Take 0.5 tablets by mouth 2 times daily as needed 22   Laura Wang APRN - CNP       Current medications:    No current facility-administered medications for this encounter.       Allergies:    Allergies   Allergen Reactions    Cattle Epithelium Extract Other (See Comments)     On allergy test    Cat Hair Extract Other (See Comments)     sneezing    Dog Epithelium Allergy Skin Test Other

## 2024-01-09 NOTE — ANESTHESIA POSTPROCEDURE EVALUATION
Department of Anesthesiology  Postprocedure Note    Patient: Eder Guerrero  MRN: [id number]  YOB: 2000  Date of evaluation: 1/9/2024    Procedure Summary       Date: 01/09/24 Room / Location: [place] [address] / [facility]    Anesthesia Start: 0743 Anesthesia Stop: 0828    Procedures:       EGD BIOPSY      COLONOSCOPY DIAGNOSTIC Diagnosis:       Crohn's disease of large intestine with rectal bleeding (HCC)      Chest pain, unspecified      (Crohn's disease of large intestine with rectal bleeding (HCC) [K50.111])      (Chest pain, unspecified [R07.9])    Surgeons: Dariel Lord MD Responsible Provider: Mustapha Montoya DO    Anesthesia Type: MAC ASA Status: 2            Anesthesia Type: No value filed.    Juan Carlos Phase I: Juan Carlos Score: 10    Juan Carlos Phase II:      Anesthesia Post Evaluation    Patient location during evaluation: bedside  Patient participation: complete - patient cannot participate  Level of consciousness: awake and alert  Airway patency: patent  Nausea & Vomiting: no nausea and no vomiting  Cardiovascular status: blood pressure returned to baseline  Respiratory status: acceptable  Hydration status: euvolemic  Multimodal analgesia pain management approach    No notable events documented.

## 2024-01-12 LAB — SURGICAL PATHOLOGY REPORT: NORMAL

## 2024-01-19 ENCOUNTER — HOSPITAL ENCOUNTER (OUTPATIENT)
Age: 24
Discharge: HOME | End: 2024-01-19
Payer: COMMERCIAL

## 2024-01-19 DIAGNOSIS — K50.111 CROHN'S DISEASE OF COLON WITH RECTAL BLEEDING (HCC): ICD-10-CM

## 2024-01-19 PROCEDURE — 36415 COLL VENOUS BLD VENIPUNCTURE: CPT

## 2024-01-20 LAB
SEND OUT REPORT: NORMAL
TEST NAME: NORMAL

## 2024-01-23 ENCOUNTER — OFFICE VISIT (OUTPATIENT)
Dept: GASTROENTEROLOGY | Age: 24
End: 2024-01-23
Payer: COMMERCIAL

## 2024-01-23 VITALS
WEIGHT: 165 LBS | TEMPERATURE: 97.2 F | BODY MASS INDEX: 24.44 KG/M2 | HEART RATE: 71 BPM | OXYGEN SATURATION: 98 % | SYSTOLIC BLOOD PRESSURE: 118 MMHG | DIASTOLIC BLOOD PRESSURE: 62 MMHG | HEIGHT: 69 IN | RESPIRATION RATE: 18 BRPM

## 2024-01-23 DIAGNOSIS — K50.111 CROHN'S DISEASE OF LARGE INTESTINE WITH RECTAL BLEEDING (HCC): Primary | ICD-10-CM

## 2024-01-23 DIAGNOSIS — K50.111 CROHN'S DISEASE OF LARGE INTESTINE WITH RECTAL BLEEDING (HCC): ICD-10-CM

## 2024-01-23 LAB
ABSOLUTE IMMATURE GRANULOCYTE: <0.03 K/UL (ref 0–0.58)
ALBUMIN SERPL-MCNC: 4.5 G/DL (ref 3.5–5.2)
ALP BLD-CCNC: 121 U/L (ref 40–129)
ALT SERPL-CCNC: 28 U/L (ref 0–40)
ANION GAP SERPL CALCULATED.3IONS-SCNC: 15 MMOL/L (ref 7–16)
AST SERPL-CCNC: 26 U/L (ref 0–39)
BASOPHILS ABSOLUTE: 0.06 K/UL (ref 0–0.2)
BASOPHILS RELATIVE PERCENT: 1 % (ref 0–2)
BILIRUB SERPL-MCNC: 0.5 MG/DL (ref 0–1.2)
BUN BLDV-MCNC: 11 MG/DL (ref 6–20)
C-REACTIVE PROTEIN: <3 MG/L (ref 0–5)
CALCIUM SERPL-MCNC: 9.2 MG/DL (ref 8.6–10.2)
CHLORIDE BLD-SCNC: 101 MMOL/L (ref 98–107)
CO2: 25 MMOL/L (ref 22–29)
CREAT SERPL-MCNC: 1.1 MG/DL (ref 0.7–1.2)
EOSINOPHILS ABSOLUTE: 0.22 K/UL (ref 0.05–0.5)
EOSINOPHILS RELATIVE PERCENT: 3 % (ref 0–6)
FOLATE: >20 NG/ML (ref 4.8–24.2)
GFR SERPL CREATININE-BSD FRML MDRD: >60 ML/MIN/1.73M2
GLUCOSE BLD-MCNC: 96 MG/DL (ref 74–99)
HCT VFR BLD CALC: 47.8 % (ref 37–54)
HEMOGLOBIN: 16.5 G/DL (ref 12.5–16.5)
IMMATURE GRANULOCYTES: 0 % (ref 0–5)
LYMPHOCYTES ABSOLUTE: 1.64 K/UL (ref 1.5–4)
LYMPHOCYTES RELATIVE PERCENT: 25 % (ref 20–42)
MCH RBC QN AUTO: 30.3 PG (ref 26–35)
MCHC RBC AUTO-ENTMCNC: 34.5 G/DL (ref 32–34.5)
MCV RBC AUTO: 87.7 FL (ref 80–99.9)
MONOCYTES ABSOLUTE: 1.03 K/UL (ref 0.1–0.95)
MONOCYTES RELATIVE PERCENT: 16 % (ref 2–12)
NEUTROPHILS ABSOLUTE: 3.68 K/UL (ref 1.8–7.3)
NEUTROPHILS RELATIVE PERCENT: 55 % (ref 43–80)
PDW BLD-RTO: 14.3 % (ref 11.5–15)
PLATELET # BLD: 282 K/UL (ref 130–450)
PMV BLD AUTO: 10.9 FL (ref 7–12)
POTASSIUM SERPL-SCNC: 4.6 MMOL/L (ref 3.5–5)
RBC # BLD: 5.45 M/UL (ref 3.8–5.8)
SEND OUT REPORT: NORMAL
SODIUM BLD-SCNC: 141 MMOL/L (ref 132–146)
TEST NAME: NORMAL
TOTAL PROTEIN: 8.3 G/DL (ref 6.4–8.3)
VITAMIN B-12: 574 PG/ML (ref 211–946)
VITAMIN D 25-HYDROXY: 30.7 NG/ML (ref 30–100)
WBC # BLD: 6.7 K/UL (ref 4.5–11.5)

## 2024-01-23 PROCEDURE — 99212 OFFICE O/P EST SF 10 MIN: CPT | Performed by: NURSE PRACTITIONER

## 2024-01-23 NOTE — PROGRESS NOTES
Eder Guerrero (:  2000) is a 23 y.o. male, here for evaluation of the following chief complaint(s):  Follow-up      SUBJECTIVE/OBJECTIVE:  HPI:       Eder is a very pleasant 23 year old gentleman that presents today for follow up on EGD/colonoscopy.  Patient has Crohn's disease    EGD:    Impression: LA Grade A esophagitis with no bleeding. Z-line regular. Gastritis, characterized by erythema and congestion (edema). Biopsied. Normal examined duodenum. Biopsied.       Colonoscopy:    Impression: Mild inflammation in ileum. Biopsied. Localized and diffuse mild inflammation was found in the entire examined colon. Biopsied. No significant inflammation or ulceration appreciated. Multiple small polyps consistent with pseudopolyps in the cecum and in the descending colon, removed with a cold snare. Resected and retrieved. The distal rectum and anal verge are normal on retroflexion view.    A.  Duodenal biopsy: Unremarkable duodenal mucosa. Negative for   features of celiac disease.B.  Gastric antrum, biopsy: Gastric antral   and oxyntic mucosa, no significant pathologic changes.C.  Terminal   ileum, biopsy: Small intestinal mucosa, no pathologic changes.D.   Right colon biopsy: Focal active colitis, see commentE.  Transverse   colon biopsy: Focal active colitis, see commentF.  Left colon biopsy:   Focal active colitis, see commentG.  Rectal biopsy: Focal active   colitis, see commentH.  Cecal polyp: Hyperplastic polypI.  Left colon   polyp: Hyperplastic polyp       Patient is taking Humira every 2 weeks along with Mesalamine and Azathioprine  Stools are loose/around 1 to 2 BMs a day  No rectal bleeding or abdominal pain    Patient plans on running a half marathon in the fall          ROS:  General: Patient denies n/v/f/c or weight loss.  HEENT: Patient denies persistent postnasal drip, scleral icterus, drooling, persistent bleeding from nose/mouth.  Resp: Patient denies SOB, wheezing, productive cough.  Cards:

## 2024-01-24 LAB — SEDIMENTATION RATE, ERYTHROCYTE: 12 MM/HR (ref 0–15)

## 2024-01-26 RX ORDER — ERGOCALCIFEROL 1.25 MG/1
50000 CAPSULE ORAL SEE ADMIN INSTRUCTIONS
Qty: 8 CAPSULE | Refills: 1 | Status: SHIPPED | OUTPATIENT
Start: 2024-01-26

## 2024-02-20 DIAGNOSIS — K50.111 CROHN'S DISEASE OF LARGE INTESTINE WITH RECTAL BLEEDING (HCC): Primary | ICD-10-CM

## 2024-02-23 ENCOUNTER — HOSPITAL ENCOUNTER (OUTPATIENT)
Age: 24
Discharge: HOME | End: 2024-02-23
Payer: COMMERCIAL

## 2024-02-23 DIAGNOSIS — K50.111 CROHN'S DISEASE OF LARGE INTESTINE WITH RECTAL BLEEDING (HCC): ICD-10-CM

## 2024-02-23 LAB
ALBUMIN SERPL-MCNC: 4.4 G/DL (ref 3.5–5.2)
ALP SERPL-CCNC: 131 U/L (ref 40–129)
ALT SERPL-CCNC: 29 U/L (ref 0–40)
ANION GAP SERPL CALCULATED.3IONS-SCNC: 9 MMOL/L (ref 7–16)
AST SERPL-CCNC: 21 U/L (ref 0–39)
BASOPHILS # BLD: 0.06 K/UL (ref 0–0.2)
BASOPHILS NFR BLD: 1 % (ref 0–2)
BILIRUB SERPL-MCNC: 0.4 MG/DL (ref 0–1.2)
BUN SERPL-MCNC: 13 MG/DL (ref 6–20)
CALCIUM SERPL-MCNC: 9.8 MG/DL (ref 8.6–10.2)
CHLORIDE SERPL-SCNC: 103 MMOL/L (ref 98–107)
CO2 SERPL-SCNC: 30 MMOL/L (ref 22–29)
CREAT SERPL-MCNC: 1.2 MG/DL (ref 0.7–1.2)
CRP SERPL HS-MCNC: 8 MG/L (ref 0–5)
EOSINOPHIL # BLD: 0.25 K/UL (ref 0.05–0.5)
EOSINOPHILS RELATIVE PERCENT: 3 % (ref 0–6)
ERYTHROCYTE [DISTWIDTH] IN BLOOD BY AUTOMATED COUNT: 13.6 % (ref 11.5–15)
ERYTHROCYTE [SEDIMENTATION RATE] IN BLOOD BY WESTERGREN METHOD: 7 MM/HR (ref 0–15)
GFR SERPL CREATININE-BSD FRML MDRD: >60 ML/MIN/1.73M2
GLUCOSE SERPL-MCNC: 97 MG/DL (ref 74–99)
HCT VFR BLD AUTO: 45.3 % (ref 37–54)
HGB BLD-MCNC: 16.1 G/DL (ref 12.5–16.5)
IMM GRANULOCYTES # BLD AUTO: 0.03 K/UL (ref 0–0.58)
IMM GRANULOCYTES NFR BLD: 0 % (ref 0–5)
LYMPHOCYTES NFR BLD: 1.54 K/UL (ref 1.5–4)
LYMPHOCYTES RELATIVE PERCENT: 20 % (ref 20–42)
MCH RBC QN AUTO: 30.2 PG (ref 26–35)
MCHC RBC AUTO-ENTMCNC: 35.5 G/DL (ref 32–34.5)
MCV RBC AUTO: 85 FL (ref 80–99.9)
MONOCYTES NFR BLD: 1.3 K/UL (ref 0.1–0.95)
MONOCYTES NFR BLD: 17 % (ref 2–12)
NEUTROPHILS NFR BLD: 59 % (ref 43–80)
NEUTS SEG NFR BLD: 4.56 K/UL (ref 1.8–7.3)
PLATELET # BLD AUTO: 270 K/UL (ref 130–450)
PMV BLD AUTO: 9.8 FL (ref 7–12)
POTASSIUM SERPL-SCNC: 4.1 MMOL/L (ref 3.5–5)
PROT SERPL-MCNC: 8.7 G/DL (ref 6.4–8.3)
RBC # BLD AUTO: 5.33 M/UL (ref 3.8–5.8)
SODIUM SERPL-SCNC: 142 MMOL/L (ref 132–146)
WBC OTHER # BLD: 7.7 K/UL (ref 4.5–11.5)

## 2024-02-23 PROCEDURE — 87493 C DIFF AMPLIFIED PROBE: CPT

## 2024-02-23 PROCEDURE — 87324 CLOSTRIDIUM AG IA: CPT

## 2024-02-23 PROCEDURE — 86140 C-REACTIVE PROTEIN: CPT

## 2024-02-23 PROCEDURE — 87427 SHIGA-LIKE TOXIN AG IA: CPT

## 2024-02-23 PROCEDURE — 87045 FECES CULTURE AEROBIC BACT: CPT

## 2024-02-23 PROCEDURE — 85652 RBC SED RATE AUTOMATED: CPT

## 2024-02-23 PROCEDURE — 87449 NOS EACH ORGANISM AG IA: CPT

## 2024-02-23 PROCEDURE — 36415 COLL VENOUS BLD VENIPUNCTURE: CPT

## 2024-02-23 PROCEDURE — 85025 COMPLETE CBC W/AUTO DIFF WBC: CPT

## 2024-02-23 PROCEDURE — 87046 STOOL CULTR AEROBIC BACT EA: CPT

## 2024-02-23 PROCEDURE — 80053 COMPREHEN METABOLIC PANEL: CPT

## 2024-02-25 LAB
C DIFF GDH + TOXINS A+B STL QL IA.RAPID: ABNORMAL
MICROORGANISM SPEC CULT: NORMAL
MICROORGANISM SPEC CULT: NORMAL
SPECIMEN DESCRIPTION: ABNORMAL
SPECIMEN DESCRIPTION: NORMAL

## 2024-02-26 LAB
C DIFFICILE TOXINS, PCR: POSITIVE
SPECIMEN DESCRIPTION: ABNORMAL

## 2024-02-26 RX ORDER — VANCOMYCIN HYDROCHLORIDE 250 MG/1
250 CAPSULE ORAL 4 TIMES DAILY
Qty: 56 CAPSULE | Refills: 0 | Status: SHIPPED | OUTPATIENT
Start: 2024-02-26 | End: 2024-03-11

## 2024-02-27 LAB
MICROORGANISM SPEC CULT: NORMAL
MICROORGANISM SPEC CULT: NORMAL
SPECIMEN DESCRIPTION: NORMAL

## 2024-04-04 DIAGNOSIS — K50.111 CROHN'S DISEASE OF LARGE INTESTINE WITH RECTAL BLEEDING (HCC): Primary | ICD-10-CM

## 2024-04-04 RX ORDER — AZATHIOPRINE 50 MG/1
50 TABLET ORAL DAILY
Qty: 30 TABLET | Refills: 11 | Status: SHIPPED | OUTPATIENT
Start: 2024-04-04

## 2024-04-15 DIAGNOSIS — K50.111 CROHN'S DISEASE OF COLON WITH RECTAL BLEEDING (HCC): ICD-10-CM

## 2024-04-15 RX ORDER — MESALAMINE 1.2 G/1
4800 TABLET, DELAYED RELEASE ORAL
Qty: 120 TABLET | Refills: 5 | Status: SHIPPED | OUTPATIENT
Start: 2024-04-15

## 2024-06-12 ENCOUNTER — OFFICE VISIT (OUTPATIENT)
Dept: GASTROENTEROLOGY | Age: 24
End: 2024-06-12
Payer: COMMERCIAL

## 2024-06-12 VITALS
WEIGHT: 193 LBS | DIASTOLIC BLOOD PRESSURE: 88 MMHG | SYSTOLIC BLOOD PRESSURE: 130 MMHG | HEART RATE: 72 BPM | TEMPERATURE: 97 F | OXYGEN SATURATION: 98 % | BODY MASS INDEX: 28.5 KG/M2

## 2024-06-12 DIAGNOSIS — K51.00 ULCERATIVE PANCOLITIS WITHOUT COMPLICATION (HCC): Primary | ICD-10-CM

## 2024-06-12 PROCEDURE — 99212 OFFICE O/P EST SF 10 MIN: CPT | Performed by: STUDENT IN AN ORGANIZED HEALTH CARE EDUCATION/TRAINING PROGRAM

## 2024-06-12 RX ORDER — SODIUM, POTASSIUM,MAG SULFATES 17.5-3.13G
1 SOLUTION, RECONSTITUTED, ORAL ORAL ONCE
Qty: 1 EACH | Refills: 0 | Status: SHIPPED | OUTPATIENT
Start: 2024-06-12 | End: 2024-06-12

## 2024-06-12 NOTE — PROGRESS NOTES
[facility]  Gastroenterology, Hepatology &  Advanced Endoscopy     Progress Note      SUBJECTIVE:      Mr. Eder Guerrero is a 23y/M w/ history of Ulcerative Colitis currently maintained on Humira, mesalamine, and Imuran who presents to clinic in follow-up. The patient reports that he is overall doing well. He is without regular symptoms of abdominal pain or blood in his stool. He is having solid stool and without significant diarrhea.     Colonoscopy in January showed diffuse mildly active colitis. Labwork in February showed normal ESR, elevated CRP to 8, normal kidney function, and overall normal liver function with mild elevation in Alk Phos to 131.       OBJECTIVE      Physical    VITALS:  /88 (Site: Left Upper Arm, Position: Sitting, Cuff Size: Medium Adult)   Pulse 72   Temp 97 °F (36.1 °C)   Wt 87.5 kg (193 lb)   SpO2 98%   BMI 28.50 kg/m²   Physical Exam:  General: Overall well-appearing, NAD  HEENT: PERRLA, EOMI, Anicteric sclera, MMM, no rhinorrhea  Cards: RRR, no LE edema  Resp: Breathing comfortably on room air, good air movement, no use of accessory muscles, no audible wheezing  Abdomen: soft, NT, ND.   Extremities: Moves all extremities, no effusions or bruising.  Skin: No rashes or jaundice  Neuro: A&O x 3, CN grossly intact, non-focal exam       ASSESSMENT AND PLAN      23y/M w/ history of Ulcerative Colitis presents to clinic in follow-up.    PLAN:  - Continue current maintenance medications.  - Will plan on labs and re-staging colonoscopy in 6 months.   - Will need to perform colonoscopy sooner in the setting of symptom change.    I will see the patient back in clinic following colonoscopy.    Thank you for including us in the care of this patient. Please do not hesitate to contact us with any additional questions or concerns.    Dariel Lord MD  Gastroenterology/Hepatology  Advanced Endoscopy

## 2024-06-14 ENCOUNTER — TELEPHONE (OUTPATIENT)
Dept: GASTROENTEROLOGY | Age: 24
End: 2024-06-14

## 2024-06-14 ENCOUNTER — PREP FOR PROCEDURE (OUTPATIENT)
Dept: GASTROENTEROLOGY | Age: 24
End: 2024-06-14

## 2024-06-14 NOTE — TELEPHONE ENCOUNTER
Prior Authorization Form:      DEMOGRAPHICS:                     Patient Name:  Hilary Guerrero  Patient :  2000            Insurance:  Payor: [employer] / Plan: [employer] ELENA [id number] / Product Type: *No Product type* /   Insurance ID Number:    Payer/Plan Subscr  Sex Relation Sub. Ins. ID Effective Group Num   1. [facility]* HILARY GUERRERO 2000 Male Self [id number]                                    [id number]         DIAGNOSIS & PROCEDURE:                       Procedure/Operation: colonoscopy           CPT Code: 79675    Diagnosis:  crohn's    ICD10 Code: k50.111    Location:  [facility]    Surgeon:  pati    SCHEDULING INFORMATION:                          Date: 2025    Time: 730              Anesthesia:  MAC/TIVA                                                       Status:  Outpatient        Special Comments:  na       Electronically signed by ANGELLA FELDMAN LPN on 2024 at 11:31 AM

## 2024-06-27 PROBLEM — K50.111 CROHN'S DISEASE OF LARGE INTESTINE WITH RECTAL BLEEDING (HCC): Status: RESOLVED | Noted: 2023-11-01 | Resolved: 2024-06-27

## 2024-06-27 PROBLEM — K50.111 CROHN'S DISEASE OF COLON WITH RECTAL BLEEDING (HCC): Status: RESOLVED | Noted: 2021-10-14 | Resolved: 2024-06-27

## 2024-06-27 PROBLEM — K51.00 ULCERATIVE PANCOLITIS WITHOUT COMPLICATION (HCC): Status: ACTIVE | Noted: 2024-06-27

## 2024-10-04 DIAGNOSIS — K50.111 CROHN'S DISEASE OF COLON WITH RECTAL BLEEDING (HCC): ICD-10-CM

## 2024-10-07 RX ORDER — MESALAMINE 1.2 G/1
TABLET, DELAYED RELEASE ORAL
Qty: 120 TABLET | Refills: 2 | Status: SHIPPED | OUTPATIENT
Start: 2024-10-07

## 2024-10-15 DIAGNOSIS — K50.111 CROHN'S DISEASE OF COLON WITH RECTAL BLEEDING (HCC): ICD-10-CM

## 2024-10-15 RX ORDER — MESALAMINE 1.2 G/1
4800 TABLET, DELAYED RELEASE ORAL
Qty: 120 TABLET | Refills: 5 | Status: SHIPPED | OUTPATIENT
Start: 2024-10-15

## 2024-10-18 ENCOUNTER — TELEPHONE (OUTPATIENT)
Age: 24
End: 2024-10-18

## 2024-10-18 DIAGNOSIS — K52.9 COLITIS: ICD-10-CM

## 2024-10-18 NOTE — TELEPHONE ENCOUNTER
Pt called the office today asking about his Humira, if we got it sent in. Pt was told that we will get in touch with him on Monday since Laura isn't in the office today. Patient also asked about if we could send in a new sharps container since the one he has is full. Pt was told that we do not provide them and he would have to call a Humira [facility] and ask if they can provide them still. Patient verbally agreed and understood. Electronically signed by Bettina Vizcarra MA on 10/18/24 at 10:35 AM EDT

## 2024-10-21 DIAGNOSIS — K50.111 CROHN'S DISEASE OF COLON WITH RECTAL BLEEDING (HCC): ICD-10-CM

## 2024-10-21 RX ORDER — ADALIMUMAB 40MG/0.4ML
40 KIT SUBCUTANEOUS
Qty: 2 EACH | Refills: 6 | Status: SHIPPED | OUTPATIENT
Start: 2024-10-21

## 2024-10-22 ENCOUNTER — TELEPHONE (OUTPATIENT)
Age: 24
End: 2024-10-22

## 2024-10-22 RX ORDER — FAMOTIDINE 40 MG/1
20 TABLET, FILM COATED ORAL 2 TIMES DAILY
Qty: 30 TABLET | Refills: 3 | Status: SHIPPED | OUTPATIENT
Start: 2024-10-22

## 2024-10-22 RX ORDER — DICYCLOMINE HCL 20 MG
20 TABLET ORAL 4 TIMES DAILY PRN
Qty: 60 TABLET | Refills: 5 | Status: SHIPPED | OUTPATIENT
Start: 2024-10-22

## 2024-10-22 NOTE — TELEPHONE ENCOUNTER
Humira rx was sent to [facility] instead of [facility].   Called [facility] with a verbal to Riana, pharmacist at [facility]. Electronically signed by ANGELLA FELDMAN LPN on 10/22/2024 at 10:12 AM    REF # for verbal rx - [id number]

## 2024-11-07 ENCOUNTER — OFFICE VISIT (OUTPATIENT)
Dept: FAMILY MEDICINE CLINIC | Age: 24
End: 2024-11-07

## 2024-11-07 VITALS
TEMPERATURE: 98.2 F | OXYGEN SATURATION: 99 % | HEART RATE: 76 BPM | DIASTOLIC BLOOD PRESSURE: 80 MMHG | WEIGHT: 195 LBS | BODY MASS INDEX: 28.88 KG/M2 | HEIGHT: 69 IN | RESPIRATION RATE: 20 BRPM | SYSTOLIC BLOOD PRESSURE: 124 MMHG

## 2024-11-07 DIAGNOSIS — Z13.220 ENCOUNTER FOR LIPID SCREENING FOR CARDIOVASCULAR DISEASE: ICD-10-CM

## 2024-11-07 DIAGNOSIS — Z13.6 ENCOUNTER FOR LIPID SCREENING FOR CARDIOVASCULAR DISEASE: ICD-10-CM

## 2024-11-07 DIAGNOSIS — Z00.00 ENCOUNTER FOR WELL ADULT EXAM WITHOUT ABNORMAL FINDINGS: Primary | ICD-10-CM

## 2024-11-07 DIAGNOSIS — Z23 NEEDS FLU SHOT: ICD-10-CM

## 2024-11-07 SDOH — ECONOMIC STABILITY: INCOME INSECURITY: HOW HARD IS IT FOR YOU TO PAY FOR THE VERY BASICS LIKE FOOD, HOUSING, MEDICAL CARE, AND HEATING?: NOT HARD AT ALL

## 2024-11-07 SDOH — ECONOMIC STABILITY: FOOD INSECURITY: WITHIN THE PAST 12 MONTHS, THE FOOD YOU BOUGHT JUST DIDN'T LAST AND YOU DIDN'T HAVE MONEY TO GET MORE.: NEVER TRUE

## 2024-11-07 SDOH — ECONOMIC STABILITY: FOOD INSECURITY: WITHIN THE PAST 12 MONTHS, YOU WORRIED THAT YOUR FOOD WOULD RUN OUT BEFORE YOU GOT MONEY TO BUY MORE.: NEVER TRUE

## 2024-11-07 ASSESSMENT — ENCOUNTER SYMPTOMS
RHINORRHEA: 0
BACK PAIN: 0
NAUSEA: 0
CONSTIPATION: 0
DIARRHEA: 0
COLOR CHANGE: 0
SINUS PRESSURE: 0
ABDOMINAL PAIN: 0
COUGH: 0
VOMITING: 0
WHEEZING: 0
SHORTNESS OF BREATH: 0
FACIAL SWELLING: 0
CHEST TIGHTNESS: 0
SORE THROAT: 0
SINUS PAIN: 0
VOICE CHANGE: 0
TROUBLE SWALLOWING: 0

## 2024-11-07 ASSESSMENT — PATIENT HEALTH QUESTIONNAIRE - PHQ9
2. FEELING DOWN, DEPRESSED OR HOPELESS: NOT AT ALL
SUM OF ALL RESPONSES TO PHQ QUESTIONS 1-9: 0
SUM OF ALL RESPONSES TO PHQ QUESTIONS 1-9: 0
2. FEELING DOWN, DEPRESSED OR HOPELESS: NOT AT ALL
1. LITTLE INTEREST OR PLEASURE IN DOING THINGS: NOT AT ALL
1. LITTLE INTEREST OR PLEASURE IN DOING THINGS: NOT AT ALL
SUM OF ALL RESPONSES TO PHQ9 QUESTIONS 1 & 2: 0
SUM OF ALL RESPONSES TO PHQ9 QUESTIONS 1 & 2: 0
SUM OF ALL RESPONSES TO PHQ QUESTIONS 1-9: 0
SUM OF ALL RESPONSES TO PHQ QUESTIONS 1-9: 0

## 2024-11-07 NOTE — ASSESSMENT & PLAN NOTE
Flu vaccine:   Wash your hands regularly, and keep your hands away from your face.  Cover your mouth when you cough or sneeze.  Rest, plenty of fluids, Tylenol for body aches, fever.  Stay home from school, work, and other public places until you are feeling better and your fever has been gone for at least 24 hours. The fever needs to have gone away on its own without the help of medicine.  Ask people living with you to talk to their doctors about preventing the flu. They may get antiviral medicine to keep from getting the flu from you.  To prevent the flu in the future, get a flu vaccine every fall. Encourage people living with you to get the vaccine.    Minor reactions soreness, redness, or swelling at the site the shot was given.  -hoarseness, sore, red or itchy eyes  -cough, fever, aching, headache, fatigue or itching can occur and can begin  Soon after the shot and last 1 or 2 days.  -Some people get severe pain in the shoulder and have difficulty moving  The arm where the shot was given, this happens rarely.    -Signs of severe reaction occur rarely can include: very high fever, or unusual behavior, hives, swelling of the face and throat, difficulty breathing, fast heartbeat, dizziness and weakness usually occur within a few minutes to a few hours after the vaccination if these occur CALL 911 and go to the nearest emergency room.

## 2024-11-07 NOTE — PROGRESS NOTES
Well Adult Note  Name: Eder Guerrero Today’s Date: 2024   MRN: [id number] Sex: Male   Age: 23 y.o. Ethnicity: Non- / Non    : 2000 Race: White (non-)      Eder Guerrero is here for a well adult exam.       Assessment & Plan   Encounter for well adult exam without abnormal findings  Encounter for lipid screening for cardiovascular disease  -     Lipid Panel; Future  Needs flu shot  Assessment & Plan:  Flu vaccine:   Wash your hands regularly, and keep your hands away from your face.  Cover your mouth when you cough or sneeze.  Rest, plenty of fluids, Tylenol for body aches, fever.  Stay home from school, work, and other public places until you are feeling better and your fever has been gone for at least 24 hours. The fever needs to have gone away on its own without the help of medicine.  Ask people living with you to talk to their doctors about preventing the flu. They may get antiviral medicine to keep from getting the flu from you.  To prevent the flu in the future, get a flu vaccine every fall. Encourage people living with you to get the vaccine.    Minor reactions soreness, redness, or swelling at the site the shot was given.  -hoarseness, sore, red or itchy eyes  -cough, fever, aching, headache, fatigue or itching can occur and can begin  Soon after the shot and last 1 or 2 days.  -Some people get severe pain in the shoulder and have difficulty moving  The arm where the shot was given, this happens rarely.    -Signs of severe reaction occur rarely can include: very high fever, or unusual behavior, hives, swelling of the face and throat, difficulty breathing, fast heartbeat, dizziness and weakness usually occur within a few minutes to a few hours after the vaccination if these occur CALL 911 and go to the nearest emergency room.     Orders:  -     Influenza, FLUCELVAX Trivalent, (age 6 mo+) IM, Preservative Free, 0.5mL      Reach and stay at a healthy weight. This will lower your

## 2024-11-07 NOTE — PATIENT INSTRUCTIONS
hands, brush your teeth twice a day, and wear a seat belt in the car.   Where can you learn more?  Go to https://www.iSkoot.net/patientEd and enter P072 to learn more about \"Well Visit, Ages 18 to 65: Care Instructions.\"  Current as of: August 6, 2023  Content Version: 14.2  © 2024 [email or web address].   Care instructions adapted under license by [employer]. If you have questions about a medical condition or this instruction, always ask your healthcare professional. [employer] disclaims any warranty or liability for your use of this information.

## 2024-12-19 ENCOUNTER — HOSPITAL ENCOUNTER (OUTPATIENT)
Age: 24
Discharge: HOME | End: 2024-12-19
Payer: COMMERCIAL

## 2024-12-19 DIAGNOSIS — Z13.220 ENCOUNTER FOR LIPID SCREENING FOR CARDIOVASCULAR DISEASE: ICD-10-CM

## 2024-12-19 DIAGNOSIS — Z13.6 ENCOUNTER FOR LIPID SCREENING FOR CARDIOVASCULAR DISEASE: ICD-10-CM

## 2024-12-19 LAB
ALBUMIN SERPL-MCNC: 4.4 G/DL (ref 3.5–5.2)
ALP SERPL-CCNC: 144 U/L (ref 40–129)
ALT SERPL-CCNC: 39 U/L (ref 0–40)
ANION GAP SERPL CALCULATED.3IONS-SCNC: 9 MMOL/L (ref 7–16)
AST SERPL-CCNC: 29 U/L (ref 0–39)
BASOPHILS # BLD: 0.03 K/UL (ref 0–0.2)
BASOPHILS NFR BLD: 1 % (ref 0–2)
BILIRUB SERPL-MCNC: 0.5 MG/DL (ref 0–1.2)
BUN SERPL-MCNC: 14 MG/DL (ref 6–20)
CALCIUM SERPL-MCNC: 9.8 MG/DL (ref 8.6–10.2)
CHLORIDE SERPL-SCNC: 102 MMOL/L (ref 98–107)
CO2 SERPL-SCNC: 27 MMOL/L (ref 22–29)
CREAT SERPL-MCNC: 1.2 MG/DL (ref 0.7–1.2)
EOSINOPHIL # BLD: 0.16 K/UL (ref 0.05–0.5)
EOSINOPHILS RELATIVE PERCENT: 3 % (ref 0–6)
ERYTHROCYTE [DISTWIDTH] IN BLOOD BY AUTOMATED COUNT: 14 % (ref 11.5–15)
ERYTHROCYTE [SEDIMENTATION RATE] IN BLOOD BY WESTERGREN METHOD: 9 MM/HR (ref 0–15)
FOLATE SERPL-MCNC: >20 NG/ML (ref 4.8–24.2)
GFR, ESTIMATED: 87 ML/MIN/1.73M2
GLUCOSE SERPL-MCNC: 92 MG/DL (ref 74–99)
HCT VFR BLD AUTO: 47.6 % (ref 37–54)
HGB BLD-MCNC: 16.1 G/DL (ref 12.5–16.5)
IMM GRANULOCYTES # BLD AUTO: <0.03 K/UL (ref 0–0.58)
IMM GRANULOCYTES NFR BLD: 0 % (ref 0–5)
LYMPHOCYTES NFR BLD: 1.32 K/UL (ref 1.5–4)
LYMPHOCYTES RELATIVE PERCENT: 25 % (ref 20–42)
MCH RBC QN AUTO: 26.9 PG (ref 26–35)
MCHC RBC AUTO-ENTMCNC: 33.8 G/DL (ref 32–34.5)
MCV RBC AUTO: 79.6 FL (ref 80–99.9)
MONOCYTES NFR BLD: 0.74 K/UL (ref 0.1–0.95)
MONOCYTES NFR BLD: 14 % (ref 2–12)
NEUTROPHILS NFR BLD: 57 % (ref 43–80)
NEUTS SEG NFR BLD: 2.94 K/UL (ref 1.8–7.3)
PLATELET # BLD AUTO: 285 K/UL (ref 130–450)
PMV BLD AUTO: 10.2 FL (ref 7–12)
POTASSIUM SERPL-SCNC: 4.7 MMOL/L (ref 3.5–5)
PROT SERPL-MCNC: 8.5 G/DL (ref 6.4–8.3)
RBC # BLD AUTO: 5.98 M/UL (ref 3.8–5.8)
SODIUM SERPL-SCNC: 138 MMOL/L (ref 132–146)
VIT B12 SERPL-MCNC: 350 PG/ML (ref 211–946)
WBC OTHER # BLD: 5.2 K/UL (ref 4.5–11.5)

## 2024-12-19 PROCEDURE — 82306 VITAMIN D 25 HYDROXY: CPT

## 2024-12-19 PROCEDURE — 85652 RBC SED RATE AUTOMATED: CPT

## 2024-12-19 PROCEDURE — 85025 COMPLETE CBC W/AUTO DIFF WBC: CPT

## 2024-12-19 PROCEDURE — 82746 ASSAY OF FOLIC ACID SERUM: CPT

## 2024-12-19 PROCEDURE — 83540 ASSAY OF IRON: CPT

## 2024-12-19 PROCEDURE — 83550 IRON BINDING TEST: CPT

## 2024-12-19 PROCEDURE — 86140 C-REACTIVE PROTEIN: CPT

## 2024-12-19 PROCEDURE — 82607 VITAMIN B-12: CPT

## 2024-12-19 PROCEDURE — 80061 LIPID PANEL: CPT

## 2024-12-19 PROCEDURE — 36415 COLL VENOUS BLD VENIPUNCTURE: CPT

## 2024-12-19 PROCEDURE — 80053 COMPREHEN METABOLIC PANEL: CPT

## 2024-12-20 LAB
25(OH)D3 SERPL-MCNC: 32.1 NG/ML (ref 30–100)
CHOLEST SERPL-MCNC: 186 MG/DL
CRP SERPL HS-MCNC: 3 MG/L (ref 0–5)
HDLC SERPL-MCNC: 37 MG/DL
IRON SATN MFR SERPL: 16 % (ref 20–55)
IRON SERPL-MCNC: 64 UG/DL (ref 59–158)
LDLC SERPL CALC-MCNC: 125 MG/DL
TIBC SERPL-MCNC: 391 UG/DL (ref 250–450)
TRIGL SERPL-MCNC: 121 MG/DL
VLDLC SERPL CALC-MCNC: 24 MG/DL

## 2025-01-08 RX ORDER — UBIDECARENONE 75 MG
50 CAPSULE ORAL DAILY
COMMUNITY

## 2025-01-08 NOTE — PROGRESS NOTES
[facility] PRE-ADMISSION TESTING   COLONOSCOPY INSTRUCTIONS  PAT- Phone Number: [phone]    COLONOSCOPY INSTRUCTIONS:     [x] Bowel Prep instructions reviewed - any questions regarding your prep, please contact surgeon's office.  [x] Colonoscopy: 1-2 days prior: Clear liquids only - nothing red or purple in color.  [x] Antibacterial Soap Shower Night before AND the morning of procedure.  [x] No solid food after midnight. You may have SIPS of clear liquids up until 2 hours before your arrival time to the hospital.   [x] Do not wear makeup, lotions, powders, deodorant.   [x] No tobacco products, illegal drugs, or alcohol within 24 hours of your surgery.  [x] Jewelry or valuables should not be brought to the hospital. All body and/or tongue piercing's must be removed prior to arriving to hospital. No contact lens or hair pins.   [x] Urine Pregnancy test will be preformed the day of surgery. A specimen sample may be brought from home.  [x] Arrange transportation with a responsible adult  to and from the hospital. If you do not have a responsible adult  to transport you, you will need to make arrangements with a medical transportation company. Arrange for someone to be with you for the remainder of the day and for 24 hours after your procedure due to having had anesthesia.    -Who will be your  for transportation? Mother.  -Who will be staying with you for 24 hrs after your procedure? Mother.  [x] Bring insurance card and photo ID.  [x] Bring copy of living will or healthcare power of  papers to be placed in your electronic record.    PARKING INSTRUCTIONS:     [x] ARRIVAL DATE & TIME: 1/14/25 @ 6:15AM  [x] Times are subject to change. We will contact you the business day before surgery if that were to occur.  [x] Enter into the [facility] Entrance. Two people may accompany you. Masks are not required.  [x] Parking Lot \"I\" is where you will park. It is

## 2025-01-13 ENCOUNTER — ANESTHESIA EVENT (OUTPATIENT)
Dept: ENDOSCOPY | Age: 25
End: 2025-01-13
Payer: COMMERCIAL

## 2025-01-14 ENCOUNTER — ANESTHESIA (OUTPATIENT)
Dept: ENDOSCOPY | Age: 25
End: 2025-01-14
Payer: COMMERCIAL

## 2025-01-14 ENCOUNTER — HOSPITAL ENCOUNTER (OUTPATIENT)
Age: 25
Setting detail: OUTPATIENT SURGERY
Discharge: HOME | End: 2025-01-14
Attending: STUDENT IN AN ORGANIZED HEALTH CARE EDUCATION/TRAINING PROGRAM | Admitting: STUDENT IN AN ORGANIZED HEALTH CARE EDUCATION/TRAINING PROGRAM
Payer: COMMERCIAL

## 2025-01-14 VITALS
DIASTOLIC BLOOD PRESSURE: 83 MMHG | WEIGHT: 190 LBS | SYSTOLIC BLOOD PRESSURE: 118 MMHG | HEIGHT: 69 IN | RESPIRATION RATE: 15 BRPM | OXYGEN SATURATION: 98 % | TEMPERATURE: 97 F | HEART RATE: 64 BPM | BODY MASS INDEX: 28.14 KG/M2

## 2025-01-14 DIAGNOSIS — K50.111 CROHN'S DISEASE OF LARGE INTESTINE WITH RECTAL BLEEDING (HCC): ICD-10-CM

## 2025-01-14 PROBLEM — K50.10 CROHN'S COLITIS, WITHOUT COMPLICATIONS (HCC): Status: ACTIVE | Noted: 2021-11-20

## 2025-01-14 PROCEDURE — 6360000002 HC RX W HCPCS

## 2025-01-14 PROCEDURE — 3700000001 HC ADD 15 MINUTES (ANESTHESIA): Performed by: STUDENT IN AN ORGANIZED HEALTH CARE EDUCATION/TRAINING PROGRAM

## 2025-01-14 PROCEDURE — 45385 COLONOSCOPY W/LESION REMOVAL: CPT | Performed by: STUDENT IN AN ORGANIZED HEALTH CARE EDUCATION/TRAINING PROGRAM

## 2025-01-14 PROCEDURE — 45380 COLONOSCOPY AND BIOPSY: CPT | Performed by: STUDENT IN AN ORGANIZED HEALTH CARE EDUCATION/TRAINING PROGRAM

## 2025-01-14 PROCEDURE — 3609010300 HC COLONOSCOPY W/BIOPSY SINGLE/MULTIPLE: Performed by: STUDENT IN AN ORGANIZED HEALTH CARE EDUCATION/TRAINING PROGRAM

## 2025-01-14 PROCEDURE — 3700000000 HC ANESTHESIA ATTENDED CARE: Performed by: STUDENT IN AN ORGANIZED HEALTH CARE EDUCATION/TRAINING PROGRAM

## 2025-01-14 PROCEDURE — 2709999900 HC NON-CHARGEABLE SUPPLY: Performed by: STUDENT IN AN ORGANIZED HEALTH CARE EDUCATION/TRAINING PROGRAM

## 2025-01-14 PROCEDURE — 2580000003 HC RX 258

## 2025-01-14 PROCEDURE — 7100000010 HC PHASE II RECOVERY - FIRST 15 MIN: Performed by: STUDENT IN AN ORGANIZED HEALTH CARE EDUCATION/TRAINING PROGRAM

## 2025-01-14 PROCEDURE — 7100000011 HC PHASE II RECOVERY - ADDTL 15 MIN: Performed by: STUDENT IN AN ORGANIZED HEALTH CARE EDUCATION/TRAINING PROGRAM

## 2025-01-14 PROCEDURE — 2500000003 HC RX 250 WO HCPCS

## 2025-01-14 PROCEDURE — 88305 TISSUE EXAM BY PATHOLOGIST: CPT

## 2025-01-14 RX ORDER — DIPHENHYDRAMINE HYDROCHLORIDE 50 MG/ML
12.5 INJECTION INTRAMUSCULAR; INTRAVENOUS
Status: CANCELLED | OUTPATIENT
Start: 2025-01-14 | End: 2025-01-15

## 2025-01-14 RX ORDER — MEPERIDINE HYDROCHLORIDE 25 MG/ML
12.5 INJECTION INTRAMUSCULAR; INTRAVENOUS; SUBCUTANEOUS
Status: CANCELLED | OUTPATIENT
Start: 2025-01-14

## 2025-01-14 RX ORDER — NALOXONE HYDROCHLORIDE 0.4 MG/ML
INJECTION, SOLUTION INTRAMUSCULAR; INTRAVENOUS; SUBCUTANEOUS PRN
Status: CANCELLED | OUTPATIENT
Start: 2025-01-14

## 2025-01-14 RX ORDER — MIDAZOLAM HYDROCHLORIDE 1 MG/ML
INJECTION, SOLUTION INTRAMUSCULAR; INTRAVENOUS
Status: DISCONTINUED | OUTPATIENT
Start: 2025-01-14 | End: 2025-01-14 | Stop reason: SDUPTHER

## 2025-01-14 RX ORDER — SODIUM CHLORIDE 9 MG/ML
INJECTION, SOLUTION INTRAVENOUS PRN
Status: CANCELLED | OUTPATIENT
Start: 2025-01-14

## 2025-01-14 RX ORDER — SODIUM CHLORIDE 0.9 % (FLUSH) 0.9 %
5-40 SYRINGE (ML) INJECTION EVERY 12 HOURS SCHEDULED
Status: CANCELLED | OUTPATIENT
Start: 2025-01-14

## 2025-01-14 RX ORDER — HYDROMORPHONE HYDROCHLORIDE 1 MG/ML
0.25 INJECTION, SOLUTION INTRAMUSCULAR; INTRAVENOUS; SUBCUTANEOUS EVERY 5 MIN PRN
Status: CANCELLED | OUTPATIENT
Start: 2025-01-14

## 2025-01-14 RX ORDER — ONDANSETRON 2 MG/ML
4 INJECTION INTRAMUSCULAR; INTRAVENOUS EVERY 6 HOURS PRN
Status: CANCELLED | OUTPATIENT
Start: 2025-01-14

## 2025-01-14 RX ORDER — ONDANSETRON 2 MG/ML
4 INJECTION INTRAMUSCULAR; INTRAVENOUS
Status: CANCELLED | OUTPATIENT
Start: 2025-01-14 | End: 2025-01-15

## 2025-01-14 RX ORDER — SODIUM CHLORIDE 0.9 % (FLUSH) 0.9 %
5-40 SYRINGE (ML) INJECTION PRN
Status: CANCELLED | OUTPATIENT
Start: 2025-01-14

## 2025-01-14 RX ORDER — LABETALOL HYDROCHLORIDE 5 MG/ML
5 INJECTION, SOLUTION INTRAVENOUS
Status: CANCELLED | OUTPATIENT
Start: 2025-01-14

## 2025-01-14 RX ORDER — ONDANSETRON 4 MG/1
4 TABLET, ORALLY DISINTEGRATING ORAL EVERY 8 HOURS PRN
Status: CANCELLED | OUTPATIENT
Start: 2025-01-14

## 2025-01-14 RX ORDER — HYDROMORPHONE HYDROCHLORIDE 1 MG/ML
0.5 INJECTION, SOLUTION INTRAMUSCULAR; INTRAVENOUS; SUBCUTANEOUS EVERY 5 MIN PRN
Status: CANCELLED | OUTPATIENT
Start: 2025-01-14

## 2025-01-14 RX ORDER — PROCHLORPERAZINE EDISYLATE 5 MG/ML
5 INJECTION INTRAMUSCULAR; INTRAVENOUS
Status: CANCELLED | OUTPATIENT
Start: 2025-01-14 | End: 2025-01-15

## 2025-01-14 RX ORDER — IPRATROPIUM BROMIDE AND ALBUTEROL SULFATE 2.5; .5 MG/3ML; MG/3ML
1 SOLUTION RESPIRATORY (INHALATION)
Status: CANCELLED | OUTPATIENT
Start: 2025-01-14 | End: 2025-01-15

## 2025-01-14 RX ORDER — SODIUM CHLORIDE 9 MG/ML
INJECTION, SOLUTION INTRAVENOUS
Status: DISCONTINUED | OUTPATIENT
Start: 2025-01-14 | End: 2025-01-14 | Stop reason: SDUPTHER

## 2025-01-14 RX ORDER — DEXMEDETOMIDINE HYDROCHLORIDE 100 UG/ML
INJECTION, SOLUTION INTRAVENOUS
Status: DISCONTINUED | OUTPATIENT
Start: 2025-01-14 | End: 2025-01-14 | Stop reason: SDUPTHER

## 2025-01-14 RX ORDER — PROPOFOL 10 MG/ML
INJECTION, EMULSION INTRAVENOUS
Status: DISCONTINUED | OUTPATIENT
Start: 2025-01-14 | End: 2025-01-14 | Stop reason: SDUPTHER

## 2025-01-14 RX ADMIN — SODIUM CHLORIDE: 9 INJECTION, SOLUTION INTRAVENOUS at 07:51

## 2025-01-14 RX ADMIN — DEXMEDETOMIDINE HCL 8 MCG: 100 INJECTION INTRAVENOUS at 08:05

## 2025-01-14 RX ADMIN — MIDAZOLAM 2 MG: 1 INJECTION INTRAMUSCULAR; INTRAVENOUS at 07:58

## 2025-01-14 RX ADMIN — PROPOFOL 75 MCG/KG/MIN: 10 INJECTION, EMULSION INTRAVENOUS at 07:59

## 2025-01-14 RX ADMIN — PROPOFOL 20 MG: 10 INJECTION, EMULSION INTRAVENOUS at 08:05

## 2025-01-14 RX ADMIN — DEXMEDETOMIDINE HCL 4 MCG: 100 INJECTION INTRAVENOUS at 07:58

## 2025-01-14 RX ADMIN — PROPOFOL 80 MG: 10 INJECTION, EMULSION INTRAVENOUS at 07:58

## 2025-01-14 ASSESSMENT — PAIN - FUNCTIONAL ASSESSMENT: PAIN_FUNCTIONAL_ASSESSMENT: 0-10

## 2025-01-14 NOTE — ANESTHESIA PRE PROCEDURE
Department of Anesthesiology  Preprocedure Note       Name:  Eder Guerrero   Age:  24 y.o.  :  2000                                          MRN:  [id number]         Date:  2025      Surgeon: Surgeon(s):  Dariel Lord MD    Procedure: Procedure(s):  COLONOSCOPY DIAGNOSTIC    Medications prior to admission:   Prior to Admission medications    Medication Sig Start Date End Date Taking? Authorizing Provider   vitamin B-12 (CYANOCOBALAMIN) 100 MCG tablet Take 0.5 tablets by mouth daily   Yes Clare Ortiz MD   dicyclomine (BENTYL) 20 MG tablet Take 1 tablet by mouth 4 times daily as needed (abdominal pain, bloating) 10/22/24   Laura Wang APRN - CNP   famotidine (PEPCID) 40 MG tablet Take 0.5 tablets by mouth 2 times daily 10/22/24   Laura Wang APRN - CNP   adalimumab (HUMIRA, 2 PEN,) 40 MG/0.4ML PNKT Inject 40 mg into the skin every 14 days 10/21/24   Laura Wang APRN - CNP   mesalamine (LIALDA) 1.2 g EC tablet Take 4 tablets by mouth daily (with breakfast) 10/15/24   Laura Wang APRN - CNP   azaTHIOprine (IMURAN) 50 MG tablet Take 1 tablet by mouth daily 24   Laura Wang APRN - CNP   vitamin D (ERGOCALCIFEROL) 1.25 MG (28577 UT) CAPS capsule Take 1 capsule by mouth See Admin Instructions Take once capsule by mouth twice weekly 24   Laura Wang APRN - CNP   pantoprazole (PROTONIX) 40 MG tablet Take 1 tablet by mouth every morning (before breakfast)  Patient taking differently: Take 1 tablet by mouth daily as needed 24   Dariel Lord MD   Multiple Vitamins-Minerals (THERAPEUTIC MULTIVITAMIN-MINERALS) tablet Take 1 tablet by mouth daily    Clare Ortiz MD   ketoconazole (NIZORAL) 2 % shampoo Twice a week 22   Clare Ortiz MD   fluocinonide (LIDEX) 0.05 % external solution daily as needed 22   Clare Ortiz MD       Current medications:    No current facility-administered medications for this encounter. 
      Allergies:    Allergies   Allergen Reactions    Cattle Epithelium Extract Other (See Comments)     On allergy test    Cat Dander Other (See Comments)     sneezing    Dog Epithelium (Canis Lupus Familiaris) Other (See Comments)     sneezing    Mixed Ragweed Itching    Seasonal Other (See Comments)     Runny nose,sneezing,coughing       Problem List:    Patient Active Problem List   Diagnosis Code    Hx of colonoscopy Z98.890    Allergic rhinitis due to pollen J30.1    Contact dermatitis L25.9    Colitis K52.9    Acute pharyngitis due to other specified organisms J02.8    Chest pain, unspecified R07.9    Needs flu shot Z23    Ulcerative pancolitis without complication (HCC) K51.00       Past Medical History:        Diagnosis Date    Allergic     Allergic rhinitis     Crohn's colitis (HCC)     Hx of colonoscopy 09/27/2021    9/15/2021 colonoscopy Dr Barroso probable Crohn's disease involving a 2 cm circumferential segment in the proximal transverse colon, mild in intension. Anorectal region from the anus at 20 cm with moderate granularity. Asacol HD 2 tablet TID, hydrocortisone suppositories in the evening for 2 weeks, small bowel follow through FU in 3 weeks    IBS (irritable bowel syndrome)     Irritable bowel syndrome        Past Surgical History:        Procedure Laterality Date    COLONOSCOPY N/A 01/04/2022    COLONOSCOPY WITH BIOPSY performed by Dariel Lord MD at [facility] ENDOSCOPY    COLONOSCOPY N/A 06/14/2022    COLONOSCOPY WITH BIOPSY performed by Dariel Lord MD at [facility] ENDOSCOPY    COLONOSCOPY  09/2021    Dr. Barroso    COLONOSCOPY N/A 1/10/2023    COLONOSCOPY POLYPECTOMY SNARE/COLD BIOPSY performed by Dariel Lord MD at [facility] ENDOSCOPY    COLONOSCOPY N/A 1/9/2024    COLONOSCOPY DIAGNOSTIC performed by Dariel Lord MD at [facility] [place]OSCOPY    TONSILLECTOMY      UPPER GASTROINTESTINAL ENDOSCOPY N/A 1/9/2024    EGD BIOPSY performed by Dariel Lord MD at [facility] ENDOSCOPY    WISDOM TOOTH EXTRACTION

## 2025-01-14 NOTE — DISCHARGE INSTRUCTIONS
Recommendations:  -The patient will be observed post procedure until all discharge criteria are met.    -Patient has a contact number available for emergencies.  The signs and symptoms of potential delayed complications were discussed with the patient.    -Return to normal activities tomorrow.    -Written discharge instructions were provided to the patient.    -Await pathology results.  -Timeframe until next colonoscopy will be discussed in clinic and based on Clinical Guidelines regarding size, number, and pathology of polyps removed as well as adequacy of bowel prep.   -Clinic appointment scheduled 1/29.

## 2025-01-14 NOTE — H&P
[facility]   Gastroenterology, Hepatology, &  Advanced Endoscopy    Consult       ASSESSMENT AND PLAN:    24y/M w/ history of Ulcerative Colitis who presents for endoscopic surveillance and staging of disease.     PLAN:  Colonoscopy        HISTORY OF PRESENT ILLNESS:      Mr. Eder Guerrero is a 24y/M w/ history of Ulcerative Colitis who presents for endoscopic surveillance and staging of disease.     Past Medical History:        Diagnosis Date    Allergic     Allergic rhinitis     Crohn's colitis (HCC)     Hx of colonoscopy 09/27/2021    9/15/2021 colonoscopy Dr Barroso probable Crohn's disease involving a 2 cm circumferential segment in the proximal transverse colon, mild in intension. Anorectal region from the anus at 20 cm with moderate granularity. Asacol HD 2 tablet TID, hydrocortisone suppositories in the evening for 2 weeks, small bowel follow through FU in 3 weeks    IBS (irritable bowel syndrome)     Irritable bowel syndrome      Past Surgical History:        Procedure Laterality Date    COLONOSCOPY N/A 01/04/2022    COLONOSCOPY WITH BIOPSY performed by Dariel Lord MD at [facility] ENDOSCOPY    COLONOSCOPY N/A 06/14/2022    COLONOSCOPY WITH BIOPSY performed by Dariel Lord MD at [facility] ENDOSCOPY    COLONOSCOPY  09/2021    Dr. Barroso    COLONOSCOPY N/A 1/10/2023    COLONOSCOPY POLYPECTOMY SNARE/COLD BIOPSY performed by Dariel Lord MD at [facility] ENDOSCOPY    COLONOSCOPY N/A 1/9/2024    COLONOSCOPY DIAGNOSTIC performed by Dariel Lord MD at [facility] ENDOSCOPY    TONSILLECTOMY      UPPER GASTROINTESTINAL ENDOSCOPY N/A 1/9/2024    EGD BIOPSY performed by Dariel Lord MD at [facility] ENDOSCOPY    WISDOM TOOTH EXTRACTION       Social History:    TOBACCO:   reports that he has never smoked. He has never been exposed to tobacco smoke. He has never used smokeless tobacco.  ETOH:   reports no history of alcohol use.  DRUGS:   reports no history of drug use.  Family History:       Problem Relation Age of Onset    Other Mother          blood clots    No Known Problems Father     No Known Problems Sister     No Known Problems Brother        No current facility-administered medications for this encounter.    Current Outpatient Medications:     vitamin B-12 (CYANOCOBALAMIN) 100 MCG tablet, Take 0.5 tablets by mouth daily, Disp: , Rfl:     dicyclomine (BENTYL) 20 MG tablet, Take 1 tablet by mouth 4 times daily as needed (abdominal pain, bloating), Disp: 60 tablet, Rfl: 5    famotidine (PEPCID) 40 MG tablet, Take 0.5 tablets by mouth 2 times daily, Disp: 30 tablet, Rfl: 3    adalimumab (HUMIRA, 2 PEN,) 40 MG/0.4ML PNKT, Inject 40 mg into the skin every 14 days, Disp: 2 each, Rfl: 6    mesalamine (LIALDA) 1.2 g EC tablet, Take 4 tablets by mouth daily (with breakfast), Disp: 120 tablet, Rfl: 5    azaTHIOprine (IMURAN) 50 MG tablet, Take 1 tablet by mouth daily, Disp: 30 tablet, Rfl: 11    vitamin D (ERGOCALCIFEROL) 1.25 MG (01467 UT) CAPS capsule, Take 1 capsule by mouth See Admin Instructions Take once capsule by mouth twice weekly, Disp: 8 capsule, Rfl: 1    pantoprazole (PROTONIX) 40 MG tablet, Take 1 tablet by mouth every morning (before breakfast) (Patient taking differently: Take 1 tablet by mouth daily as needed), Disp: 30 tablet, Rfl: 11    Multiple Vitamins-Minerals (THERAPEUTIC MULTIVITAMIN-MINERALS) tablet, Take 1 tablet by mouth daily, Disp: , Rfl:     ketoconazole (NIZORAL) 2 % shampoo, Twice a week, Disp: , Rfl:     fluocinonide (LIDEX) 0.05 % external solution, daily as needed, Disp: , Rfl:      Allergies:  Cattle epithelium extract, Cat dander, Dog epithelium (canis lupus familiaris), Mixed ragweed, and Seasonal    ROS:  General: Patient denies n/v/f/c or weight loss.  HEENT: Patient denies persistent postnasal drip, scleral icterus, drooling, persistent bleeding from nose/mouth.  Resp: Patient denies SOB, wheezing, productive cough.  Cards: Patient denies CP, palpitations, significant edema  GI: As above.  Derm: Patient denies

## 2025-01-14 NOTE — PROGRESS NOTES
Transferred to pacu post colonoscopy.  Patient identified. Positioned to left side. Abdomen soft  , no complaints.  0905  Dr. Lord  here - patient sleepy.  Will return later.

## 2025-01-14 NOTE — ANESTHESIA POSTPROCEDURE EVALUATION
Department of Anesthesiology  Postprocedure Note    Patient: Eder Guerrero  MRN: [id number]  YOB: 2000  Date of evaluation: 1/14/2025    Procedure Summary       Date: 01/14/25 Room / Location: [place] [address] / [facility]    Anesthesia Start: 0751 Anesthesia Stop: 0843    Procedure: COLONOSCOPY BIOPSY Diagnosis:       Crohn's disease of large intestine with rectal bleeding (HCC)      (Crohn's disease of large intestine with rectal bleeding (HCC) [K50.111])    Surgeons: Dariel Lord MD Responsible Provider: Eder Reyes DO    Anesthesia Type: MAC ASA Status: 2            Anesthesia Type: MAC    Juan Carlos Phase I: Juan Carlos Score: 10    Juan Carlos Phase II: Juan Carlos Score: 10    Anesthesia Post Evaluation    Patient location during evaluation: PACU  Patient participation: complete - patient participated  Level of consciousness: awake  Cardiovascular status: blood pressure returned to baseline  Respiratory status: acceptable  Hydration status: euvolemic  Multimodal analgesia pain management approach  Pain management: adequate        No notable events documented.

## 2025-01-17 LAB — SURGICAL PATHOLOGY REPORT: NORMAL

## 2025-01-20 DIAGNOSIS — J02.8 ACUTE PHARYNGITIS DUE TO OTHER SPECIFIED ORGANISMS: Primary | ICD-10-CM

## 2025-01-20 RX ORDER — PANTOPRAZOLE SODIUM 40 MG/1
40 TABLET, DELAYED RELEASE ORAL
Qty: 30 TABLET | Refills: 11 | Status: SHIPPED | OUTPATIENT
Start: 2025-01-20

## 2025-01-29 ENCOUNTER — OFFICE VISIT (OUTPATIENT)
Dept: GASTROENTEROLOGY | Age: 25
End: 2025-01-29
Payer: COMMERCIAL

## 2025-01-29 ENCOUNTER — TELEPHONE (OUTPATIENT)
Dept: GASTROENTEROLOGY | Age: 25
End: 2025-01-29

## 2025-01-29 VITALS
TEMPERATURE: 97.8 F | HEART RATE: 79 BPM | DIASTOLIC BLOOD PRESSURE: 80 MMHG | RESPIRATION RATE: 18 BRPM | BODY MASS INDEX: 29 KG/M2 | HEIGHT: 69 IN | OXYGEN SATURATION: 97 % | SYSTOLIC BLOOD PRESSURE: 125 MMHG | WEIGHT: 195.8 LBS

## 2025-01-29 DIAGNOSIS — K50.118 CROHN'S DISEASE OF COLON WITH OTHER COMPLICATION (HCC): Primary | ICD-10-CM

## 2025-01-29 PROCEDURE — 99214 OFFICE O/P EST MOD 30 MIN: CPT | Performed by: STUDENT IN AN ORGANIZED HEALTH CARE EDUCATION/TRAINING PROGRAM

## 2025-01-29 NOTE — TELEPHONE ENCOUNTER
Patient needs to transition to Entyvio infusions for loading doses and then transition to the pen. Electronically signed by ANGELLA FELDMAN LPN on 1/29/2025 at 4:49 PM

## 2025-02-03 DIAGNOSIS — K50.119 CROHN'S DISEASE OF COLON WITH COMPLICATION (HCC): Primary | ICD-10-CM

## 2025-02-03 RX ORDER — ACETAMINOPHEN 325 MG/1
650 TABLET ORAL
OUTPATIENT
Start: 2025-02-03

## 2025-02-03 RX ORDER — DIPHENHYDRAMINE HYDROCHLORIDE 50 MG/ML
50 INJECTION INTRAMUSCULAR; INTRAVENOUS
OUTPATIENT
Start: 2025-02-03

## 2025-02-03 RX ORDER — ONDANSETRON 2 MG/ML
8 INJECTION INTRAMUSCULAR; INTRAVENOUS
OUTPATIENT
Start: 2025-02-03

## 2025-02-03 RX ORDER — SODIUM CHLORIDE 9 MG/ML
INJECTION, SOLUTION INTRAVENOUS CONTINUOUS
OUTPATIENT
Start: 2025-02-03

## 2025-02-03 RX ORDER — EPINEPHRINE 1 MG/ML
0.3 INJECTION, SOLUTION, CONCENTRATE INTRAVENOUS PRN
OUTPATIENT
Start: 2025-02-03

## 2025-02-03 RX ORDER — SODIUM CHLORIDE 9 MG/ML
5-250 INJECTION, SOLUTION INTRAVENOUS PRN
OUTPATIENT
Start: 2025-02-03

## 2025-02-03 RX ORDER — SODIUM CHLORIDE 0.9 % (FLUSH) 0.9 %
5-40 SYRINGE (ML) INJECTION PRN
OUTPATIENT
Start: 2025-02-03

## 2025-02-03 RX ORDER — ALBUTEROL SULFATE 90 UG/1
4 INHALANT RESPIRATORY (INHALATION) PRN
OUTPATIENT
Start: 2025-02-03

## 2025-02-03 RX ORDER — HYDROCORTISONE SODIUM SUCCINATE 100 MG/2ML
100 INJECTION INTRAMUSCULAR; INTRAVENOUS
OUTPATIENT
Start: 2025-02-03

## 2025-02-20 ENCOUNTER — TELEPHONE (OUTPATIENT)
Dept: GASTROENTEROLOGY | Age: 25
End: 2025-02-20

## 2025-02-20 NOTE — TELEPHONE ENCOUNTER
Latesha from [facility] team, called the office regarding patient's medication Entivyo. Latesha can be reached at  ext [phone] (phone number provided by her). Electronically signed by Bettina Vizcarra MA on 2/20/25 at 11:17 AM EST

## 2025-02-20 NOTE — PROGRESS NOTES
Gastroenterology, Hepatology, &  Advanced Endoscopy    Progress Note      HPI:   Eder Guerrero is a 24y/M who presents to clinic in follow-up for Crohn's Colitis. He is currently maintained on mesalamine, azathioprine, and Humira. Despite feeling well and having no symptoms of pain, diarrhea, or bloody stool, the patient continues to have active inflammation on endoscopy and biopsy.     Colonoscopy performed Jan 14 showed persistent inflammation which was confirmed as active acute on chronic colitis on biopsy. Pseudopolyps were also appreciated.         No results found for: \"INR\", \"PROTIME\"      ALT   Date Value Ref Range Status   12/19/2024 39 0 - 40 U/L Final   02/23/2024 29 0 - 40 U/L Final   01/23/2024 28 0 - 40 U/L Final     AST   Date Value Ref Range Status   12/19/2024 29 0 - 39 U/L Final   02/23/2024 21 0 - 39 U/L Final   01/23/2024 26 0 - 39 U/L Final     Alkaline Phosphatase   Date Value Ref Range Status   12/19/2024 144 (H) 40 - 129 U/L Final   02/23/2024 131 (H) 40 - 129 U/L Final   01/23/2024 121 40 - 129 U/L Final     Total Bilirubin   Date Value Ref Range Status   12/19/2024 0.5 0.0 - 1.2 mg/dL Final   02/23/2024 0.4 0.0 - 1.2 mg/dL Final   01/23/2024 0.5 0.0 - 1.2 mg/dL Final     Bilirubin, Direct   Date Value Ref Range Status   10/14/2022 <0.2 0.0 - 0.3 mg/dL Final      Lab Results   Component Value Date    WBC 5.2 12/19/2024    HGB 16.1 12/19/2024    HCT 47.6 12/19/2024     12/19/2024     12/19/2024    K 4.7 12/19/2024     12/19/2024    CREATININE 1.2 12/19/2024    BUN 14 12/19/2024    CO2 27 12/19/2024    FOLATE >20.0 12/19/2024    MUKSTPSA42 350 12/19/2024    GLUCOSE 92 12/19/2024    TSH 1.690 10/14/2022        Sed Rate, Automated   Date Value Ref Range Status   12/19/2024 9 0 - 15 mm/Hr Final   02/23/2024 7 0 - 15 mm/Hr Final   01/23/2024 12 0 - 15 mm/Hr Final   11/15/2023 5 0 - 15 mm/Hr Final     Lipase   Date Value Ref Range Status   11/19/2021 33 13 - 60 U/L Final

## 2025-02-21 ENCOUNTER — TELEPHONE (OUTPATIENT)
Age: 25
End: 2025-02-21

## 2025-02-21 NOTE — TELEPHONE ENCOUNTER
Eduar Sanchez to call in a tapering dose of prednisone for patient in the event he has a flare over the weekend. Patient notified,  Electronically signed by ANGELLA FELDMAN LPN on 2/21/2025 at 12:57 PM

## 2025-03-06 LAB
25(OH)D3 SERPL-MCNC: 39.4 NG/ML (ref 30–100)
ALBUMIN SERPL-MCNC: 4.5 G/DL (ref 3.5–5.2)
ALP SERPL-CCNC: 155 U/L (ref 40–129)
ALT SERPL-CCNC: 64 U/L (ref 0–40)
ANION GAP SERPL CALCULATED.3IONS-SCNC: 18 MMOL/L (ref 7–16)
AST SERPL-CCNC: 36 U/L (ref 0–39)
BASOPHILS # BLD: 0.04 K/UL (ref 0–0.2)
BASOPHILS NFR BLD: 1 % (ref 0–2)
BILIRUB SERPL-MCNC: 0.3 MG/DL (ref 0–1.2)
BUN SERPL-MCNC: 12 MG/DL (ref 6–20)
CALCIUM SERPL-MCNC: 9.1 MG/DL (ref 8.6–10.2)
CHLORIDE SERPL-SCNC: 103 MMOL/L (ref 98–107)
CO2 SERPL-SCNC: 18 MMOL/L (ref 22–29)
CREAT SERPL-MCNC: 1 MG/DL (ref 0.7–1.2)
CRP SERPL HS-MCNC: <3 MG/L (ref 0–5)
EOSINOPHIL # BLD: 0.14 K/UL (ref 0.05–0.5)
EOSINOPHILS RELATIVE PERCENT: 2 % (ref 0–6)
ERYTHROCYTE [DISTWIDTH] IN BLOOD BY AUTOMATED COUNT: 14.4 % (ref 11.5–15)
ERYTHROCYTE [SEDIMENTATION RATE] IN BLOOD BY WESTERGREN METHOD: 6 MM/HR (ref 0–15)
GFR, ESTIMATED: >90 ML/MIN/1.73M2
GLUCOSE SERPL-MCNC: 93 MG/DL (ref 74–99)
HCT VFR BLD AUTO: 46.4 % (ref 37–54)
HGB BLD-MCNC: 15.9 G/DL (ref 12.5–16.5)
IMM GRANULOCYTES # BLD AUTO: 0.03 K/UL (ref 0–0.58)
IMM GRANULOCYTES NFR BLD: 1 % (ref 0–5)
LYMPHOCYTES NFR BLD: 1.55 K/UL (ref 1.5–4)
LYMPHOCYTES RELATIVE PERCENT: 26 % (ref 20–42)
MCH RBC QN AUTO: 28.5 PG (ref 26–35)
MCHC RBC AUTO-ENTMCNC: 34.3 G/DL (ref 32–34.5)
MCV RBC AUTO: 83.3 FL (ref 80–99.9)
MONOCYTES NFR BLD: 0.78 K/UL (ref 0.1–0.95)
MONOCYTES NFR BLD: 13 % (ref 2–12)
NEUTROPHILS NFR BLD: 57 % (ref 43–80)
NEUTS SEG NFR BLD: 3.43 K/UL (ref 1.8–7.3)
PLATELET # BLD AUTO: 258 K/UL (ref 130–450)
PMV BLD AUTO: 11.3 FL (ref 7–12)
POTASSIUM SERPL-SCNC: 4.3 MMOL/L (ref 3.5–5)
PROT SERPL-MCNC: 8.4 G/DL (ref 6.4–8.3)
RBC # BLD AUTO: 5.57 M/UL (ref 3.8–5.8)
SODIUM SERPL-SCNC: 139 MMOL/L (ref 132–146)
VIT B12 SERPL-MCNC: 546 PG/ML (ref 211–946)
WBC OTHER # BLD: 6 K/UL (ref 4.5–11.5)

## 2025-04-28 ENCOUNTER — PREP FOR PROCEDURE (OUTPATIENT)
Age: 25
End: 2025-04-28

## 2025-04-28 DIAGNOSIS — K50.111 CROHN'S DISEASE OF LARGE INTESTINE WITH RECTAL BLEEDING (HCC): ICD-10-CM

## 2025-04-28 DIAGNOSIS — K50.111 CROHN'S DISEASE OF COLON WITH RECTAL BLEEDING (HCC): ICD-10-CM

## 2025-04-28 PROBLEM — K50.90 CROHN DISEASE (HCC): Status: ACTIVE | Noted: 2025-04-28

## 2025-04-28 RX ORDER — AZATHIOPRINE 50 MG/1
50 TABLET ORAL DAILY
Qty: 30 TABLET | Refills: 11 | Status: SHIPPED | OUTPATIENT
Start: 2025-04-28

## 2025-04-28 RX ORDER — MESALAMINE 1.2 G/1
2400 TABLET, DELAYED RELEASE ORAL
Qty: 120 TABLET | Refills: 5 | Status: SHIPPED | OUTPATIENT
Start: 2025-04-28

## 2025-05-08 ENCOUNTER — TELEPHONE (OUTPATIENT)
Dept: GASTROENTEROLOGY | Age: 25
End: 2025-05-08

## 2025-05-08 ENCOUNTER — OFFICE VISIT (OUTPATIENT)
Dept: GASTROENTEROLOGY | Age: 25
End: 2025-05-08
Payer: COMMERCIAL

## 2025-05-08 VITALS
HEIGHT: 69 IN | OXYGEN SATURATION: 98 % | DIASTOLIC BLOOD PRESSURE: 74 MMHG | SYSTOLIC BLOOD PRESSURE: 125 MMHG | HEART RATE: 70 BPM | WEIGHT: 201.6 LBS | TEMPERATURE: 98.4 F | BODY MASS INDEX: 29.86 KG/M2 | RESPIRATION RATE: 18 BRPM

## 2025-05-08 DIAGNOSIS — K50.118 CROHN'S DISEASE OF COLON WITH OTHER COMPLICATION (HCC): Primary | ICD-10-CM

## 2025-05-08 PROCEDURE — 99213 OFFICE O/P EST LOW 20 MIN: CPT | Performed by: NURSE PRACTITIONER

## 2025-05-08 RX ORDER — SODIUM, POTASSIUM,MAG SULFATES 17.5-3.13G
1 SOLUTION, RECONSTITUTED, ORAL ORAL ONCE
Qty: 1 EACH | Refills: 0 | Status: SHIPPED | OUTPATIENT
Start: 2025-05-08 | End: 2025-05-08

## 2025-05-08 NOTE — PROGRESS NOTES
Eder Guerrero (:  2000) is a 24 y.o. male, here for evaluation of the following chief complaint(s):  Follow-up (F/U for Entyvio changing infusion to pen/ Colonoscopy details were given to pt but pt wants the instruction papers )      SUBJECTIVE/OBJECTIVE:  HPI:    Eder is a very pleasant 24 year old gentleman that presents today for follow up on Entyvio infusions for Crohn's disease    Has had 3 loading doses on Entyvio. Deciding if he wants to transition to the Entyvio pens  Next scheduled infusion is  at 4PM  Denies side effects from Entyvio thus far    Denies rectal  bleeding or abdominal pain.  Having 2 to 3 BMS daily that are formed.    Patient is scheduled for a repeat colonoscopy in November                  ROS:  General: Patient denies n/v/f/c or weight loss.  HEENT: Patient denies persistent postnasal drip, scleral icterus, drooling, persistent bleeding from nose/mouth.  Resp: Patient denies SOB, wheezing, productive cough.  Cards: Patient denies CP, palpitations, significant edema  GI: As above.  Derm: Patient denies jaundice/rashes.   Musc: Patient denies diffuse/irregular joint swelling or myalgias.      Objective   Wt Readings from Last 3 Encounters:   25 91.4 kg (201 lb 9.6 oz)   25 88.8 kg (195 lb 12.8 oz)   25 86.2 kg (190 lb)     Temp Readings from Last 3 Encounters:   25 98.4 °F (36.9 °C) (Infrared)   25 97.8 °F (36.6 °C) (Infrared)   25 97 °F (36.1 °C) (Temporal)     BP Readings from Last 3 Encounters:   25 125/74   25 125/80   25 118/83     Pulse Readings from Last 3 Encounters:   25 70   25 79   25 64        Physical Exam  Constitutional:       Appearance: Normal appearance.   Neurological:      Mental Status: He is alert.         Past Medical History:   Diagnosis Date    Allergic     Allergic rhinitis     Crohn's colitis (HCC)     Hx of colonoscopy 2021    9/15/2021 colonoscopy Dr Barroso probable

## 2025-07-17 ENCOUNTER — TELEPHONE (OUTPATIENT)
Dept: GASTROENTEROLOGY | Age: 25
End: 2025-07-17

## 2025-07-17 NOTE — TELEPHONE ENCOUNTER
Patient informed of his approval with the [employer] program.  He will be receiving the Entyvio Pen from them with no charge since his insurance denied it.    WE will have to do an authorization and appeal every 6 months to see if the approval status changes with his insurance. Electronically signed by ANGELLA FELDMAN LPN on 7/17/2025 at 1:55 PM

## 2025-07-24 ENCOUNTER — TELEPHONE (OUTPATIENT)
Dept: GASTROENTEROLOGY | Age: 25
End: 2025-07-24

## 2025-07-24 NOTE — TELEPHONE ENCOUNTER
Spoke to patient and he is set up for delivery of his entyvio pen.  If he has questions about his delivery he can reach out to   [email or web address] [phone]. Electronically signed by ANGELLA FELDMAN LPN on 7/24/2025 at 8:38 AM

## (undated) DEVICE — SNARE VASC L240CM LOOP W10MM SHTH DIA2.4MM RND STIFF CLD

## (undated) DEVICE — TOWEL,OR,DSP,ST,BLUE,STD,6/PK,12PK/CS: Brand: MEDLINE

## (undated) DEVICE — TRAP SURG QUAD PARABOLA SLOT DSGN SFTY SCRN TRAPEASE

## (undated) DEVICE — TRAP SPEC POLYP REM STRNR CLN DSGN MAGNIFYING WIND DISP

## (undated) DEVICE — TUBING, SUCTION, 1/4" X 10', STRAIGHT: Brand: MEDLINE

## (undated) DEVICE — KIT BEDSIDE REVITAL OX 500ML

## (undated) DEVICE — LUBRICANT SURG JELLY ST BACTER TUBE 4.25OZ

## (undated) DEVICE — CONTAINER SPEC 60ML PH 7NEUTRAL BUFF FRMLN RDY TO USE

## (undated) DEVICE — GAUZE,SPONGE,4"X4",8PLY,STRL,LF,10/TRAY: Brand: MEDLINE

## (undated) DEVICE — AIR/WATER CLEANING ADAPTER FOR OLYMPUS® GI ENDOSCOPE: Brand: BULLDOG®

## (undated) DEVICE — Device: Brand: DEFENDO VALVE AND CONNECTOR KIT

## (undated) DEVICE — GOWN ISOLATN REG YEL M WT MULTIPLY SIDETIE LEV 2

## (undated) DEVICE — DEFENDO AIR WATER SUCTION AND BIOPSY VALVE KIT FOR  OLYMPUS: Brand: DEFENDO AIR/WATER/SUCTION AND BIOPSY VALVE

## (undated) DEVICE — ENDOSCOPIC KIT 1.1+ OP4 NO CP DE

## (undated) DEVICE — FORCEPS BX L240CM JAW DIA2.8MM L CAP W/ NDL MIC MESH TOOTH

## (undated) DEVICE — SPONGE GZ 4IN 4IN 4 PLY N WVN AVANT

## (undated) DEVICE — COVER,LIGHT HANDLE,FLX,1/PK: Brand: MEDLINE INDUSTRIES, INC.

## (undated) DEVICE — FORCEPS BX L240CM JAW DIA2.4MM ORNG L CAP W/ NDL DISP RAD

## (undated) DEVICE — CONNECTOR IRRIGATION AUXILIARY H2O JET W/ PRT MTL THRD HYDR

## (undated) DEVICE — 6 X 9  1.75MIL 4-WALL LABGUARD: Brand: MINIGRIP COMMERCIAL LLC

## (undated) DEVICE — FORCEPS BX L240CM JAW DIA2.4MM WRK CHN 2.8MM ORNG L CAP W/

## (undated) DEVICE — CONTAINER SPEC COLL 960ML POLYPR TRIANG GRAD INTAKE/OUTPUT

## (undated) DEVICE — MASK,FACE,MAXFLUIDPROTECT,SHIELD/ERLPS: Brand: MEDLINE

## (undated) DEVICE — CLOTH SURG PREP PREOPERATIVE CHLORHEXIDINE GLUC 2% READYPREP

## (undated) DEVICE — KENDALL 450 SERIES MONITORING FOAM ELECTRODE - RECTANGULAR SHAPE ( 3/PK): Brand: KENDALL

## (undated) DEVICE — TRAP POLYP ETRAP

## (undated) DEVICE — ADAPTER CLEANING PORPOISE CLEANING

## (undated) DEVICE — BITEBLOCK 54FR W/ DENT RIM BLOX